# Patient Record
Sex: MALE | Race: WHITE | NOT HISPANIC OR LATINO | Employment: OTHER | URBAN - METROPOLITAN AREA
[De-identification: names, ages, dates, MRNs, and addresses within clinical notes are randomized per-mention and may not be internally consistent; named-entity substitution may affect disease eponyms.]

---

## 2021-05-13 ENCOUNTER — APPOINTMENT (EMERGENCY)
Dept: RADIOLOGY | Facility: HOSPITAL | Age: 66
End: 2021-05-13
Payer: MEDICARE

## 2021-05-13 ENCOUNTER — APPOINTMENT (EMERGENCY)
Dept: CT IMAGING | Facility: HOSPITAL | Age: 66
End: 2021-05-13
Payer: MEDICARE

## 2021-05-13 ENCOUNTER — HOSPITAL ENCOUNTER (OUTPATIENT)
Facility: HOSPITAL | Age: 66
Setting detail: OBSERVATION
Discharge: HOME | End: 2021-05-14
Attending: EMERGENCY MEDICINE | Admitting: INTERNAL MEDICINE
Payer: MEDICARE

## 2021-05-13 DIAGNOSIS — R07.9 CHEST PAIN: ICD-10-CM

## 2021-05-13 DIAGNOSIS — R42 VERTIGO: Primary | ICD-10-CM

## 2021-05-13 PROBLEM — K21.9 GASTROESOPHAGEAL REFLUX DISEASE WITHOUT ESOPHAGITIS: Chronic | Status: ACTIVE | Noted: 2021-05-13

## 2021-05-13 PROBLEM — J45.30 MILD PERSISTENT ASTHMA WITHOUT COMPLICATION: Chronic | Status: ACTIVE | Noted: 2021-05-13

## 2021-05-13 PROBLEM — G25.81 RESTLESS LEGS: Chronic | Status: ACTIVE | Noted: 2021-05-13

## 2021-05-13 PROBLEM — R93.0 ABNORMAL CT SCAN OF HEAD: Status: ACTIVE | Noted: 2021-05-13

## 2021-05-13 PROBLEM — E78.49 OTHER HYPERLIPIDEMIA: Chronic | Status: ACTIVE | Noted: 2021-05-13

## 2021-05-13 LAB
ALBUMIN SERPL BCP-MCNC: 4 G/DL (ref 3.5–5)
ALP SERPL-CCNC: 64 U/L (ref 46–116)
ALT SERPL W P-5'-P-CCNC: 32 U/L (ref 12–78)
ANION GAP SERPL CALCULATED.3IONS-SCNC: 7 MMOL/L (ref 4–13)
AST SERPL W P-5'-P-CCNC: 16 U/L (ref 5–45)
ATRIAL RATE: 65 BPM
BASOPHILS # BLD AUTO: 0.05 THOUSANDS/ΜL (ref 0–0.1)
BASOPHILS NFR BLD AUTO: 1 % (ref 0–1)
BILIRUB DIRECT SERPL-MCNC: 0.08 MG/DL (ref 0–0.2)
BILIRUB SERPL-MCNC: 0.25 MG/DL (ref 0.2–1)
BUN SERPL-MCNC: 15 MG/DL (ref 5–25)
CALCIUM SERPL-MCNC: 9 MG/DL (ref 8.3–10.1)
CHLORIDE SERPL-SCNC: 105 MMOL/L (ref 100–108)
CO2 SERPL-SCNC: 28 MMOL/L (ref 21–32)
CREAT SERPL-MCNC: 1.11 MG/DL (ref 0.6–1.3)
EOSINOPHIL # BLD AUTO: 0.1 THOUSAND/ΜL (ref 0–0.61)
EOSINOPHIL NFR BLD AUTO: 1 % (ref 0–6)
ERYTHROCYTE [DISTWIDTH] IN BLOOD BY AUTOMATED COUNT: 12.1 % (ref 11.6–15.1)
GFR SERPL CREATININE-BSD FRML MDRD: 68 ML/MIN/1.73SQ M
GLUCOSE SERPL-MCNC: 86 MG/DL (ref 65–140)
HCT VFR BLD AUTO: 44.3 % (ref 36.5–49.3)
HGB BLD-MCNC: 14.8 G/DL (ref 12–17)
IMM GRANULOCYTES # BLD AUTO: 0.03 THOUSAND/UL (ref 0–0.2)
IMM GRANULOCYTES NFR BLD AUTO: 0 % (ref 0–2)
LYMPHOCYTES # BLD AUTO: 2.21 THOUSANDS/ΜL (ref 0.6–4.47)
LYMPHOCYTES NFR BLD AUTO: 29 % (ref 14–44)
MAGNESIUM SERPL-MCNC: 2.2 MG/DL (ref 1.6–2.6)
MCH RBC QN AUTO: 31.7 PG (ref 26.8–34.3)
MCHC RBC AUTO-ENTMCNC: 33.4 G/DL (ref 31.4–37.4)
MCV RBC AUTO: 95 FL (ref 82–98)
MONOCYTES # BLD AUTO: 0.77 THOUSAND/ΜL (ref 0.17–1.22)
MONOCYTES NFR BLD AUTO: 10 % (ref 4–12)
NEUTROPHILS # BLD AUTO: 4.42 THOUSANDS/ΜL (ref 1.85–7.62)
NEUTS SEG NFR BLD AUTO: 59 % (ref 43–75)
NRBC BLD AUTO-RTO: 0 /100 WBCS
P AXIS: 52 DEGREES
PLATELET # BLD AUTO: 286 THOUSANDS/UL (ref 149–390)
PMV BLD AUTO: 9.8 FL (ref 8.9–12.7)
POTASSIUM SERPL-SCNC: 4.4 MMOL/L (ref 3.5–5.3)
PR INTERVAL: 170 MS
PROT SERPL-MCNC: 8.3 G/DL (ref 6.4–8.2)
QRS AXIS: -35 DEGREES
QRSD INTERVAL: 104 MS
QT INTERVAL: 394 MS
QTC INTERVAL: 409 MS
RBC # BLD AUTO: 4.67 MILLION/UL (ref 3.88–5.62)
SODIUM SERPL-SCNC: 140 MMOL/L (ref 136–145)
T WAVE AXIS: 36 DEGREES
TROPONIN I SERPL-MCNC: <0.02 NG/ML
TSH SERPL DL<=0.05 MIU/L-ACNC: 2.98 UIU/ML (ref 0.36–3.74)
VENTRICULAR RATE: 65 BPM
WBC # BLD AUTO: 7.58 THOUSAND/UL (ref 4.31–10.16)

## 2021-05-13 PROCEDURE — G1004 CDSM NDSC: HCPCS

## 2021-05-13 PROCEDURE — 85025 COMPLETE CBC W/AUTO DIFF WBC: CPT | Performed by: EMERGENCY MEDICINE

## 2021-05-13 PROCEDURE — 80048 BASIC METABOLIC PNL TOTAL CA: CPT | Performed by: EMERGENCY MEDICINE

## 2021-05-13 PROCEDURE — 84484 ASSAY OF TROPONIN QUANT: CPT | Performed by: EMERGENCY MEDICINE

## 2021-05-13 PROCEDURE — 99285 EMERGENCY DEPT VISIT HI MDM: CPT | Performed by: EMERGENCY MEDICINE

## 2021-05-13 PROCEDURE — 70498 CT ANGIOGRAPHY NECK: CPT

## 2021-05-13 PROCEDURE — 83735 ASSAY OF MAGNESIUM: CPT | Performed by: EMERGENCY MEDICINE

## 2021-05-13 PROCEDURE — 93010 ELECTROCARDIOGRAM REPORT: CPT | Performed by: INTERNAL MEDICINE

## 2021-05-13 PROCEDURE — 1124F ACP DISCUSS-NO DSCNMKR DOCD: CPT | Performed by: INTERNAL MEDICINE

## 2021-05-13 PROCEDURE — 99285 EMERGENCY DEPT VISIT HI MDM: CPT

## 2021-05-13 PROCEDURE — 93005 ELECTROCARDIOGRAM TRACING: CPT

## 2021-05-13 PROCEDURE — 70496 CT ANGIOGRAPHY HEAD: CPT

## 2021-05-13 PROCEDURE — 71046 X-RAY EXAM CHEST 2 VIEWS: CPT

## 2021-05-13 PROCEDURE — 99220 PR INITIAL OBSERVATION CARE/DAY 70 MINUTES: CPT | Performed by: PHYSICIAN ASSISTANT

## 2021-05-13 PROCEDURE — 84443 ASSAY THYROID STIM HORMONE: CPT | Performed by: EMERGENCY MEDICINE

## 2021-05-13 PROCEDURE — 36415 COLL VENOUS BLD VENIPUNCTURE: CPT | Performed by: EMERGENCY MEDICINE

## 2021-05-13 PROCEDURE — 80076 HEPATIC FUNCTION PANEL: CPT | Performed by: EMERGENCY MEDICINE

## 2021-05-13 RX ORDER — BUDESONIDE AND FORMOTEROL FUMARATE DIHYDRATE 160; 4.5 UG/1; UG/1
2 AEROSOL RESPIRATORY (INHALATION) 2 TIMES DAILY
COMMUNITY

## 2021-05-13 RX ORDER — FLUTICASONE FUROATE AND VILANTEROL 100; 25 UG/1; UG/1
1 POWDER RESPIRATORY (INHALATION) DAILY
Status: DISCONTINUED | OUTPATIENT
Start: 2021-05-14 | End: 2021-05-14 | Stop reason: HOSPADM

## 2021-05-13 RX ORDER — ONDANSETRON 2 MG/ML
4 INJECTION INTRAMUSCULAR; INTRAVENOUS EVERY 6 HOURS PRN
Status: DISCONTINUED | OUTPATIENT
Start: 2021-05-13 | End: 2021-05-14 | Stop reason: HOSPADM

## 2021-05-13 RX ORDER — DIPHENHYDRAMINE HCL 25 MG
25 TABLET ORAL EVERY 6 HOURS PRN
Status: DISCONTINUED | OUTPATIENT
Start: 2021-05-13 | End: 2021-05-14 | Stop reason: HOSPADM

## 2021-05-13 RX ORDER — OXYCODONE HYDROCHLORIDE 5 MG/1
5 CAPSULE ORAL 2 TIMES DAILY
COMMUNITY

## 2021-05-13 RX ORDER — PRAMIPEXOLE DIHYDROCHLORIDE 0.25 MG/1
0.25 TABLET ORAL 3 TIMES DAILY
Status: DISCONTINUED | OUTPATIENT
Start: 2021-05-13 | End: 2021-05-13

## 2021-05-13 RX ORDER — DIPHENHYDRAMINE HCL 25 MG
25 CAPSULE ORAL
COMMUNITY

## 2021-05-13 RX ORDER — ALBUTEROL SULFATE 90 UG/1
1 AEROSOL, METERED RESPIRATORY (INHALATION) EVERY 4 HOURS PRN
Status: DISCONTINUED | OUTPATIENT
Start: 2021-05-13 | End: 2021-05-14 | Stop reason: HOSPADM

## 2021-05-13 RX ORDER — MECLIZINE HYDROCHLORIDE 25 MG/1
25 TABLET ORAL 3 TIMES DAILY PRN
Qty: 30 TABLET | Refills: 0 | Status: SHIPPED | OUTPATIENT
Start: 2021-05-13

## 2021-05-13 RX ORDER — POLYETHYLENE GLYCOL 3350 17 G/17G
17 POWDER, FOR SOLUTION ORAL DAILY PRN
Status: DISCONTINUED | OUTPATIENT
Start: 2021-05-13 | End: 2021-05-14 | Stop reason: HOSPADM

## 2021-05-13 RX ORDER — MECLIZINE HCL 12.5 MG/1
25 TABLET ORAL ONCE
Status: COMPLETED | OUTPATIENT
Start: 2021-05-13 | End: 2021-05-13

## 2021-05-13 RX ORDER — PRAMIPEXOLE DIHYDROCHLORIDE 0.12 MG/1
0.25 TABLET ORAL
COMMUNITY
Start: 2021-05-11

## 2021-05-13 RX ORDER — PANTOPRAZOLE SODIUM 40 MG/1
40 TABLET, DELAYED RELEASE ORAL 2 TIMES DAILY
COMMUNITY

## 2021-05-13 RX ORDER — PANTOPRAZOLE SODIUM 40 MG/1
40 TABLET, DELAYED RELEASE ORAL
Status: DISCONTINUED | OUTPATIENT
Start: 2021-05-13 | End: 2021-05-14 | Stop reason: HOSPADM

## 2021-05-13 RX ORDER — OXYCODONE HYDROCHLORIDE 5 MG/1
5 TABLET ORAL 2 TIMES DAILY
Status: DISCONTINUED | OUTPATIENT
Start: 2021-05-13 | End: 2021-05-14 | Stop reason: HOSPADM

## 2021-05-13 RX ORDER — OXYCODONE HYDROCHLORIDE 5 MG/1
5 TABLET ORAL ONCE
Status: COMPLETED | OUTPATIENT
Start: 2021-05-13 | End: 2021-05-13

## 2021-05-13 RX ORDER — ACETAMINOPHEN 325 MG/1
650 TABLET ORAL EVERY 6 HOURS PRN
Status: DISCONTINUED | OUTPATIENT
Start: 2021-05-13 | End: 2021-05-14 | Stop reason: HOSPADM

## 2021-05-13 RX ORDER — PHENOL 1.4 %
1 AEROSOL, SPRAY (ML) MUCOUS MEMBRANE
COMMUNITY

## 2021-05-13 RX ORDER — LANOLIN ALCOHOL/MO/W.PET/CERES
6 CREAM (GRAM) TOPICAL
Status: DISCONTINUED | OUTPATIENT
Start: 2021-05-13 | End: 2021-05-14 | Stop reason: HOSPADM

## 2021-05-13 RX ORDER — PRAMIPEXOLE DIHYDROCHLORIDE 0.25 MG/1
0.25 TABLET ORAL
Status: DISCONTINUED | OUTPATIENT
Start: 2021-05-13 | End: 2021-05-14 | Stop reason: HOSPADM

## 2021-05-13 RX ORDER — CALCIUM CARBONATE 200(500)MG
1000 TABLET,CHEWABLE ORAL DAILY PRN
Status: DISCONTINUED | OUTPATIENT
Start: 2021-05-13 | End: 2021-05-14 | Stop reason: HOSPADM

## 2021-05-13 RX ADMIN — OXYCODONE HYDROCHLORIDE 5 MG: 5 TABLET ORAL at 16:38

## 2021-05-13 RX ADMIN — MECLIZINE 25 MG: 12.5 TABLET ORAL at 16:38

## 2021-05-13 RX ADMIN — ACETAMINOPHEN 650 MG: 325 TABLET, FILM COATED ORAL at 20:42

## 2021-05-13 RX ADMIN — IOHEXOL 85 ML: 350 INJECTION, SOLUTION INTRAVENOUS at 17:21

## 2021-05-13 RX ADMIN — DIPHENHYDRAMINE HCL 25 MG: 25 TABLET ORAL at 22:53

## 2021-05-13 RX ADMIN — PANTOPRAZOLE SODIUM 40 MG: 40 TABLET, DELAYED RELEASE ORAL at 20:27

## 2021-05-13 RX ADMIN — OXYCODONE HYDROCHLORIDE 5 MG: 5 TABLET ORAL at 20:27

## 2021-05-13 RX ADMIN — PRAMIPEXOLE DIHYDROCHLORIDE 0.25 MG: 0.25 TABLET ORAL at 22:53

## 2021-05-13 RX ADMIN — MELATONIN 6 MG: at 22:53

## 2021-05-13 NOTE — ASSESSMENT & PLAN NOTE
· Patient complaining of intermittent vertigo that began yesterday-has been on and off since  · Additionally complaining of intermittent nonexertional chest pain   · These 2 symptoms prompted him to come the emergency department for further evaluation  · Patient's dizziness resolved with a dose of meclizine in the emergency department  · CT/CTA of the head and neck was obtained and showed a small focus of high density identified along the interhemispheric fissure on the noncontrast head CT measuring approximately 5 mm    Differential includes small meningioma, dural calcification and less likely but not excluded a small volume of subdural and subarachnoid hemorrhage  · Recommending a repeat CT scan in 24 hours for further evaluation  · Will place order for CT scan tomorrow  · Emergency department discussed with Neurosurgery and recommended that he be admitted to [facility] at this time  · With no focal neurologic deficits on exam

## 2021-05-13 NOTE — ASSESSMENT & PLAN NOTE
· Complaining of intermittent, twinging chest pain is nonexertional in nature  · Reports that last evening he was having chest pressure while lying down after eating  · States this is been going on over the course of the last few months  · EKG in the emergency department showed normal sinus rhythm with a left axis deviation  · Troponin x1 negative  · TARAS 1  · Chest pain does not sound anginal in nature-patient denies any history of hypertension, CAD or family history

## 2021-05-13 NOTE — DISCHARGE INSTRUCTIONS
Return to the ER if your chest pain changes in quality or location, or becomes associated with shortness of breath, lightheadedness, vomiting or sweating  Take the meclizine 3 times daily for the next 3 days, then as needed for dizziness  The number for an Ear, Nose and Throat doctor is included in these discharge instructions, call to make an appointment for further evaluation  The number for cardiology is included in these discharge instructions, call to be seen in the office for further evaluation and management

## 2021-05-13 NOTE — ED NOTES
Chetan Whitfield RN called patient's pharmacy at this time for list of home medications       Tra Swan RN  05/13/21 0526

## 2021-05-13 NOTE — ED NOTES
ED provider at bedside       [facility] Dejesus [facility]  05/13/21 4787

## 2021-05-13 NOTE — ED PROVIDER NOTES
History  Chief Complaint   Patient presents with    Chest Pain     Pt reports intermittent sharp chest pain accross chest  Pain feels like pressure when laying down  Pt denies worsening SOB  Pt does reports vertigo througout the day yesterday  76 YO male presents for evaluation of vertigo, onset last night  Pt states symptoms started ~20 hours PTA  Pt notes he was in his kitchen when he had a sudden onset of dizziness, feeling off balance  He sat down and states this did improve mildly  Pt went to sleep and states he woke with further improvement in his symptoms  He notes feeling some dysequilibrium, he has been able to ambulate, no focal neurologic deficit, he denies similar in the past  Pt has not taken anything for his discomfort  Pt additionally notes some intermittent, fleeting chest discomfort that has been an ongoing issue  States this will often occur at rest, feels like electricity across the chest that resolves rapidly  Pt has not been evaluated for this in the past  He denies an exertional component to this  He has no associated nausea, shortness of breath, palpitations or lightheadedness  Pt has no known cardiac issues, no HTN or DM  Pt denies SOB/F/C/N/V/D/C, no dysuria, burning on urination or blood in urine         History provided by:  Patient   used: No    Chest Pain  Chest pain location: Anterior chest   Pain radiates to:  Does not radiate  Pain severity:  Mild  Onset quality:  Unable to specify  Timing:  Intermittent  Progression:  Waxing and waning  Chronicity:  Chronic  Relieved by:  Nothing  Worsened by:  Nothing tried  Ineffective treatments:  None tried  Associated symptoms: dizziness    Associated symptoms: no abdominal pain, no fever, no nausea, no shortness of breath, not vomiting and no weakness    Dizziness  Quality:  Room spinning  Severity:  Moderate  Onset quality:  Sudden  Duration:  20 hours  Timing:  Constant  Progression:  Partially resolved  Chronicity: New  Relieved by:  Nothing  Worsened by:  Nothing  Ineffective treatments:  None tried  Associated symptoms: chest pain    Associated symptoms: no nausea, no shortness of breath, no vomiting and no weakness        Prior to Admission Medications   Prescriptions Last Dose Informant Patient Reported? Taking? Albuterol Sulfate (VENTOLIN HFA IN)   Yes Yes   Sig: Inhale   Fluticasone Furoate-Vilanterol (BREO ELLIPTA IN)   Yes Yes   Sig: Inhale   Melatonin 10 MG TABS   Yes Yes   Sig: Take 1 tablet by mouth daily at bedtime   budesonide-formoterol (SYMBICORT) 160-4 5 mcg/act inhaler   Yes Yes   Sig: Inhale 2 puffs 2 (two) times a day Rinse mouth after use  diphenhydrAMINE (BENADRYL) 25 mg capsule   Yes Yes   Sig: Take 25 mg by mouth daily at bedtime as needed for sleep   oxyCODONE (OXY-IR) 5 MG capsule   Yes Yes   Sig: Take 5 mg by mouth 2 (two) times a day   pantoprazole (PROTONIX) 40 mg tablet   Yes Yes   Sig: Take 40 mg by mouth 2 (two) times a day   pramipexole (MIRAPEX) 0 125 mg tablet   Yes Yes   Sig: Take 0 25 mg by mouth daily at bedtime      Facility-Administered Medications: None       Past Medical History:   Diagnosis Date    Asthma     Degenerative disc disease, lumbar        Past Surgical History:   Procedure Laterality Date    HERNIA REPAIR      TOTAL HIP ARTHROPLASTY Bilateral        History reviewed  No pertinent family history  I have reviewed and agree with the history as documented  E-Cigarette/Vaping    E-Cigarette Use Never User      E-Cigarette/Vaping Substances     Social History     Tobacco Use    Smoking status: Former Smoker    Smokeless tobacco: Never Used    Tobacco comment: Quit 20 yrs ago   Substance Use Topics    Alcohol use: Yes     Frequency: 4 or more times a week     Drinks per session: 1 or 2     Binge frequency: Never    Drug use: Never       Review of Systems   Constitutional: Negative for fever  HENT: Negative for dental problem      Eyes: Negative for visual disturbance  Respiratory: Negative for shortness of breath  Cardiovascular: Positive for chest pain  Gastrointestinal: Negative for abdominal pain, nausea and vomiting  Genitourinary: Negative for dysuria and frequency  Musculoskeletal: Negative for neck pain and neck stiffness  Skin: Negative for rash  Neurological: Positive for dizziness  Negative for weakness and light-headedness  Psychiatric/Behavioral: Negative for agitation, behavioral problems and confusion  All other systems reviewed and are negative  Physical Exam  Physical Exam  Vitals signs and nursing note reviewed  Constitutional:       Appearance: He is well-developed  HENT:      Head: Normocephalic and atraumatic  Eyes:      Extraocular Movements: Extraocular movements intact  Pupils: Pupils are equal, round, and reactive to light  Comments: No nystagmus  Neck:      Musculoskeletal: Normal range of motion  Cardiovascular:      Rate and Rhythm: Normal rate and regular rhythm  Pulmonary:      Effort: Pulmonary effort is normal    Abdominal:      General: There is no distension  Musculoskeletal: Normal range of motion  Skin:     Findings: No rash  Neurological:      Mental Status: He is alert and oriented to person, place, and time  Coordination: Coordination is intact  Romberg sign negative  Coordination normal  Finger-Nose-Finger Test and Heel to Marsa Baba Test normal       Gait: Gait is intact     Psychiatric:         Behavior: Behavior normal          Vital Signs  ED Triage Vitals   Temperature Pulse Respirations Blood Pressure SpO2   05/13/21 1552 05/13/21 1552 05/13/21 1552 05/13/21 1552 05/13/21 1552   97 9 °F (36 6 °C) 68 18 (!) 187/89 99 %      Temp Source Heart Rate Source Patient Position - Orthostatic VS BP Location FiO2 (%)   05/13/21 1552 05/13/21 1831 05/13/21 1552 05/13/21 1552 --   Oral Monitor Lying Right arm       Pain Score       05/13/21 1552       7           Vitals:    05/13/21 1831 05/13/21 2013 05/14/21 0027 05/14/21 0700   BP: (!) 178/81 159/74 159/74 162/80   Pulse: 64 98 67 58   Patient Position - Orthostatic VS: Lying Lying Lying Lying         Visual Acuity  Visual Acuity      Most Recent Value   L Pupil Size (mm)  3   R Pupil Size (mm)  3   L Pupil Shape  Round   R Pupil Shape  Round          ED Medications  Medications   meclizine (ANTIVERT) tablet 25 mg (25 mg Oral Given 5/13/21 1638)   oxyCODONE (ROXICODONE) IR tablet 5 mg (5 mg Oral Given 5/13/21 1638)   iohexol (OMNIPAQUE) 350 MG/ML injection (SINGLE-DOSE) 85 mL (85 mL Intravenous Given 5/13/21 1721)   temazepam (RESTORIL) capsule 15 mg (15 mg Oral Given 5/14/21 0054)   metoclopramide (REGLAN) injection 10 mg (10 mg Intravenous Given 5/14/21 0054)       Diagnostic Studies  Results Reviewed     Procedure Component Value Units Date/Time    Rapid drug screen, urine [[id number]]  (Abnormal) Collected: 05/14/21 1002    Lab Status: Final result Specimen: Urine, Clean Catch Updated: 05/14/21 1158     Amph/Meth UR Negative     Barbiturate Ur Negative     Benzodiazepine Urine Negative     Cocaine Urine Negative     Methadone Urine Negative     Opiate Urine Positive     PCP Ur Negative     THC Urine Negative     Oxycodone Urine Positive    Narrative:      Presumptive report  If requested, specimen will be sent to reference lab for confirmation  FOR MEDICAL PURPOSES ONLY  IF CONFIRMATION NEEDED PLEASE CONTACT THE LAB WITHIN 5 DAYS      Drug Screen Cutoff Levels:  AMPHETAMINE/METHAMPHETAMINES  1000 ng/mL  BARBITURATES     200 ng/mL  BENZODIAZEPINES     200 ng/mL  COCAINE      300 ng/mL  METHADONE      300 ng/mL  OPIATES      300 ng/mL  PHENCYCLIDINE     25 ng/mL  THC       50 ng/mL  OXYCODONE      100 ng/mL    Hepatic function panel [[id number]]  (Abnormal) Collected: 05/13/21 1638    Lab Status: Final result Specimen: Blood from Arm, Right Updated: 05/13/21 1711     Total Bilirubin 0 25 mg/dL      Bilirubin, Direct 0 08 mg/dL Alkaline Phosphatase 64 U/L      AST 16 U/L      ALT 32 U/L      Total Protein 8 3 g/dL      Albumin 4 0 g/dL     TSH [[id number]]  (Normal) Collected: 05/13/21 1638    Lab Status: Final result Specimen: Blood from Arm, Right Updated: 05/13/21 1711     TSH 3RD GENERATON 2 985 uIU/mL     Narrative:      Patients undergoing fluorescein dye angiography may retain small amounts of fluorescein in the body for 48-72 hours post procedure  Samples containing fluorescein can produce falsely depressed TSH values  If the patient had this procedure,a specimen should be resubmitted post fluorescein clearance        Magnesium [[id number]]  (Normal) Collected: 05/13/21 1638    Lab Status: Final result Specimen: Blood from Arm, Right Updated: 05/13/21 1711     Magnesium 2 2 mg/dL     Troponin I [[id number]]  (Normal) Collected: 05/13/21 1638    Lab Status: Final result Specimen: Blood from Arm, Right Updated: 05/13/21 1703     Troponin I <0 02 ng/mL     Basic metabolic panel [[id number]] Collected: 05/13/21 1638    Lab Status: Final result Specimen: Blood from Arm, Right Updated: 05/13/21 1700     Sodium 140 mmol/L      Potassium 4 4 mmol/L      Chloride 105 mmol/L      CO2 28 mmol/L      ANION GAP 7 mmol/L      BUN 15 mg/dL      Creatinine 1 11 mg/dL      Glucose 86 mg/dL      Calcium 9 0 mg/dL      eGFR 68 ml/min/1 73sq m     Narrative:      Prabhu guidelines for Chronic Kidney Disease (CKD):     Stage 1 with normal or high GFR (GFR > 90 mL/min/1 73 square meters)    Stage 2 Mild CKD (GFR = 60-89 mL/min/1 73 square meters)    Stage 3A Moderate CKD (GFR = 45-59 mL/min/1 73 square meters)    Stage 3B Moderate CKD (GFR = 30-44 mL/min/1 73 square meters)    Stage 4 Severe CKD (GFR = 15-29 mL/min/1 73 square meters)    Stage 5 End Stage CKD (GFR <15 mL/min/1 73 square meters)  Note: GFR calculation is accurate only with a steady state creatinine    CBC and differential [[id number]] Collected: 05/13/21 1638 Lab Status: Final result Specimen: Blood from Arm, Right Updated: 05/13/21 1643     WBC 7 58 Thousand/uL      RBC 4 67 Million/uL      Hemoglobin 14 8 g/dL      Hematocrit 44 3 %      MCV 95 fL      MCH 31 7 pg      MCHC 33 4 g/dL      RDW 12 1 %      MPV 9 8 fL      Platelets 779 Thousands/uL      nRBC 0 /100 WBCs      Neutrophils Relative 59 %      Immat GRANS % 0 %      Lymphocytes Relative 29 %      Monocytes Relative 10 %      Eosinophils Relative 1 %      Basophils Relative 1 %      Neutrophils Absolute 4 42 Thousands/µL      Immature Grans Absolute 0 03 Thousand/uL      Lymphocytes Absolute 2 21 Thousands/µL      Monocytes Absolute 0 77 Thousand/µL      Eosinophils Absolute 0 10 Thousand/µL      Basophils Absolute 0 05 Thousands/µL                  CT head wo contrast   Final Result by Jerry Ross MD (05/14 1222)      No acute intracranial abnormality  No intracranial hemorrhage  Specifically, the previously noted midline interhemispheric region hyperdensity likely represents volume averaging artifact with branches of the anterior cerebral artery  Mild cerebral chronic microangiopathic changes  Workstation performed: [id number]         CTA head and neck with and without contrast   Final Result by Gus Cabral DO (05/13 6471)      Small focus of high density identified along the interhemispheric fissure series 2 image 32 on the noncontrast head CT measuring approximately 5 mm  Differential considerations include small meningioma, dural calcification, and less likely but not    excluded small volume of subdural or subarachnoid hemorrhage  A repeat CT of the brain is recommended in 24 hours for further evaluation  Small caliber change identified distal right ICA as described above  Recommend consultation with the [facility], a division of [facility] at [phone]          Atherosclerotic calcification of the carotid bifurcation results in no greater than mild stenosis  No focal intracranial stenosis identified  I personally discussed this study with Omer Ibarra on 5/13/2021 at 5:51 PM                      Workstation performed: [id number]         XR chest 2 views   Final Result by Gagandeep Zarate MD (05/13 1807)      No acute cardiopulmonary disease  Workstation performed: [id number]                    Procedures  ECG 12 Lead Documentation Only    Date/Time: 5/13/2021 5:05 PM  Performed by: Stevenson Gilford, MD  Authorized by: Stevenson Gilford, MD     ECG reviewed by me, the ED Provider: yes    Patient location:  ED  Interpretation:     Interpretation: non-specific    Rate:     ECG rate:  65    ECG rate assessment: normal    Rhythm:     Rhythm: sinus rhythm    QRS:     QRS axis:  Left    QRS intervals:  Normal  Conduction:     Conduction: normal    ST segments:     ST segments:  Normal  T waves:     T waves: normal               ED Course       1845 - Discussed case with Dr Sharon Collins, Neurosurgery  Explained pt's symptoms, vertigo 20 hours PTA, improvement this morning, resolution with meclizine, normal neurologic exam, no headache or trauma  Dr Sharon Collins did look over imaging, feels Pt is ok to stay in [facility] for repeat CT  HEART Risk Score      Most Recent Value   Heart Score Risk Calculator   History  0 Filed at: 05/13/2021 1716   ECG  0 Filed at: 05/13/2021 1716   Age  2 Filed at: 05/13/2021 1716   Risk Factors  0 Filed at: 05/13/2021 1716   Troponin  0 Filed at: 05/13/2021 1716   HEART Score  2 Filed at: 05/13/2021 1716                      SBIRT 22yo+      Most Recent Value   SBIRT (25 yo +)   In order to provide better care to our patients, we are screening all of our patients for alcohol and drug use  Would it be okay to ask you these screening questions?   No Filed at: 05/13/2021 1603        TARAS Risk Score      Most Recent Value   Age >= 72  1 Filed at: 05/13/2021 1946   Known CAD (stenosis >= 50%)  0 Filed at: 05/13/2021 1946   Recent (<=24 hrs) Service Angina  0 Filed at: 05/13/2021 1946   ST Deviation >= 0 5 mm  0 Filed at: 05/13/2021 1946   3+ CAD Risk Factors (FHx, HTN, HLP, DM, Smoker)  0 Filed at: 05/13/2021 1946   Aspirin Use Past 7 Days  0 Filed at: 05/13/2021 1946   Elevated Cardiac Markers  0 Filed at: 05/13/2021 1946   TARAS Risk Score (Calculated)  1 Filed at: 05/13/2021 1946                  [facility]  Number of Diagnoses or Management Options  Chest pain: new and requires workup  Vertigo: new and requires workup  Diagnosis management comments: 1  Vertigo - Pt with sudden onset last night, improved  He has normal cerebellar function  Will check CBC for anemia, metabolic panel for electrolyte abnormalities and dehydration, TSH  CT head with CTA to determine arterial vessel patency  2  Chest pain - Pt states this has been an ongoing issue and has not changed  It is not exertional  Will check ECG and troponin  Amount and/or Complexity of Data Reviewed  Clinical lab tests: ordered and reviewed  Tests in the radiology section of CPT®: ordered and reviewed  Discuss the patient with other providers: yes  Independent visualization of images, tracings, or specimens: yes    Patient Progress  Patient progress: improved      Disposition  Final diagnoses:   Vertigo   Chest pain     Time reflects when diagnosis was documented in both MDM as applicable and the Disposition within this note     Time User Action Codes Description Comment    5/13/2021  5:43 PM Dilmaoinette Apley E Add [R42] Vertigo     5/13/2021  6:17 PM Dilma Aplalena E Add [R07 9] Chest pain       ED Disposition     ED Disposition Condition Date/Time Comment    Admit Stable Thu May 13, 2021  6:17 PM Case was discussed with Dr Kumar Pierre and the patient's admission status was agreed to be Admission Status: observation status to the service of Dr Kumar Pierre           Follow-up Information     Follow up With Specialties Details Why Contact Info Additional [id number]  [facility], DO Family Medicine Schedule an appointment as soon as possible for a visit   [facility] [address] Cardiology   [facility] [id number]  [address], [address], [place], [place], [address]    DO Javier Otolaryngology   [address]    [address] [place] [address]             Discharge Medication List as of 5/14/2021  1:17 PM      START taking these medications    Details   meclizine (ANTIVERT) 25 mg tablet Take 1 tablet (25 mg total) by mouth 3 (three) times a day as needed for dizziness, Starting Thu 5/13/2021, Normal         CONTINUE these medications which have NOT CHANGED    Details   Albuterol Sulfate (VENTOLIN HFA IN) Inhale, Historical Med      budesonide-formoterol (SYMBICORT) 160-4 5 mcg/act inhaler Inhale 2 puffs 2 (two) times a day Rinse mouth after use , Historical Med      diphenhydrAMINE (BENADRYL) 25 mg capsule Take 25 mg by mouth daily at bedtime as needed for sleep, Historical Med      Fluticasone Furoate-Vilanterol (BREO ELLIPTA IN) Inhale, Historical Med      Melatonin 10 MG TABS Take 1 tablet by mouth daily at bedtime, Historical Med      oxyCODONE (OXY-IR) 5 MG capsule Take 5 mg by mouth 2 (two) times a day, Historical Med      pantoprazole (PROTONIX) 40 mg tablet Take 40 mg by mouth 2 (two) times a day, Historical Med      pramipexole (MIRAPEX) 0 125 mg tablet Take 0 25 mg by mouth daily at bedtime, Starting Tue 5/11/2021, Historical Med           Outpatient Discharge Orders   Activity as tolerated     Call provider for:  persistent nausea or vomiting     Call provider for:  severe uncontrolled pain     Call provider for:  difficulty breathing, headache or visual disturbances     Call provider for:  persistent dizziness or light-headedness       PDMP Review Value Time User    PDMP Reviewed  Yes 5/13/2021  7:11 PM Josep Cisneros PA-C          ED Provider  Electronically Signed by           Emmanuel Slater MD  05/14/21 5082

## 2021-05-13 NOTE — H&P
[address] [facility]&P Noris Lopez 1955, 77 y o  male MRN: [id number]  Unit/Bed#: RAZIA Encounter: [id number]  Primary Care Provider: Adilson Russell DO   Date and time admitted to hospital: 5/13/2021  4:00 PM    * Abnormal CT scan of head  Assessment & Plan  · Patient complaining of intermittent vertigo that began yesterday-has been on and off since  · Additionally complaining of intermittent nonexertional chest pain   · These 2 symptoms prompted him to come the emergency department for further evaluation  · Patient's dizziness resolved with a dose of meclizine in the emergency department  · CT/CTA of the head and neck was obtained and showed a small focus of high density identified along the interhemispheric fissure on the noncontrast head CT measuring approximately 5 mm  Differential includes small meningioma, dural calcification and less likely but not excluded a small volume of subdural and subarachnoid hemorrhage  · Recommending a repeat CT scan in 24 hours for further evaluation  · Will place order for CT scan tomorrow  · Emergency department discussed with Neurosurgery and recommended that he be admitted to [facility] at this time  · With no focal neurologic deficits on exam    Chest pain  Assessment & Plan  · Complaining of intermittent, twinging chest pain is nonexertional in nature  · Reports that last evening he was having chest pressure while lying down after eating  · States this is been going on over the course of the last few months  · EKG in the emergency department showed normal sinus rhythm with a left axis deviation  · Troponin x1 negative  · TARAS 1  · Chest pain does not sound anginal in nature-patient denies any history of hypertension, CAD or family history    Mild persistent asthma without complication  Assessment & Plan  · Continue Breo q d  · Albuterol p r n      Restless legs  Assessment & Plan  · patient maintained on Mirapex HS    Other hyperlipidemia  Assessment & Plan  · Maintained without medication  · followed by PCP    Gastroesophageal reflux disease without esophagitis  Assessment & Plan  · Continue Protonix 40 mg b i d     VTE Prophylaxis: Enoxaparin (Lovenox)  / sequential compression device   Code Status:  Full code  POLST: POLST form is not discussed and not completed at this time  Discussion with family:  Discussed plan of care patient, answer any questions  Anticipated Length of Stay:  Patient will be admitted on an Observation basis with an anticipated length of stay of  less than 2 midnights  Justification for Hospital Stay:  Further evaluation of abnormal CT head    Total Time for Visit, including Counseling / Coordination of Care: 45 minutes  Greater than 50% of this total time spent on direct patient counseling and coordination of care  Chief Complaint:   Dizziness    History of Present Illness:    Catina Hardy is a 77 y o  male with a past medical history of asthma, GERD, hyperlipidemia who presents with dizziness for approximately 1 day  The patient states that last evening he had an episode of sudden onset dizziness  Patient states he has had this on and off since last evening  Additionally is complaining of chest pain that has been intermittent over the course of the last few months  States it is nonexertional and described as twitching  States that is approximately a 2/10 on onset  He states that last evening he noticed that the pain felt more like a pressure when he laid down following eating  These symptoms prompted him to come to the emergency department for further evaluation  [address] Emergency Department, the patient was without any focal neurologic deficits    However given his symptoms he was evaluated with a CT/CTA of the head which showed a small focus of high density identified along the interhemispheric fissure on the noncontrast head CT measuring approximately 5 mm, that time the differential included a small meningioma, dural calcification, and less likely but not excluded a small volume subdural or subarachnoid hemorrhage  Recommend a repeat CT scan within 24 hours for further evaluation  Emergency department staff discussed this case with Neurosurgery at [facility] who recommended the patient be admitted to [facility] at this time and evaluated with a repeat CT scan  Patient's labs are all within normal limits, EKG showed normal sinus rhythm, troponin x1 was negative  Of note, the patient initially told nursing staff that he received care through [address] [place] [address] and that he did not have a medication list   When I asked him, he stated that he receives care at the [facility] and pulled up the medication list on his computer which she told nursing staff he did not have with him  While I was leaving the room, the patient stated that he was going to the bathroom and subsequently brought his backpack with him  Additionally, requesting additional pain medication for his headache  He currently has a pain contract with his primary care provider, was last evaluated with [facility] in September of 2020  Review of Systems:    Review of Systems   Constitutional: Negative for chills and fever  HENT: Negative for ear pain and sore throat  Eyes: Negative for pain and visual disturbance  Respiratory: Negative for cough, choking, chest tightness, shortness of breath and wheezing  Cardiovascular: Positive for chest pain  Negative for palpitations and leg swelling  Gastrointestinal: Negative for abdominal pain, blood in stool, constipation, diarrhea, nausea and vomiting  Genitourinary: Negative for dysuria and hematuria  Musculoskeletal: Positive for back pain  Negative for arthralgias, gait problem and joint swelling  Skin: Negative for color change and rash  Neurological: Positive for dizziness and headaches   Negative for seizures, syncope, speech difficulty, light-headedness and numbness  Past Medical and Surgical History:     Past Medical History:   Diagnosis Date    Asthma     Degenerative disc disease, lumbar        Past Surgical History:   Procedure Laterality Date    HERNIA REPAIR      TOTAL HIP ARTHROPLASTY Bilateral        Meds/Allergies:    Prior to Admission medications    Medication Sig Start Date End Date Taking? Authorizing Provider   Albuterol Sulfate (VENTOLIN HFA IN) Inhale   Yes Historical Provider, MD   budesonide-formoterol (SYMBICORT) 160-4 5 mcg/act inhaler Inhale 2 puffs 2 (two) times a day Rinse mouth after use  Yes Historical Provider, MD   Fluticasone Furoate-Vilanterol (BREO ELLIPTA IN) Inhale   Yes Historical Provider, MD   oxyCODONE (OXY-IR) 5 MG capsule Take 5 mg by mouth 2 (two) times a day   Yes Historical Provider, MD   pantoprazole (PROTONIX) 40 mg tablet Take 40 mg by mouth 2 (two) times a day   Yes Historical Provider, MD   meclizine (ANTIVERT) 25 mg tablet Take 1 tablet (25 mg total) by mouth 3 (three) times a day as needed for dizziness 5/13/21   Lukas Sheriff MD     I have reviewed home medications with patient personally  Allergies: No Known Allergies    Social History:     Marital Status: Single   Occupation:  Unknown  Patient Pre-hospital Living Situation:  Alone  Patient Pre-hospital Level of Mobility:  Ambulatory  Patient Pre-hospital Diet Restrictions:  None  Substance Use History:   Social History     Substance and Sexual Activity   Alcohol Use Yes    Frequency: 4 or more times a week    Drinks per session: 1 or 2    Binge frequency: Never     Social History     Tobacco Use   Smoking Status Former Smoker   Smokeless Tobacco Never Used   Tobacco Comment    Quit 20 yrs ago     Social History     Substance and Sexual Activity   Drug Use Never       Family History:    History reviewed  No pertinent family history      Physical Exam:     Vitals:   Blood Pressure: (!) 178/81 (05/13/21 1831)  Pulse: 64 (05/13/21 1831)  Temperature: 97 9 °F (36 6 °C) (05/13/21 1552)  Temp Source: Oral (05/13/21 1552)  Respirations: 18 (05/13/21 1831)  Weight - Scale: 89 9 kg (198 lb 3 1 oz) (05/13/21 1552)  SpO2: 99 % (05/13/21 1831)    Physical Exam  Vitals signs and nursing note reviewed  Constitutional:       General: He is not in acute distress  Appearance: Normal appearance  He is well-developed and normal weight  He is not ill-appearing, toxic-appearing or diaphoretic  HENT:      Head: Normocephalic and atraumatic  Eyes:      Conjunctiva/sclera: Conjunctivae normal       Pupils: Pupils are equal, round, and reactive to light  Neck:      Musculoskeletal: Neck supple  Cardiovascular:      Rate and Rhythm: Normal rate and regular rhythm  Pulses: Normal pulses  Heart sounds: Normal heart sounds  No murmur  No friction rub  No gallop  Pulmonary:      Effort: Pulmonary effort is normal  No respiratory distress  Breath sounds: Normal breath sounds  No stridor  No wheezing, rhonchi or rales  Chest:      Chest wall: No tenderness  Abdominal:      General: Abdomen is flat  Bowel sounds are normal  There is no distension  Palpations: Abdomen is soft  Tenderness: There is no abdominal tenderness  There is no guarding or rebound  Musculoskeletal:      Right lower leg: No edema  Left lower leg: No edema  Skin:     General: Skin is warm and dry  Capillary Refill: Capillary refill takes less than 2 seconds  Neurological:      General: No focal deficit present  Mental Status: He is alert and oriented to person, place, and time  Mental status is at baseline  Cranial Nerves: No cranial nerve deficit  Motor: No weakness  Additional Data:     Lab Results: I have personally reviewed pertinent reports     and I have personally reviewed pertinent films in PACS    Results from last 7 days   Lab Units 05/13/21  1638   WBC Thousand/uL 7 58   HEMOGLOBIN g/dL 14 8   HEMATOCRIT % 44 3   PLATELETS Thousands/uL 286   NEUTROS PCT % 59   LYMPHS PCT % 29   MONOS PCT % 10   EOS PCT % 1     Results from last 7 days   Lab Units 05/13/21  1638   SODIUM mmol/L 140   POTASSIUM mmol/L 4 4   CHLORIDE mmol/L 105   CO2 mmol/L 28   BUN mg/dL 15   CREATININE mg/dL 1 11   ANION GAP mmol/L 7   CALCIUM mg/dL 9 0   ALBUMIN g/dL 4 0   TOTAL BILIRUBIN mg/dL 0 25   ALK PHOS U/L 64   ALT U/L 32   AST U/L 16   GLUCOSE RANDOM mg/dL 86                       Imaging: I have personally reviewed pertinent reports  and I have personally reviewed pertinent films in PACS    CTA head and neck with and without contrast   Final Result by Nicole White DO (05/13 1751)      Small focus of high density identified along the interhemispheric fissure series 2 image 32 on the noncontrast head CT measuring approximately 5 mm  Differential considerations include small meningioma, dural calcification, and less likely but not    excluded small volume of subdural or subarachnoid hemorrhage  A repeat CT of the brain is recommended in 24 hours for further evaluation  Small caliber change identified distal right ICA as described above  Recommend [facility] with [facility], a division of [address] [place] [address] Neuroscience at [phone]  Atherosclerotic calcification of the carotid bifurcation results in no greater than mild stenosis  No focal intracranial stenosis identified  I personally discussed this study with Ailyn Branham on 5/13/2021 at 5:51 PM                      Workstation performed: [id number]         XR chest 2 views   Final Result by Myron Rider MD (05/13 1807)      No acute cardiopulmonary disease  Workstation performed: [id number]             EKG, Pathology, and Other Studies Reviewed on Admission:   · EKG: NSR, LAD    Allscripts / Epic Records Reviewed: Yes     ** Please Note: This note has been constructed using a voice recognition system   **

## 2021-05-14 ENCOUNTER — APPOINTMENT (OUTPATIENT)
Dept: CT IMAGING | Facility: HOSPITAL | Age: 66
End: 2021-05-14
Payer: MEDICARE

## 2021-05-14 VITALS
TEMPERATURE: 97.8 F | HEART RATE: 58 BPM | HEIGHT: 72 IN | WEIGHT: 198.19 LBS | OXYGEN SATURATION: 99 % | DIASTOLIC BLOOD PRESSURE: 80 MMHG | SYSTOLIC BLOOD PRESSURE: 162 MMHG | BODY MASS INDEX: 26.84 KG/M2 | RESPIRATION RATE: 18 BRPM

## 2021-05-14 LAB
AMPHETAMINES SERPL QL SCN: NEGATIVE
ANION GAP SERPL CALCULATED.3IONS-SCNC: 8 MMOL/L (ref 4–13)
BARBITURATES UR QL: NEGATIVE
BASOPHILS # BLD AUTO: 0.06 THOUSANDS/ΜL (ref 0–0.1)
BASOPHILS NFR BLD AUTO: 1 % (ref 0–1)
BENZODIAZ UR QL: NEGATIVE
BUN SERPL-MCNC: 15 MG/DL (ref 5–25)
CALCIUM SERPL-MCNC: 8.8 MG/DL (ref 8.3–10.1)
CHLORIDE SERPL-SCNC: 106 MMOL/L (ref 100–108)
CO2 SERPL-SCNC: 28 MMOL/L (ref 21–32)
COCAINE UR QL: NEGATIVE
CREAT SERPL-MCNC: 1.28 MG/DL (ref 0.6–1.3)
EOSINOPHIL # BLD AUTO: 0.23 THOUSAND/ΜL (ref 0–0.61)
EOSINOPHIL NFR BLD AUTO: 3 % (ref 0–6)
ERYTHROCYTE [DISTWIDTH] IN BLOOD BY AUTOMATED COUNT: 12.1 % (ref 11.6–15.1)
GFR SERPL CREATININE-BSD FRML MDRD: 58 ML/MIN/1.73SQ M
GLUCOSE P FAST SERPL-MCNC: 93 MG/DL (ref 65–99)
GLUCOSE SERPL-MCNC: 93 MG/DL (ref 65–140)
HCT VFR BLD AUTO: 41.3 % (ref 36.5–49.3)
HGB BLD-MCNC: 13.9 G/DL (ref 12–17)
IMM GRANULOCYTES # BLD AUTO: 0.02 THOUSAND/UL (ref 0–0.2)
IMM GRANULOCYTES NFR BLD AUTO: 0 % (ref 0–2)
LYMPHOCYTES # BLD AUTO: 2.83 THOUSANDS/ΜL (ref 0.6–4.47)
LYMPHOCYTES NFR BLD AUTO: 41 % (ref 14–44)
MCH RBC QN AUTO: 32 PG (ref 26.8–34.3)
MCHC RBC AUTO-ENTMCNC: 33.7 G/DL (ref 31.4–37.4)
MCV RBC AUTO: 95 FL (ref 82–98)
METHADONE UR QL: NEGATIVE
MONOCYTES # BLD AUTO: 0.75 THOUSAND/ΜL (ref 0.17–1.22)
MONOCYTES NFR BLD AUTO: 11 % (ref 4–12)
NEUTROPHILS # BLD AUTO: 3.08 THOUSANDS/ΜL (ref 1.85–7.62)
NEUTS SEG NFR BLD AUTO: 44 % (ref 43–75)
NRBC BLD AUTO-RTO: 0 /100 WBCS
OPIATES UR QL SCN: POSITIVE
OXYCODONE+OXYMORPHONE UR QL SCN: POSITIVE
PCP UR QL: NEGATIVE
PLATELET # BLD AUTO: 257 THOUSANDS/UL (ref 149–390)
PMV BLD AUTO: 9.9 FL (ref 8.9–12.7)
POTASSIUM SERPL-SCNC: 4.2 MMOL/L (ref 3.5–5.3)
RBC # BLD AUTO: 4.35 MILLION/UL (ref 3.88–5.62)
SODIUM SERPL-SCNC: 142 MMOL/L (ref 136–145)
THC UR QL: NEGATIVE
WBC # BLD AUTO: 6.97 THOUSAND/UL (ref 4.31–10.16)

## 2021-05-14 PROCEDURE — 99217 PR OBSERVATION CARE DISCHARGE MANAGEMENT: CPT | Performed by: PHYSICIAN ASSISTANT

## 2021-05-14 PROCEDURE — 80307 DRUG TEST PRSMV CHEM ANLYZR: CPT | Performed by: PHYSICIAN ASSISTANT

## 2021-05-14 PROCEDURE — 80048 BASIC METABOLIC PNL TOTAL CA: CPT | Performed by: PHYSICIAN ASSISTANT

## 2021-05-14 PROCEDURE — G1004 CDSM NDSC: HCPCS

## 2021-05-14 PROCEDURE — 70450 CT HEAD/BRAIN W/O DYE: CPT

## 2021-05-14 PROCEDURE — 85025 COMPLETE CBC W/AUTO DIFF WBC: CPT | Performed by: PHYSICIAN ASSISTANT

## 2021-05-14 RX ORDER — METOCLOPRAMIDE HYDROCHLORIDE 5 MG/ML
10 INJECTION INTRAMUSCULAR; INTRAVENOUS ONCE
Status: COMPLETED | OUTPATIENT
Start: 2021-05-14 | End: 2021-05-14

## 2021-05-14 RX ORDER — OXYMETAZOLINE HYDROCHLORIDE 0.05 G/100ML
2 SPRAY NASAL EVERY 12 HOURS PRN
Status: DISCONTINUED | OUTPATIENT
Start: 2021-05-14 | End: 2021-05-14 | Stop reason: HOSPADM

## 2021-05-14 RX ORDER — TEMAZEPAM 15 MG/1
15 CAPSULE ORAL ONCE
Status: COMPLETED | OUTPATIENT
Start: 2021-05-14 | End: 2021-05-14

## 2021-05-14 RX ADMIN — OXYMETAZOLINE HYDROCHLORIDE 2 SPRAY: 0.05 SPRAY NASAL at 12:54

## 2021-05-14 RX ADMIN — FLUTICASONE FUROATE AND VILANTEROL TRIFENATATE 1 PUFF: 100; 25 POWDER RESPIRATORY (INHALATION) at 09:56

## 2021-05-14 RX ADMIN — PANTOPRAZOLE SODIUM 40 MG: 40 TABLET, DELAYED RELEASE ORAL at 07:12

## 2021-05-14 RX ADMIN — METOCLOPRAMIDE 10 MG: 5 INJECTION, SOLUTION INTRAMUSCULAR; INTRAVENOUS at 00:54

## 2021-05-14 RX ADMIN — OXYCODONE HYDROCHLORIDE 5 MG: 5 TABLET ORAL at 09:56

## 2021-05-14 RX ADMIN — ENOXAPARIN SODIUM 40 MG: 40 INJECTION SUBCUTANEOUS at 09:56

## 2021-05-14 RX ADMIN — ACETAMINOPHEN 650 MG: 325 TABLET, FILM COATED ORAL at 10:52

## 2021-05-14 RX ADMIN — TEMAZEPAM 15 MG: 15 CAPSULE ORAL at 00:54

## 2021-05-14 NOTE — ASSESSMENT & PLAN NOTE
· Patient complaining of intermittent vertigo that began the day prior to admission  · Additionally complaining of intermittent nonexertional chest pain   · These 2 symptoms prompted him to come the emergency department for further evaluation  · Patient's dizziness resolved with a dose of meclizine in the emergency department- continue PRN at discharge   · CT/CTA of the head and neck was obtained and showed a small focus of high density identified along the interhemispheric fissure on the noncontrast head CT measuring approximately 5 mm    Differential includes small meningioma, dural calcification and less likely but not excluded a small volume of subdural and subarachnoid hemorrhage  · Repeat CT today showing no abnormalities, no intracraniel hemorrhage, previously noted midline interhemispheric region hyperdensity likely represents volume averaging artifact with branches of anterior cerebral artery   · Patient continues deny any further episodes of dizziness   · Plan for discharge today with PCP follow up   · Does not need neurosurgery follow up at this time given normal repeat CT head

## 2021-05-14 NOTE — PLAN OF CARE
Problem: PAIN - ADULT  Goal: Verbalizes/displays adequate comfort level or baseline comfort level  Description: Interventions:  - Encourage patient to monitor pain and request assistance  - Assess pain using appropriate pain scale  - Administer analgesics based on type and severity of pain and evaluate response  - Implement non-pharmacological measures as appropriate and evaluate response  - Consider cultural and social influences on pain and pain management  - Notify physician/advanced practitioner if interventions unsuccessful or patient reports new pain  5/14/2021 1305 by Goldy Villalpando RN  Outcome: Adequate for Discharge  5/14/2021 1201 by Goldy Villalpando RN  Outcome: Progressing     Problem: DISCHARGE PLANNING  Goal: Discharge to home or other facility with appropriate resources  Description: INTERVENTIONS:  - Identify barriers to discharge w/patient and caregiver  - Arrange for needed discharge resources and transportation as appropriate  - Identify discharge learning needs (meds, wound care, etc )  - Arrange for interpretive services to assist at discharge as needed  - Refer to Case Management Department for coordinating discharge planning if the patient needs post-hospital services based on physician/advanced practitioner order or complex needs related to functional status, cognitive ability, or social support system  5/14/2021 1305 by Goldy Villalpando RN  Outcome: Adequate for Discharge  5/14/2021 1201 by Goldy Villalpando RN  Outcome: Progressing     Problem: Knowledge Deficit  Goal: Patient/family/caregiver demonstrates understanding of disease process, treatment plan, medications, and discharge instructions  Description: Complete learning assessment and assess knowledge base    Interventions:  - Provide teaching at level of understanding  - Provide teaching via preferred learning methods  5/14/2021 1305 by Goldy Villalpando RN  Outcome: Adequate for Discharge  5/14/2021 1201 by Papa Enriquez RN  Outcome: Progressing     Problem: CARDIOVASCULAR - ADULT  Goal: Maintains optimal cardiac output and hemodynamic stability  Description: INTERVENTIONS:  - Monitor I/O, vital signs and rhythm  - Monitor for S/S and trends of decreased cardiac output  - Administer and titrate ordered vasoactive medications to optimize hemodynamic stability  - Assess quality of pulses, skin color and temperature  - Assess for signs of decreased coronary artery perfusion  - Instruct patient to report change in severity of symptoms  5/14/2021 1305 by Papa Enriquez RN  Outcome: Adequate for Discharge  5/14/2021 1201 by Papa Enriquez RN  Outcome: Progressing     Problem: RESPIRATORY - ADULT  Goal: Achieves optimal ventilation and oxygenation  Description: INTERVENTIONS:  - Assess for changes in respiratory status  - Assess for changes in mentation and behavior  - Position to facilitate oxygenation and minimize respiratory effort  - Oxygen administered by appropriate delivery if ordered  - Initiate smoking cessation education as indicated  - Encourage broncho-pulmonary hygiene including cough, deep breathe, Incentive Spirometry  - Assess the need for suctioning and aspirate as needed  - Assess and instruct to report SOB or any respiratory difficulty  - Respiratory Therapy support as indicated  5/14/2021 1305 by Papa Enriquez RN  Outcome: Adequate for Discharge  5/14/2021 1201 by Papa Enriquez RN  Outcome: Progressing     Problem: SKIN/TISSUE INTEGRITY - ADULT  Goal: Skin integrity remains intact  Description: INTERVENTIONS  - Identify patients at risk for skin breakdown  - Assess and monitor skin integrity  - Assess and monitor nutrition and hydration status  - Monitor labs (i e  albumin)  - Assess for incontinence   - Turn and reposition patient  - Assist with mobility/ambulation  - Relieve pressure over bony prominences  - Avoid friction and shearing  - Provide appropriate hygiene as needed including keeping skin clean and dry  - Evaluate need for skin moisturizer/barrier cream  - Collaborate with interdisciplinary team (i e  Nutrition, Rehabilitation, etc )   - Patient/family teaching  5/14/2021 1305 by Tiffani Montelongo RN  Outcome: Adequate for Discharge  5/14/2021 1201 by Tiffani Montelongo RN  Outcome: Progressing  Goal: Incision(s), wounds(s) or drain site(s) healing without S/S of infection  Description: INTERVENTIONS  - Assess and document risk factors for skin impairment   - Assess and document dressing, incision, wound bed, drain sites and surrounding tissue  - Consider nutrition services referral as needed  - Oral mucous membranes remain intact  - Provide patient/ family education  5/14/2021 1305 by Tiffani Montelongo RN  Outcome: Adequate for Discharge  5/14/2021 1201 by Tiffani Montelongo RN  Outcome: Progressing

## 2021-05-14 NOTE — PLAN OF CARE
Problem: PAIN - ADULT  Goal: Verbalizes/displays adequate comfort level or baseline comfort level  Description: Interventions:  - Encourage patient to monitor pain and request assistance  - Assess pain using appropriate pain scale  - Administer analgesics based on type and severity of pain and evaluate response  - Implement non-pharmacological measures as appropriate and evaluate response  - Consider cultural and social influences on pain and pain management  - Notify physician/advanced practitioner if interventions unsuccessful or patient reports new pain  Outcome: Progressing     Problem: DISCHARGE PLANNING  Goal: Discharge to home or other facility with appropriate resources  Description: INTERVENTIONS:  - Identify barriers to discharge w/patient and caregiver  - Arrange for needed discharge resources and transportation as appropriate  - Identify discharge learning needs (meds, wound care, etc )  - Arrange for interpretive services to assist at discharge as needed  - Refer to Case Management Department for coordinating discharge planning if the patient needs post-hospital services based on physician/advanced practitioner order or complex needs related to functional status, cognitive ability, or social support system  Outcome: Progressing     Problem: Knowledge Deficit  Goal: Patient/family/caregiver demonstrates understanding of disease process, treatment plan, medications, and discharge instructions  Description: Complete learning assessment and assess knowledge base    Interventions:  - Provide teaching at level of understanding  - Provide teaching via preferred learning methods  Outcome: Progressing     Problem: CARDIOVASCULAR - ADULT  Goal: Maintains optimal cardiac output and hemodynamic stability  Description: INTERVENTIONS:  - Monitor I/O, vital signs and rhythm  - Monitor for S/S and trends of decreased cardiac output  - Administer and titrate ordered vasoactive medications to optimize hemodynamic stability  - Assess quality of pulses, skin color and temperature  - Assess for signs of decreased coronary artery perfusion  - Instruct patient to report change in severity of symptoms  Outcome: Progressing     Problem: RESPIRATORY - ADULT  Goal: Achieves optimal ventilation and oxygenation  Description: INTERVENTIONS:  - Assess for changes in respiratory status  - Assess for changes in mentation and behavior  - Position to facilitate oxygenation and minimize respiratory effort  - Oxygen administered by appropriate delivery if ordered  - Initiate smoking cessation education as indicated  - Encourage broncho-pulmonary hygiene including cough, deep breathe, Incentive Spirometry  - Assess the need for suctioning and aspirate as needed  - Assess and instruct to report SOB or any respiratory difficulty  - Respiratory Therapy support as indicated  Outcome: Progressing     Problem: SKIN/TISSUE INTEGRITY - ADULT  Goal: Skin integrity remains intact  Description: INTERVENTIONS  - Identify patients at risk for skin breakdown  - Assess and monitor skin integrity  - Assess and monitor nutrition and hydration status  - Monitor labs (i e  albumin)  - Assess for incontinence   - Turn and reposition patient  - Assist with mobility/ambulation  - Relieve pressure over bony prominences  - Avoid friction and shearing  - Provide appropriate hygiene as needed including keeping skin clean and dry  - Evaluate need for skin moisturizer/barrier cream  - Collaborate with interdisciplinary team (i e  Nutrition, Rehabilitation, etc )   - Patient/family teaching  Outcome: Progressing  Goal: Incision(s), wounds(s) or drain site(s) healing without S/S of infection  Description: INTERVENTIONS  - Assess and document risk factors for skin impairment   - Assess and document dressing, incision, wound bed, drain sites and surrounding tissue  - Consider nutrition services referral as needed  - Oral mucous membranes remain intact  - Provide patient/ family education  Outcome: Progressing

## 2021-05-14 NOTE — ASSESSMENT & PLAN NOTE
· Complaining of intermittent, twinging chest pain is nonexertional in nature  · Reports that last evening he was having chest pressure while lying down after eating  · States this is been going on over the course of the last few months  · EKG in the emergency department showed normal sinus rhythm with a left axis deviation  · Troponin x1 negative  · TARAS 1  · Chest pain does not sound anginal in nature-patient denies any history of hypertension, CAD or family history  · Denied any chest pain episodes while inpatient   · Continue outpatient follow up with PCP and cardiology for further monitoring

## 2021-05-14 NOTE — NURSING NOTE
Patient discharged but this RN was still completing discharge paperwork  Patient states "just mail me my paperwork, I want to be done with this whole situation"  This RN told patient to wait a few minutes, as paperwork was almost completed  Patient said he is leaving and started walking off unit  Paperwork completed by this RN and charge nurse printed paperwork and met patient by elevators with paperwork  Patient home medications from pharmacy were not returned from pharmacy prior to patient leaving and are labeled and at  for patient pickup  Patient left unit ambulating with steady gait and in no apparent distress at time of discharge

## 2021-05-14 NOTE — DISCHARGE SUMMARY
[facility]  Discharge- Kathie Shorten 1955, 77 y o  male MRN: [id number]  Unit/Bed#: E4 -01 Encounter: [id number]  Primary Care Provider: Bryan Canseco DO   Date and time admitted to hospital: 5/13/2021  4:00 PM    * Abnormal CT scan of head  Assessment & Plan  · Patient complaining of intermittent vertigo that began the day prior to admission  · Additionally complaining of intermittent nonexertional chest pain   · These 2 symptoms prompted him to come the emergency department for further evaluation  · Patient's dizziness resolved with a dose of meclizine in the emergency department- continue PRN at discharge   · CT/CTA of the head and neck was obtained and showed a small focus of high density identified along the interhemispheric fissure on the noncontrast head CT measuring approximately 5 mm  Differential includes small meningioma, dural calcification and less likely but not excluded a small volume of subdural and subarachnoid hemorrhage  · Repeat CT today showing no abnormalities, no intracraniel hemorrhage, previously noted midline interhemispheric region hyperdensity likely represents volume averaging artifact with branches of anterior cerebral artery   · Patient continues deny any further episodes of dizziness   · Plan for discharge today with PCP follow up   · Does not need neurosurgery follow up at this time given normal repeat CT head     Mild persistent asthma without complication  Assessment & Plan  · Continue Breo q d  · Albuterol p r n  Restless legs  Assessment & Plan  · patient maintained on Mirapex HS    Other hyperlipidemia  Assessment & Plan  · Maintained without medication  · followed by PCP    Gastroesophageal reflux disease without esophagitis  Assessment & Plan  · Continue Protonix 40 mg b i d      Chest pain  Assessment & Plan  · Complaining of intermittent, twinging chest pain is nonexertional in nature  · Reports that last evening he was having chest pressure while lying down after eating  · States this is been going on over the course of the last few months  · EKG in the emergency department showed normal sinus rhythm with a left axis deviation  · Troponin x1 negative  · TARAS 1  · Chest pain does not sound anginal in nature-patient denies any history of hypertension, CAD or family history  · Denied any chest pain episodes while inpatient   · Continue outpatient follow up with PCP and cardiology for further monitoring     Discharging Physician / Practitioner: Nakia Motley PA-C  PCP: Con Leung DO  Admission Date:   Admission Orders (From admission, onward)     Ordered        05/13/21 1819  Place in Observation  Once                   Discharge Date: 05/14/21    Resolved Problems  Date Reviewed: 5/14/2021    None          Consultations During Hospital Stay:  · none    Procedures Performed:   Cta Head And Neck With And Without Contrast    Result Date: 5/13/2021  Impression: Small focus of high density identified along the interhemispheric fissure series 2 image 32 on the noncontrast head CT measuring approximately 5 mm  Differential considerations include small meningioma, dural calcification, and less likely but not excluded small volume of subdural or subarachnoid hemorrhage  A repeat CT of the brain is recommended in 24 hours for further evaluation  Small caliber change identified distal right ICA as described above  Recommend consultation with [facility], a division of [address] [place] [address] Neuroscience at [phone]  Atherosclerotic calcification of the carotid bifurcation results in no greater than mild stenosis  No focal intracranial stenosis identified  I personally discussed this study with Arjun Villarreal on 5/13/2021 at 5:51 PM  Workstation performed: [id number]     Xr Chest 2 Views    Result Date: 5/13/2021  Impression: No acute cardiopulmonary disease   Workstation performed: [id number]     Ct Head Wo Contrast    Result Date: 5/14/2021  Impression: No acute intracranial abnormality  No intracranial hemorrhage  Specifically, the previously noted midline interhemispheric region hyperdensity likely represents volume averaging artifact with branches of the anterior cerebral artery  Mild cerebral chronic microangiopathic changes  Workstation performed: [id number]       Significant Findings / Test Results:   · See above    Incidental Findings:   · See above     Test Results Pending at Discharge (will require follow up):   · none     Outpatient Tests Requested:  · Outpatient PCP and cardiology follow up     Complications:  none    Reason for Admission: dizziness    Hospital Course:     Satnam White is a 77 y o  male patient who originally presented to the hospital on 5/13/2021 due to dizziness  Patient initially presented to the ED with complaint of dizziness that had been occurring on and off for 1 day  Completely resolved with meclizine given in ED  Patient did undergo CTA which showed possible abnormality and admission was requested for repeat CT in AM  Patient's repeat CT was without abnormalities  Patient continued to be free of any dizziness while inpatient  Patient did also complain of intermittent chest pain for the past few months  EKG and troponin were negative for signs of ischemia  Patient denied any additional episodes of chest pain while inpatient  Please see above list of diagnoses and related plan for additional information  Condition at Discharge: stable     Discharge Day Visit / Exam:     Subjective:  Patient notes he has not had any episodes of dizziness or chest pain since admission  Notes he is feeling well  Denies any pain  Does note he has been waiting for his Breo inhaler and is starting to feel some SOB  Also notes some nasal congestion     Vitals: Blood Pressure: 162/80 (05/14/21 0700)  Pulse: 58 (05/14/21 0700)  Temperature: 97 8 °F (36 6 °C) (05/14/21 0700)  Temp Source: Temporal (05/14/21 0700)  Respirations: 18 (05/14/21 0700)  Height: 6' (182 9 cm) (05/13/21 2013)  Weight - Scale: 89 9 kg (198 lb 3 1 oz) (05/13/21 1552)  SpO2: 99 % (05/14/21 0700)  Exam:   Physical Exam  Vitals signs reviewed  Constitutional:       General: He is not in acute distress  HENT:      Head: Normocephalic and atraumatic  Eyes:      General: No scleral icterus  Conjunctiva/sclera: Conjunctivae normal    Neck:      Musculoskeletal: Neck supple  Cardiovascular:      Rate and Rhythm: Normal rate and regular rhythm  Heart sounds: No murmur  Pulmonary:      Effort: Pulmonary effort is normal  No respiratory distress  Breath sounds: Normal breath sounds  No wheezing  Abdominal:      General: Bowel sounds are normal  There is no distension  Palpations: Abdomen is soft  Tenderness: There is no abdominal tenderness  Musculoskeletal:      Right lower leg: No edema  Left lower leg: No edema  Skin:     General: Skin is warm and dry  Neurological:      Mental Status: He is alert and oriented to person, place, and time  Psychiatric:         Mood and Affect: Mood normal          Behavior: Behavior normal          Discharge instructions/Information to patient and family:   See after visit summary for information provided to patient and family  Provisions for Follow-Up Care:  See after visit summary for information related to follow-up care and any pertinent home health orders  Disposition:     Home    For Discharges to [facility]:   · Not Applicable to this Patient - Not Applicable to this Patient    Planned Readmission: none     Discharge Statement:  I spent 25 minutes discharging the patient  This time was spent on the day of discharge  I had direct contact with the patient on the day of discharge   Greater than 50% of the total time was spent examining patient, answering all patient questions, arranging and discussing plan of care with patient as well as directly providing post-discharge instructions  Additional time then spent on discharge activities  Discharge Medications:  See after visit summary for reconciled discharge medications provided to patient and family        ** Please Note: This note has been constructed using a voice recognition system **

## 2023-07-06 ENCOUNTER — APPOINTMENT (EMERGENCY)
Dept: RADIOLOGY | Facility: HOSPITAL | Age: 68
DRG: 247 | End: 2023-07-06
Payer: MEDICARE

## 2023-07-06 ENCOUNTER — HOSPITAL ENCOUNTER (OUTPATIENT)
Facility: HOSPITAL | Age: 68
Setting detail: OBSERVATION
DRG: 247 | End: 2023-07-07
Attending: EMERGENCY MEDICINE | Admitting: INTERNAL MEDICINE
Payer: MEDICARE

## 2023-07-06 DIAGNOSIS — I20.9 ANGINA PECTORIS (HCC): ICD-10-CM

## 2023-07-06 DIAGNOSIS — I25.118 CORONARY ARTERY DISEASE OF NATIVE ARTERY OF NATIVE HEART WITH STABLE ANGINA PECTORIS (HCC): ICD-10-CM

## 2023-07-06 DIAGNOSIS — R07.9 CHEST PAIN: Primary | ICD-10-CM

## 2023-07-06 PROBLEM — R50.9 LOW GRADE FEVER: Status: ACTIVE | Noted: 2023-07-06

## 2023-07-06 PROBLEM — H91.90 HOH (HARD OF HEARING): Chronic | Status: ACTIVE | Noted: 2023-07-06

## 2023-07-06 PROBLEM — F11.20 UNCOMPLICATED OPIOID DEPENDENCE (HCC): Chronic | Status: ACTIVE | Noted: 2023-07-06

## 2023-07-06 LAB
2HR DELTA HS TROPONIN: -1 NG/L
ALBUMIN SERPL BCP-MCNC: 4 G/DL (ref 3.5–5)
ALP SERPL-CCNC: 59 U/L (ref 34–104)
ALT SERPL W P-5'-P-CCNC: 15 U/L (ref 7–52)
ANION GAP SERPL CALCULATED.3IONS-SCNC: 6 MMOL/L
AST SERPL W P-5'-P-CCNC: 28 U/L (ref 13–39)
ATRIAL RATE: 62 BPM
ATRIAL RATE: 65 BPM
BASOPHILS # BLD AUTO: 0.03 THOUSANDS/ÂΜL (ref 0–0.1)
BASOPHILS NFR BLD AUTO: 0 % (ref 0–1)
BILIRUB SERPL-MCNC: 0.64 MG/DL (ref 0.2–1)
BNP SERPL-MCNC: 149 PG/ML (ref 0–100)
BUN SERPL-MCNC: 28 MG/DL (ref 5–25)
CALCIUM SERPL-MCNC: 8.5 MG/DL (ref 8.4–10.2)
CARDIAC TROPONIN I PNL SERPL HS: 5 NG/L
CARDIAC TROPONIN I PNL SERPL HS: 6 NG/L
CHLORIDE SERPL-SCNC: 109 MMOL/L (ref 96–108)
CO2 SERPL-SCNC: 24 MMOL/L (ref 21–32)
CREAT SERPL-MCNC: 1.62 MG/DL (ref 0.6–1.3)
EOSINOPHIL # BLD AUTO: 0.29 THOUSAND/ÂΜL (ref 0–0.61)
EOSINOPHIL NFR BLD AUTO: 3 % (ref 0–6)
ERYTHROCYTE [DISTWIDTH] IN BLOOD BY AUTOMATED COUNT: 13.4 % (ref 11.6–15.1)
GFR SERPL CREATININE-BSD FRML MDRD: 42 ML/MIN/1.73SQ M
GLUCOSE SERPL-MCNC: 76 MG/DL (ref 65–140)
HCT VFR BLD AUTO: 32.7 % (ref 36.5–49.3)
HGB BLD-MCNC: 10.4 G/DL (ref 12–17)
IMM GRANULOCYTES # BLD AUTO: 0.05 THOUSAND/UL (ref 0–0.2)
IMM GRANULOCYTES NFR BLD AUTO: 1 % (ref 0–2)
LYMPHOCYTES # BLD AUTO: 2.64 THOUSANDS/ÂΜL (ref 0.6–4.47)
LYMPHOCYTES NFR BLD AUTO: 24 % (ref 14–44)
MCH RBC QN AUTO: 29.2 PG (ref 26.8–34.3)
MCHC RBC AUTO-ENTMCNC: 31.8 G/DL (ref 31.4–37.4)
MCV RBC AUTO: 92 FL (ref 82–98)
MONOCYTES # BLD AUTO: 1.05 THOUSAND/ÂΜL (ref 0.17–1.22)
MONOCYTES NFR BLD AUTO: 10 % (ref 4–12)
NEUTROPHILS # BLD AUTO: 7.02 THOUSANDS/ÂΜL (ref 1.85–7.62)
NEUTS SEG NFR BLD AUTO: 62 % (ref 43–75)
NRBC BLD AUTO-RTO: 0 /100 WBCS
P AXIS: 34 DEGREES
P AXIS: 42 DEGREES
PLATELET # BLD AUTO: 202 THOUSANDS/UL (ref 149–390)
PMV BLD AUTO: 10.3 FL (ref 8.9–12.7)
POTASSIUM SERPL-SCNC: 5.1 MMOL/L (ref 3.5–5.3)
PR INTERVAL: 166 MS
PR INTERVAL: 166 MS
PROT SERPL-MCNC: 7.4 G/DL (ref 6.4–8.4)
QRS AXIS: -23 DEGREES
QRS AXIS: -28 DEGREES
QRSD INTERVAL: 100 MS
QRSD INTERVAL: 104 MS
QT INTERVAL: 402 MS
QT INTERVAL: 406 MS
QTC INTERVAL: 408 MS
QTC INTERVAL: 422 MS
RBC # BLD AUTO: 3.56 MILLION/UL (ref 3.88–5.62)
SODIUM SERPL-SCNC: 139 MMOL/L (ref 135–147)
T WAVE AXIS: 38 DEGREES
T WAVE AXIS: 43 DEGREES
VENTRICULAR RATE: 62 BPM
VENTRICULAR RATE: 65 BPM
WBC # BLD AUTO: 11.08 THOUSAND/UL (ref 4.31–10.16)

## 2023-07-06 PROCEDURE — 85025 COMPLETE CBC W/AUTO DIFF WBC: CPT

## 2023-07-06 PROCEDURE — 84145 PROCALCITONIN (PCT): CPT | Performed by: NURSE PRACTITIONER

## 2023-07-06 PROCEDURE — 83880 ASSAY OF NATRIURETIC PEPTIDE: CPT

## 2023-07-06 PROCEDURE — 99285 EMERGENCY DEPT VISIT HI MDM: CPT

## 2023-07-06 PROCEDURE — 99285 EMERGENCY DEPT VISIT HI MDM: CPT | Performed by: EMERGENCY MEDICINE

## 2023-07-06 PROCEDURE — 84484 ASSAY OF TROPONIN QUANT: CPT

## 2023-07-06 PROCEDURE — 36415 COLL VENOUS BLD VENIPUNCTURE: CPT

## 2023-07-06 PROCEDURE — 80053 COMPREHEN METABOLIC PANEL: CPT

## 2023-07-06 PROCEDURE — 93005 ELECTROCARDIOGRAM TRACING: CPT

## 2023-07-06 PROCEDURE — 99223 1ST HOSP IP/OBS HIGH 75: CPT | Performed by: NURSE PRACTITIONER

## 2023-07-06 PROCEDURE — 93010 ELECTROCARDIOGRAM REPORT: CPT | Performed by: INTERNAL MEDICINE

## 2023-07-06 PROCEDURE — 71045 X-RAY EXAM CHEST 1 VIEW: CPT

## 2023-07-06 RX ORDER — ASPIRIN 81 MG/1
81 TABLET, CHEWABLE ORAL DAILY
COMMUNITY
Start: 2023-06-29

## 2023-07-06 RX ORDER — LINACLOTIDE 72 UG/1
72 CAPSULE, GELATIN COATED ORAL DAILY
COMMUNITY
Start: 2023-06-07

## 2023-07-06 RX ORDER — OXYMETAZOLINE HYDROCHLORIDE 0.05 G/100ML
2 SPRAY NASAL ONCE
Status: COMPLETED | OUTPATIENT
Start: 2023-07-06 | End: 2023-07-06

## 2023-07-06 RX ORDER — SIMETHICONE 80 MG
80 TABLET,CHEWABLE ORAL 4 TIMES DAILY PRN
Status: DISCONTINUED | OUTPATIENT
Start: 2023-07-06 | End: 2023-07-07 | Stop reason: HOSPADM

## 2023-07-06 RX ORDER — ATORVASTATIN CALCIUM 80 MG/1
80 TABLET, FILM COATED ORAL
COMMUNITY

## 2023-07-06 RX ORDER — HEPARIN SODIUM 5000 [USP'U]/ML
5000 INJECTION, SOLUTION INTRAVENOUS; SUBCUTANEOUS EVERY 8 HOURS SCHEDULED
Status: DISCONTINUED | OUTPATIENT
Start: 2023-07-06 | End: 2023-07-07 | Stop reason: HOSPADM

## 2023-07-06 RX ORDER — MAGNESIUM HYDROXIDE/ALUMINUM HYDROXICE/SIMETHICONE 120; 1200; 1200 MG/30ML; MG/30ML; MG/30ML
30 SUSPENSION ORAL EVERY 6 HOURS PRN
Status: DISCONTINUED | OUTPATIENT
Start: 2023-07-06 | End: 2023-07-07 | Stop reason: HOSPADM

## 2023-07-06 RX ORDER — NITROGLYCERIN 0.4 MG/1
0.4 TABLET SUBLINGUAL
Status: DISCONTINUED | OUTPATIENT
Start: 2023-07-06 | End: 2023-07-07 | Stop reason: HOSPADM

## 2023-07-06 RX ORDER — OXYCODONE HYDROCHLORIDE 5 MG/1
5 TABLET ORAL 3 TIMES DAILY
Status: DISCONTINUED | OUTPATIENT
Start: 2023-07-06 | End: 2023-07-07 | Stop reason: HOSPADM

## 2023-07-06 RX ORDER — METOPROLOL SUCCINATE 50 MG/1
50 TABLET, EXTENDED RELEASE ORAL DAILY
COMMUNITY
Start: 2023-03-27

## 2023-07-06 RX ORDER — METOPROLOL SUCCINATE 50 MG/1
50 TABLET, EXTENDED RELEASE ORAL DAILY
Status: DISCONTINUED | OUTPATIENT
Start: 2023-07-07 | End: 2023-07-07 | Stop reason: HOSPADM

## 2023-07-06 RX ORDER — PRAMIPEXOLE DIHYDROCHLORIDE 0.25 MG/1
0.25 TABLET ORAL
Status: DISCONTINUED | OUTPATIENT
Start: 2023-07-07 | End: 2023-07-06

## 2023-07-06 RX ORDER — ISOSORBIDE MONONITRATE 30 MG/1
30 TABLET, EXTENDED RELEASE ORAL DAILY
COMMUNITY
Start: 2023-06-29 | End: 2023-09-27

## 2023-07-06 RX ORDER — HYDROXYZINE HYDROCHLORIDE 25 MG/1
50 TABLET, FILM COATED ORAL 3 TIMES DAILY PRN
Status: DISCONTINUED | OUTPATIENT
Start: 2023-07-06 | End: 2023-07-07 | Stop reason: HOSPADM

## 2023-07-06 RX ORDER — DIAPER,BRIEF,INFANT-TODD,DISP
EACH MISCELLANEOUS 2 TIMES DAILY
Status: DISCONTINUED | OUTPATIENT
Start: 2023-07-06 | End: 2023-07-07 | Stop reason: HOSPADM

## 2023-07-06 RX ORDER — ATORVASTATIN CALCIUM 80 MG/1
80 TABLET, FILM COATED ORAL
Status: DISCONTINUED | OUTPATIENT
Start: 2023-07-06 | End: 2023-07-07 | Stop reason: HOSPADM

## 2023-07-06 RX ORDER — PRAMIPEXOLE DIHYDROCHLORIDE 0.25 MG/1
0.25 TABLET ORAL
Status: DISCONTINUED | OUTPATIENT
Start: 2023-07-07 | End: 2023-07-07 | Stop reason: HOSPADM

## 2023-07-06 RX ORDER — ONDANSETRON 2 MG/ML
4 INJECTION INTRAMUSCULAR; INTRAVENOUS EVERY 6 HOURS PRN
Status: DISCONTINUED | OUTPATIENT
Start: 2023-07-06 | End: 2023-07-07 | Stop reason: HOSPADM

## 2023-07-06 RX ORDER — ACETAMINOPHEN 325 MG/1
975 TABLET ORAL EVERY 6 HOURS PRN
Status: DISCONTINUED | OUTPATIENT
Start: 2023-07-06 | End: 2023-07-07 | Stop reason: HOSPADM

## 2023-07-06 RX ORDER — FLUTICASONE PROPIONATE 50 MCG
2 SPRAY, SUSPENSION (ML) NASAL DAILY PRN
Status: DISCONTINUED | OUTPATIENT
Start: 2023-07-06 | End: 2023-07-07 | Stop reason: HOSPADM

## 2023-07-06 RX ORDER — ISOSORBIDE MONONITRATE 30 MG/1
30 TABLET, EXTENDED RELEASE ORAL DAILY
Status: DISCONTINUED | OUTPATIENT
Start: 2023-07-07 | End: 2023-07-07 | Stop reason: HOSPADM

## 2023-07-06 RX ORDER — PANTOPRAZOLE SODIUM 40 MG/1
40 TABLET, DELAYED RELEASE ORAL
Status: DISCONTINUED | OUTPATIENT
Start: 2023-07-07 | End: 2023-07-07 | Stop reason: HOSPADM

## 2023-07-06 RX ORDER — LUBIPROSTONE 8 UG/1
8 CAPSULE ORAL 2 TIMES DAILY
Status: DISCONTINUED | OUTPATIENT
Start: 2023-07-06 | End: 2023-07-07 | Stop reason: HOSPADM

## 2023-07-06 RX ORDER — ASPIRIN 81 MG/1
81 TABLET, CHEWABLE ORAL DAILY
Status: DISCONTINUED | OUTPATIENT
Start: 2023-07-07 | End: 2023-07-07 | Stop reason: HOSPADM

## 2023-07-06 RX ORDER — FLUTICASONE FUROATE AND VILANTEROL 200; 25 UG/1; UG/1
1 POWDER RESPIRATORY (INHALATION)
Status: DISCONTINUED | OUTPATIENT
Start: 2023-07-07 | End: 2023-07-07 | Stop reason: HOSPADM

## 2023-07-06 RX ORDER — FLUTICASONE PROPIONATE 50 MCG
2 SPRAY, SUSPENSION (ML) NASAL DAILY PRN
COMMUNITY

## 2023-07-06 RX ORDER — LOSARTAN POTASSIUM 50 MG/1
50 TABLET ORAL DAILY
COMMUNITY
Start: 2023-03-31 | End: 2023-07-17

## 2023-07-06 RX ORDER — FLUTICASONE PROPIONATE AND SALMETEROL 250; 50 UG/1; UG/1
1 POWDER RESPIRATORY (INHALATION) 2 TIMES DAILY
COMMUNITY
Start: 2023-06-16

## 2023-07-06 RX ORDER — LOSARTAN POTASSIUM 50 MG/1
50 TABLET ORAL DAILY
Status: DISCONTINUED | OUTPATIENT
Start: 2023-07-07 | End: 2023-07-07 | Stop reason: HOSPADM

## 2023-07-06 RX ORDER — HYDROXYZINE 50 MG/1
50 TABLET, FILM COATED ORAL 3 TIMES DAILY PRN
COMMUNITY

## 2023-07-06 RX ADMIN — HEPARIN SODIUM 5000 UNITS: 5000 INJECTION INTRAVENOUS; SUBCUTANEOUS at 23:51

## 2023-07-06 RX ADMIN — OXYMETAZOLINE HYDROCHLORIDE 2 SPRAY: 0.05 SPRAY NASAL at 21:04

## 2023-07-06 RX ADMIN — ATORVASTATIN CALCIUM 80 MG: 80 TABLET, FILM COATED ORAL at 23:51

## 2023-07-06 RX ADMIN — HYDROXYZINE HYDROCHLORIDE 50 MG: 25 TABLET, FILM COATED ORAL at 23:51

## 2023-07-06 RX ADMIN — OXYCODONE HYDROCHLORIDE 5 MG: 5 TABLET ORAL at 23:51

## 2023-07-06 NOTE — ED PROVIDER NOTES
History  Chief Complaint   Patient presents with   • Chest Pain     [place] [facility] c/o intermittent chest pain and dyspnea on exertion. Pt also reports dizziness, denies headache. Hx of cardiac stent placement. Pt states he only feels the symptoms when walking. 69 y.o M w/ h/o extensive CAD s/p stenting, HLD, HTN, CKD presents to the ED due to ongoing chest tightness/pain with radiation towards both shoulders and dyspnea. All of these symptoms are worse on exertion. Patient had similar symptoms about 2 years ago when he was found to have severe multivessel disease and required catheterization and stenting. He notes that over the past few months he has had progressively worsening symptoms that became unbearable about 1 week ago when he was admitted to [facility] for workup. He says they were considering a repeat catheterization, but performed lexiscan and echo. After which, they decided not to perform the catheterization because his symptoms improved. He has been on IMDUR for the past week but notes that he has not had significant improvement. He denies diaphoresis, n/v, abd pain, lightheadedness, or other complaints at this time. No recent leg swelling. Prior to Admission Medications   Prescriptions Last Dose Informant Patient Reported? Taking?    Albuterol Sulfate (VENTOLIN HFA IN) Not Taking  Yes No   Sig: Inhale   Patient not taking: Reported on 2023   Fluticasone-Salmeterol (Advair Diskus) 250-50 mcg/dose inhaler   Yes Yes   Sig: Inhale 1 puff 2 (two) times a day   aspirin 81 mg chewable tablet 2023  Yes Yes   Sig: Chew 81 mg daily   atorvastatin (LIPITOR) 80 mg tablet   Yes Yes   Sig: Take 80 mg by mouth daily at bedtime   fluticasone (FLONASE) 50 mcg/act nasal spray   Yes No   Si sprays into each nostril daily as needed for allergies   hydrOXYzine HCL (ATARAX) 50 mg tablet   Yes Yes   Sig: Take 50 mg by mouth 3 (three) times a day as needed for itching or anxiety   isosorbide mononitrate (IMDUR) 30 mg 24 hr tablet   Yes No   Sig: Take 30 mg by mouth daily   linaCLOtide (Linzess) 72 MCG CAPS 7/6/2023  Yes Yes   Sig: Take 72 mcg by mouth daily   losartan (COZAAR) 50 mg tablet 7/6/2023  Yes Yes   Sig: Take 50 mg by mouth daily   metoprolol succinate (TOPROL-XL) 50 mg 24 hr tablet   Yes Yes   Sig: Take 50 mg by mouth daily   oxyCODONE (OXY-IR) 5 MG capsule 7/6/2023  Yes Yes   Sig: Take 5 mg by mouth 3 (three) times a day   pantoprazole (PROTONIX) 40 mg tablet   Yes Yes   Sig: Take 40 mg by mouth 2 (two) times a day   pramipexole (MIRAPEX) 0.125 mg tablet   Yes Yes   Sig: Take 0.25 mg by mouth daily at bedtime      Facility-Administered Medications: None       Past Medical History:   Diagnosis Date   • Allergic rhinitis    • Asthma    • CAD (coronary artery disease)     s/p MERRITT to LAD/circ/OM/RCA   • Chronic lower back pain     Oxy 5mg TID   • Current tobacco use     cigars   • Former tobacco use     cigarettes   • GERD (gastroesophageal reflux disease)    • Hard of hearing    • History of myocardial infarction    • Hyperlipidemia    • Hypertension    • Hypertriglyceridemia    • IBS (irritable bowel syndrome)     constipation   • RLS (restless legs syndrome)        Past Surgical History:   Procedure Laterality Date   • CARDIAC CATHETERIZATION      diagnostic & interventional, s/p MERRITT to LAD/circ/OM/RCA   • HERNIA REPAIR     • SKIN LESION EXCISION     • TOTAL HIP ARTHROPLASTY Bilateral        History reviewed. No pertinent family history. I have reviewed and agree with the history as documented.     E-Cigarette/Vaping   • E-Cigarette Use Never User      E-Cigarette/Vaping Substances   • Nicotine No    • THC No    • CBD No    • Flavoring No    • Other No    • Unknown No      Social History     Tobacco Use   • Smoking status: Former     Types: Cigarettes     Passive exposure: Never   • Smokeless tobacco: Never   • Tobacco comments:     Cigarettes --> 1ppd x15 years, quit 30 yrs ago     Cigars --> twice a month   Vaping Use   • Vaping Use: Never used   Substance Use Topics   • Alcohol use: Yes     Comment: social   • Drug use: Never        Review of Systems   All other systems reviewed and are negative. Physical Exam  ED Triage Vitals [07/06/23 1916]   Temperature Pulse Respirations Blood Pressure SpO2   100.1 °F (37.8 °C) 63 16 157/69 99 %      Temp Source Heart Rate Source Patient Position - Orthostatic VS BP Location FiO2 (%)   Oral Monitor Sitting Right arm --      Pain Score       No Pain             Orthostatic Vital Signs  Vitals:    07/07/23 1746 07/07/23 1815 07/07/23 1915 07/07/23 2015   BP: 112/61 121/64 (!) 107/49 (!) 100/44   Pulse: 56 59 60 60   Patient Position - Orthostatic VS: Lying Lying Lying Lying       Physical Exam  Vitals and nursing note reviewed. Constitutional:       General: He is not in acute distress. Appearance: He is well-developed. HENT:      Head: Normocephalic and atraumatic. Eyes:      Conjunctiva/sclera: Conjunctivae normal.   Cardiovascular:      Rate and Rhythm: Normal rate and regular rhythm. Heart sounds: No murmur heard. Pulmonary:      Effort: Pulmonary effort is normal. No respiratory distress. Breath sounds: Normal breath sounds. Abdominal:      Palpations: Abdomen is soft. Tenderness: There is no abdominal tenderness. Musculoskeletal:         General: No swelling. Cervical back: Neck supple. Skin:     General: Skin is warm and dry. Capillary Refill: Capillary refill takes less than 2 seconds. Neurological:      Mental Status: He is alert.    Psychiatric:         Mood and Affect: Mood normal.         ED Medications  Medications   sodium chloride 0.9 % bolus 124.65 mL (124.65 mL Intravenous New Bag 7/7/23 1118)     Followed by   sodium chloride 0.9 % infusion (0 mL/kg/hr × 78.2 kg Intravenous Stopped 7/7/23 2116)   oxymetazoline (AFRIN) 0.05 % nasal spray 2 spray (2 sprays Each Nare Given 7/6/23 2104)   clopidogrel (PLAVIX) tablet 600 mg (600 mg Oral Given 7/7/23 1114)   aspirin chewable tablet 243 mg (243 mg Oral Given 7/7/23 1114)       Diagnostic Studies  Results Reviewed     Procedure Component Value Units Date/Time    HS Troponin I 4hr [[id number]]  (Normal) Collected: 07/07/23 0006    Lab Status: Final result Specimen: Blood from Arm, Right Updated: 07/07/23 0211     hs TnI 4hr 5 ng/L      Delta 4hr hsTnI -1 ng/L     Procalcitonin [[id number]]  (Normal) Collected: 07/1955    Lab Status: Final result Specimen: Blood from Arm, Left Updated: 07/07/23 0029     Procalcitonin <0.05 ng/ml     HS Troponin I 2hr [[id number]]  (Normal) Collected: 07/06/23 2159    Lab Status: Final result Specimen: Blood from Arm, Left Updated: 07/06/23 2238     hs TnI 2hr 5 ng/L      Delta 2hr hsTnI -1 ng/L     Comprehensive metabolic panel [[id number]]  (Abnormal) Collected: 07/1955    Lab Status: Final result Specimen: Blood from Arm, Left Updated: 07/06/23 2048     Sodium 139 mmol/L      Potassium 5.1 mmol/L      Chloride 109 mmol/L      CO2 24 mmol/L      ANION GAP 6 mmol/L      BUN 28 mg/dL      Creatinine 1.62 mg/dL      Glucose 76 mg/dL      Calcium 8.5 mg/dL      AST 28 U/L      ALT 15 U/L      Alkaline Phosphatase 59 U/L      Total Protein 7.4 g/dL      Albumin 4.0 g/dL      Total Bilirubin 0.64 mg/dL      eGFR 42 ml/min/1.73sq m     Narrative:      [place] guidelines for Chronic Kidney Disease (CKD):   •  Stage 1 with normal or high GFR (GFR > 90 mL/min/1.73 square meters)  •  Stage 2 Mild CKD (GFR = 60-89 mL/min/1.73 square meters)  •  Stage 3A Moderate CKD (GFR = 45-59 mL/min/1.73 square meters)  •  Stage 3B Moderate CKD (GFR = 30-44 mL/min/1.73 square meters)  •  Stage 4 Severe CKD (GFR = 15-29 mL/min/1.73 square meters)  •  Stage 5 End Stage CKD (GFR <15 mL/min/1.73 square meters)  Note: GFR calculation is accurate only with a steady state creatinine    B-Type Natriuretic Peptide(BNP) [[id number]] (Abnormal) Collected: 07/1955    Lab Status: Final result Specimen: Blood from Arm, Left Updated: 07/06/23 2045      pg/mL     HS Troponin 0hr (reflex protocol) [[id number]]  (Normal) Collected: 07/1955    Lab Status: Final result Specimen: Blood from Arm, Left Updated: 07/06/23 2040     hs TnI 0hr 6 ng/L     CBC and differential [[id number]]  (Abnormal) Collected: 07/1955    Lab Status: Final result Specimen: Blood from Arm, Left Updated: 07/06/23 2010     WBC 11.08 Thousand/uL      RBC 3.56 Million/uL      Hemoglobin 10.4 g/dL      Hematocrit 32.7 %      MCV 92 fL      MCH 29.2 pg      MCHC 31.8 g/dL      RDW 13.4 %      MPV 10.3 fL      Platelets 163 Thousands/uL      nRBC 0 /100 WBCs      Neutrophils Relative 62 %      Immat GRANS % 1 %      Lymphocytes Relative 24 %      Monocytes Relative 10 %      Eosinophils Relative 3 %      Basophils Relative 0 %      Neutrophils Absolute 7.02 Thousands/µL      Immature Grans Absolute 0.05 Thousand/uL      Lymphocytes Absolute 2.64 Thousands/µL      Monocytes Absolute 1.05 Thousand/µL      Eosinophils Absolute 0.29 Thousand/µL      Basophils Absolute 0.03 Thousands/µL                  XR chest 1 view portable   ED Interpretation by Haider Delacruz MD (07/06 2016)   No acute caridopulmonary disease      Final Result by Artur Saunders MD (07/07 4854)      No acute cardiopulmonary disease.                Workstation performed: [id number]               Procedures  ECG 12 Lead Documentation Only    Date/Time: 7/6/2023 9:03 PM    Performed by: Haider Delacruz MD  Authorized by: Haider Delacruz MD    ECG reviewed by me, the ED Provider: yes    Patient location:  ED  Previous ECG:     Previous ECG:  Compared to current    Similarity:  No change  Interpretation:     Interpretation: abnormal    Rate:     ECG rate assessment: normal    Rhythm:     Rhythm: sinus rhythm    Ectopy:     Ectopy: none    QRS:     QRS axis:  Left    QRS intervals: Normal  Conduction:     Conduction: normal    ST segments:     ST segments:  Normal  T waves:     T waves: normal        Conscious Sedation Assessment    Flowsheet Row Classification Score   ASA Scale Assessment 2-Mild to moderate systemic disease, medically well controlled, with no functional limitation filed at 07/07/2023 1545   Mallampati Classification Class II: soft palate, uvula, fauces visible - No Difficulty filed at 07/07/2023 1545          ED Course  ED Course as of 07/08/23 2105   u Jul 06, 2023 1928 ECHO Result Date: 6/28/2023  IMPRESSION: 1. No definite evidence of infarct or ischemia 2. Normal left ventricular systolic function with ejection fraction of 62%     1929 Lexiscan Stress test 06/28/23  There is normal radiotracer labeling of the left ventricle myocardium except for   a tiny, mild intensity defect in the distal inferoapical wall on the stress   images, favored to represent artifact from adjacent bowel activity   There is no regional wall motion abnormality. There is no evidence of transient ischemic dilatation. The calculated left ventricular ejection fraction is 62%. HEART Risk Score    Flowsheet Row Most Recent Value   Heart Score Risk Calculator    History 2 Filed at: 07/06/2023 2051   ECG 0 Filed at: 07/06/2023 2051   Age 2 Filed at: 07/06/2023 2051   Risk Factors 2 Filed at: 07/06/2023 2051   Troponin 0 Filed at: 07/06/2023 2051   HEART Score 6 Filed at: 07/06/2023 2051                      SBIRT 20yo+    Flowsheet Row Most Recent Value   Initial Alcohol Screen: US AUDIT-C     1. How often do you have a drink containing alcohol? 0 Filed at: 07/06/2023 2000   2. How many drinks containing alcohol do you have on a typical day you are drinking? 0 Filed at: 07/06/2023 2000   3b. FEMALE Any Age, or MALE 65+: How often do you have 4 or more drinks on one occassion?  0 Filed at: 07/06/2023 2000   Audit-C Score 0 Filed at: 07/06/2023 2000   JOSETTE: How many times in the past year have you. .. Used an illegal drug or used a prescription medication for non-medical reasons? Never Filed at: 07/06/2023 2000        TARAS Risk Score    Flowsheet Row Most Recent Value   Age >= 72 1 Filed at: 07/06/2023 2324   Known CAD (stenosis >= 50%) 1 Filed at: 07/06/2023 2324   Recent (<=24 hrs) Service Angina 0 Filed at: 07/06/2023 2324   ST Deviation >= 0.5 mm 0 Filed at: 07/06/2023 2324   3+ CAD Risk Factors (FHx, HTN, HLP, DM, Smoker) 0 Filed at: 07/06/2023 2324   Aspirin Use Past 7 Days 1 Filed at: 07/06/2023 2324   Elevated Cardiac Markers 0 Filed at: 07/06/2023 2324   TARAS Risk Score (Calculated) 3 Filed at: 07/06/2023 2324              Medical Decision Making  55-year-old male presenting to emergency department due to chest pain. Differential is broad and includes ACS, PNA, PTX, among multiple others. History making ACS most concerning. Reviewed workup at [address] and ECHO. Reviewed cardiology note that discussed possible catheterization with decision to forego this given normal testing and resolution of symptoms. Patient has symptoms concerning for ischemic disease although EKG is reassuring at this time. Labs otherwise around baseline. Will admit due to elevated HEART score and concerning history. Amount and/or Complexity of Data Reviewed  Labs: ordered. Radiology: ordered and independent interpretation performed. Risk  OTC drugs. Decision regarding hospitalization.             Disposition  Final diagnoses:   Chest pain     Time reflects when diagnosis was documented in both MDM as applicable and the Disposition within this note     Time User Action Codes Description Comment    7/6/2023  9:48 PM Ernesta Aid Add [R07.9] Chest pain     7/6/2023 11:33 PM Taurus Shows Add [I25.118] Coronary artery disease of native artery of native heart with stable angina pectoris ([address])     7/6/2023 11:33 PM Taurus Shows Add [I20.9] Angina pectoris ([address])     7/7/2023 10:43 AM Josue Morrell Modify [I20.9] Angina pectoris [facility])       ED Disposition     ED Disposition   Admit    Condition   Stable    Date/Time   Thu Jul 6, 2023  9:48 PM    Comment   Case was discussed with LYNDSEY and the patient's admission status was agreed to be Admission Status: observation status to the service of [facility] .            Follow-up Information    None         Discharge Medication List as of 7/7/2023 10:15 PM      CONTINUE these medications which have NOT CHANGED    Details   aspirin 81 mg chewable tablet Chew 81 mg daily, Starting Thu 6/29/2023, Historical Med      atorvastatin (LIPITOR) 80 mg tablet Take 80 mg by mouth daily at bedtime, Historical Med      Fluticasone-Salmeterol (Advair Diskus) 250-50 mcg/dose inhaler Inhale 1 puff 2 (two) times a day, Starting Fri 6/16/2023, Historical Med      hydrOXYzine HCL (ATARAX) 50 mg tablet Take 50 mg by mouth 3 (three) times a day as needed for itching or anxiety, Historical Med      linaCLOtide (Linzess) 72 MCG CAPS Take 72 mcg by mouth daily, Starting Wed 6/7/2023, Historical Med      losartan (COZAAR) 50 mg tablet Take 50 mg by mouth daily, Starting Fri 3/31/2023, Historical Med      metoprolol succinate (TOPROL-XL) 50 mg 24 hr tablet Take 50 mg by mouth daily, Starting Mon 3/27/2023, Historical Med      oxyCODONE (OXY-IR) 5 MG capsule Take 5 mg by mouth 3 (three) times a day, Historical Med      pantoprazole (PROTONIX) 40 mg tablet Take 40 mg by mouth 2 (two) times a day, Historical Med      pramipexole (MIRAPEX) 0.125 mg tablet Take 0.25 mg by mouth daily at bedtime, Starting Tue 5/11/2021, Historical Med      Albuterol Sulfate (VENTOLIN HFA IN) Inhale, Historical Med      fluticasone (FLONASE) 50 mcg/act nasal spray 2 sprays into each nostril daily as needed for allergies, Historical Med      isosorbide mononitrate (IMDUR) 30 mg 24 hr tablet Take 30 mg by mouth daily, Starting Thu 6/29/2023, Until Wed 9/27/2023, Historical Med           No discharge procedures on file. PDMP Review       Value Time User    PDMP Reviewed  Yes 7/8/2023  2:08 AM Josias Busch MD           ED Provider  Attending physically available and evaluated Thereasa Factor. I managed the patient along with the ED Attending.     Electronically Signed by         Terri Fish MD  07/08/23 9949

## 2023-07-06 NOTE — ED ATTENDING ATTESTATION
7/6/2023  IChiki DO, saw and evaluated the patient. I have discussed the patient with the resident/non-physician practitioner and agree with the resident's/non-physician practitioner's findings, Plan of Care, and MDM as documented in the resident's/non-physician practitioner's note, except where noted. All available labs and Radiology studies were reviewed. I was present for key portions of any procedure(s) performed by the resident/non-physician practitioner and I was immediately available to provide assistance. At this point I agree with the current assessment done in the Emergency Department. I have conducted an independent evaluation of this patient a history and physical is as follows:    70 yo M h/o CAD s/p stents, HTN, HLD, CKD; presenting for evaluation of chest discomfort. Reports chest tightness/SOB, worse with exertion. Similar sx in the past, improved with Imdur.      Denies fevers, chills, cough/URI sx, abdominal pain, N/V/D/C, calf pain/swelling    MDM: 70 yo M with CP- EKG to r/o STEMI/dysrhythmia/abn intervals/ischemic changes, troponin to eval for ischemia, CBC to assess for leukocytosis/anemia, CMP to assess for elevated LFTs/ETTA/electrolyte derangements, CXR to r/o PTX/PNA/effusion/CHF, admit    ED Course         Critical Care Time  Procedures

## 2023-07-06 NOTE — Clinical Note
Catheter inserted over guidewire.
Prepped: right groin and right radial. Prepped with: chlorhexidine. The site was clipped. The patient was draped.
Removed intact
The ECG shows a sinus rhythm.  ECG rate  = 60 bpm.
The left coronary artery was injected and visualized. Multiple views of the injected vessel were taken.
The right coronary artery was injected and visualized. Multiple views of the injected vessel were taken.
no back pain, no gout, no musculoskeletal pain, no neck pain, and no weakness.

## 2023-07-07 ENCOUNTER — HOSPITAL ENCOUNTER (INPATIENT)
Facility: HOSPITAL | Age: 68
LOS: 10 days | Discharge: HOME | DRG: 247 | End: 2023-07-17
Attending: INTERNAL MEDICINE | Admitting: INTERNAL MEDICINE
Payer: MEDICARE

## 2023-07-07 VITALS
OXYGEN SATURATION: 97 % | SYSTOLIC BLOOD PRESSURE: 100 MMHG | BODY MASS INDEX: 24.83 KG/M2 | DIASTOLIC BLOOD PRESSURE: 44 MMHG | WEIGHT: 183.3 LBS | HEIGHT: 72 IN | HEART RATE: 60 BPM | TEMPERATURE: 35.6 F | RESPIRATION RATE: 20 BRPM

## 2023-07-07 DIAGNOSIS — I25.10 CAD (CORONARY ARTERY DISEASE): ICD-10-CM

## 2023-07-07 DIAGNOSIS — I25.10 CAD, MULTIPLE VESSEL: Primary | ICD-10-CM

## 2023-07-07 DIAGNOSIS — I25.118 CORONARY ARTERY DISEASE OF NATIVE ARTERY OF NATIVE HEART WITH STABLE ANGINA PECTORIS (HCC): ICD-10-CM

## 2023-07-07 PROBLEM — N18.31 STAGE 3A CHRONIC KIDNEY DISEASE (HCC): Status: ACTIVE | Noted: 2023-07-07

## 2023-07-07 LAB
4HR DELTA HS TROPONIN: -1 NG/L
ANION GAP SERPL CALCULATED.3IONS-SCNC: 7 MMOL/L
BASOPHILS # BLD AUTO: 0.05 THOUSANDS/ÂΜL (ref 0–0.1)
BASOPHILS NFR BLD AUTO: 1 % (ref 0–1)
BUN SERPL-MCNC: 25 MG/DL (ref 5–25)
CALCIUM SERPL-MCNC: 8.5 MG/DL (ref 8.4–10.2)
CARDIAC TROPONIN I PNL SERPL HS: 5 NG/L
CHLORIDE SERPL-SCNC: 106 MMOL/L (ref 96–108)
CO2 SERPL-SCNC: 27 MMOL/L (ref 21–32)
CREAT SERPL-MCNC: 1.38 MG/DL (ref 0.6–1.3)
EOSINOPHIL # BLD AUTO: 0.32 THOUSAND/ÂΜL (ref 0–0.61)
EOSINOPHIL NFR BLD AUTO: 3 % (ref 0–6)
ERYTHROCYTE [DISTWIDTH] IN BLOOD BY AUTOMATED COUNT: 13.6 % (ref 11.6–15.1)
GFR SERPL CREATININE-BSD FRML MDRD: 51 ML/MIN/1.73SQ M
GLUCOSE P FAST SERPL-MCNC: 80 MG/DL (ref 65–99)
GLUCOSE SERPL-MCNC: 80 MG/DL (ref 65–140)
HCT VFR BLD AUTO: 35.2 % (ref 36.5–49.3)
HGB BLD-MCNC: 11.3 G/DL (ref 12–17)
IMM GRANULOCYTES # BLD AUTO: 0.06 THOUSAND/UL (ref 0–0.2)
IMM GRANULOCYTES NFR BLD AUTO: 1 % (ref 0–2)
LYMPHOCYTES # BLD AUTO: 2.83 THOUSANDS/ÂΜL (ref 0.6–4.47)
LYMPHOCYTES NFR BLD AUTO: 26 % (ref 14–44)
MCH RBC QN AUTO: 29.4 PG (ref 26.8–34.3)
MCHC RBC AUTO-ENTMCNC: 32.1 G/DL (ref 31.4–37.4)
MCV RBC AUTO: 92 FL (ref 82–98)
MONOCYTES # BLD AUTO: 0.93 THOUSAND/ÂΜL (ref 0.17–1.22)
MONOCYTES NFR BLD AUTO: 9 % (ref 4–12)
NEUTROPHILS # BLD AUTO: 6.77 THOUSANDS/ÂΜL (ref 1.85–7.62)
NEUTS SEG NFR BLD AUTO: 60 % (ref 43–75)
NRBC BLD AUTO-RTO: 0 /100 WBCS
PLATELET # BLD AUTO: 208 THOUSANDS/UL (ref 149–390)
PMV BLD AUTO: 10.5 FL (ref 8.9–12.7)
POTASSIUM SERPL-SCNC: 3.6 MMOL/L (ref 3.5–5.3)
PROCALCITONIN SERPL-MCNC: <0.05 NG/ML
RBC # BLD AUTO: 3.84 MILLION/UL (ref 3.88–5.62)
SODIUM SERPL-SCNC: 140 MMOL/L (ref 135–147)
WBC # BLD AUTO: 10.96 THOUSAND/UL (ref 4.31–10.16)

## 2023-07-07 PROCEDURE — 99222 1ST HOSP IP/OBS MODERATE 55: CPT | Performed by: INTERNAL MEDICINE

## 2023-07-07 PROCEDURE — 93458 L HRT ARTERY/VENTRICLE ANGIO: CPT | Performed by: INTERNAL MEDICINE

## 2023-07-07 PROCEDURE — 80048 BASIC METABOLIC PNL TOTAL CA: CPT | Performed by: NURSE PRACTITIONER

## 2023-07-07 PROCEDURE — C1769 GUIDE WIRE: HCPCS | Performed by: INTERNAL MEDICINE

## 2023-07-07 PROCEDURE — 4A023N7 MEASUREMENT OF CARDIAC SAMPLING AND PRESSURE, LEFT HEART, PERCUTANEOUS APPROACH: ICD-10-PCS | Performed by: INTERNAL MEDICINE

## 2023-07-07 PROCEDURE — C1894 INTRO/SHEATH, NON-LASER: HCPCS | Performed by: INTERNAL MEDICINE

## 2023-07-07 PROCEDURE — B200YZZ PLAIN RADIOGRAPHY OF SINGLE CORONARY ARTERY USING OTHER CONTRAST: ICD-10-PCS | Performed by: INTERNAL MEDICINE

## 2023-07-07 PROCEDURE — 85025 COMPLETE CBC W/AUTO DIFF WBC: CPT | Performed by: NURSE PRACTITIONER

## 2023-07-07 PROCEDURE — 99152 MOD SED SAME PHYS/QHP 5/>YRS: CPT | Performed by: INTERNAL MEDICINE

## 2023-07-07 PROCEDURE — 99239 HOSP IP/OBS DSCHRG MGMT >30: CPT | Performed by: FAMILY MEDICINE

## 2023-07-07 PROCEDURE — 99153 MOD SED SAME PHYS/QHP EA: CPT | Performed by: INTERNAL MEDICINE

## 2023-07-07 PROCEDURE — 84484 ASSAY OF TROPONIN QUANT: CPT

## 2023-07-07 PROCEDURE — 99205 OFFICE O/P NEW HI 60 MIN: CPT | Performed by: INTERNAL MEDICINE

## 2023-07-07 RX ORDER — TEMAZEPAM 7.5 MG/1
7.5 CAPSULE ORAL
Status: CANCELLED | OUTPATIENT
Start: 2023-07-07

## 2023-07-07 RX ORDER — HYDROXYZINE 50 MG/1
50 TABLET, FILM COATED ORAL 3 TIMES DAILY PRN
Status: DISCONTINUED | OUTPATIENT
Start: 2023-07-07 | End: 2023-07-17 | Stop reason: HOSPADM

## 2023-07-07 RX ORDER — HEPARIN SODIUM 5000 [USP'U]/ML
5000 INJECTION, SOLUTION INTRAVENOUS; SUBCUTANEOUS EVERY 8 HOURS SCHEDULED
Status: CANCELLED | OUTPATIENT
Start: 2023-07-07

## 2023-07-07 RX ORDER — MAGNESIUM HYDROXIDE/ALUMINUM HYDROXICE/SIMETHICONE 120; 1200; 1200 MG/30ML; MG/30ML; MG/30ML
30 SUSPENSION ORAL EVERY 6 HOURS PRN
Status: DISCONTINUED | OUTPATIENT
Start: 2023-07-07 | End: 2023-07-17 | Stop reason: HOSPADM

## 2023-07-07 RX ORDER — FLUTICASONE FUROATE AND VILANTEROL 200; 25 UG/1; UG/1
1 POWDER RESPIRATORY (INHALATION)
Status: DISCONTINUED | OUTPATIENT
Start: 2023-07-08 | End: 2023-07-17 | Stop reason: HOSPADM

## 2023-07-07 RX ORDER — SODIUM CHLORIDE 9 MG/ML
100 INJECTION, SOLUTION INTRAVENOUS CONTINUOUS
Status: DISPENSED | OUTPATIENT
Start: 2023-07-07 | End: 2023-07-08

## 2023-07-07 RX ORDER — CLOPIDOGREL BISULFATE 75 MG/1
600 TABLET ORAL ONCE
Status: COMPLETED | OUTPATIENT
Start: 2023-07-07 | End: 2023-07-07

## 2023-07-07 RX ORDER — LUBIPROSTONE 8 UG/1
8 CAPSULE ORAL 2 TIMES DAILY
Status: CANCELLED | OUTPATIENT
Start: 2023-07-07

## 2023-07-07 RX ORDER — ASPIRIN 81 MG/1
243 TABLET, CHEWABLE ORAL ONCE
Status: COMPLETED | OUTPATIENT
Start: 2023-07-07 | End: 2023-07-07

## 2023-07-07 RX ORDER — LOSARTAN POTASSIUM 50 MG/1
50 TABLET ORAL DAILY
Status: CANCELLED | OUTPATIENT
Start: 2023-07-08

## 2023-07-07 RX ORDER — PANTOPRAZOLE SODIUM 40 MG/1
40 TABLET, DELAYED RELEASE ORAL
Status: DISCONTINUED | OUTPATIENT
Start: 2023-07-08 | End: 2023-07-17 | Stop reason: HOSPADM

## 2023-07-07 RX ORDER — SIMETHICONE 80 MG
80 TABLET,CHEWABLE ORAL 4 TIMES DAILY PRN
Status: CANCELLED | OUTPATIENT
Start: 2023-07-07

## 2023-07-07 RX ORDER — SIMETHICONE 80 MG
80 TABLET,CHEWABLE ORAL 4 TIMES DAILY PRN
Status: DISCONTINUED | OUTPATIENT
Start: 2023-07-07 | End: 2023-07-17 | Stop reason: HOSPADM

## 2023-07-07 RX ORDER — METOPROLOL SUCCINATE 50 MG/1
50 TABLET, EXTENDED RELEASE ORAL DAILY
Status: DISCONTINUED | OUTPATIENT
Start: 2023-07-08 | End: 2023-07-17 | Stop reason: HOSPADM

## 2023-07-07 RX ORDER — EZETIMIBE 10 MG/1
10 TABLET ORAL
Status: DISCONTINUED | OUTPATIENT
Start: 2023-07-08 | End: 2023-07-17 | Stop reason: HOSPADM

## 2023-07-07 RX ORDER — LUBIPROSTONE 8 UG/1
8 CAPSULE ORAL 2 TIMES DAILY
Status: DISCONTINUED | OUTPATIENT
Start: 2023-07-08 | End: 2023-07-17 | Stop reason: HOSPADM

## 2023-07-07 RX ORDER — EZETIMIBE 10 MG/1
10 TABLET ORAL
Status: CANCELLED | OUTPATIENT
Start: 2023-07-07

## 2023-07-07 RX ORDER — ATORVASTATIN CALCIUM 80 MG/1
80 TABLET, FILM COATED ORAL
Status: DISCONTINUED | OUTPATIENT
Start: 2023-07-08 | End: 2023-07-17 | Stop reason: HOSPADM

## 2023-07-07 RX ORDER — SODIUM CHLORIDE 9 MG/ML
1 INJECTION, SOLUTION INTRAVENOUS CONTINUOUS
Status: DISCONTINUED | OUTPATIENT
Start: 2023-07-07 | End: 2023-07-07

## 2023-07-07 RX ORDER — TEMAZEPAM 7.5 MG/1
7.5 CAPSULE ORAL
Status: DISCONTINUED | OUTPATIENT
Start: 2023-07-07 | End: 2023-07-10

## 2023-07-07 RX ORDER — ISOSORBIDE MONONITRATE 30 MG/1
30 TABLET, EXTENDED RELEASE ORAL DAILY
Status: DISCONTINUED | OUTPATIENT
Start: 2023-07-08 | End: 2023-07-15

## 2023-07-07 RX ORDER — ASPIRIN 81 MG/1
81 TABLET, CHEWABLE ORAL DAILY
Status: CANCELLED | OUTPATIENT
Start: 2023-07-08

## 2023-07-07 RX ORDER — ISOSORBIDE MONONITRATE 30 MG/1
30 TABLET, EXTENDED RELEASE ORAL DAILY
Status: CANCELLED | OUTPATIENT
Start: 2023-07-08

## 2023-07-07 RX ORDER — OXYMETAZOLINE HYDROCHLORIDE 0.05 G/100ML
2 SPRAY NASAL EVERY 12 HOURS PRN
Status: CANCELLED | OUTPATIENT
Start: 2023-07-07 | End: 2023-07-10

## 2023-07-07 RX ORDER — TEMAZEPAM 7.5 MG/1
7.5 CAPSULE ORAL
Status: DISCONTINUED | OUTPATIENT
Start: 2023-07-07 | End: 2023-07-07 | Stop reason: HOSPADM

## 2023-07-07 RX ORDER — OXYMETAZOLINE HYDROCHLORIDE 0.05 G/100ML
2 SPRAY NASAL EVERY 12 HOURS PRN
Status: DISPENSED | OUTPATIENT
Start: 2023-07-07 | End: 2023-07-10

## 2023-07-07 RX ORDER — VERAPAMIL HCL 2.5 MG/ML
AMPUL (ML) INTRAVENOUS CODE/TRAUMA/SEDATION MEDICATION
Status: DISCONTINUED | OUTPATIENT
Start: 2023-07-07 | End: 2023-07-07 | Stop reason: HOSPADM

## 2023-07-07 RX ORDER — PANTOPRAZOLE SODIUM 40 MG/1
40 TABLET, DELAYED RELEASE ORAL
Status: CANCELLED | OUTPATIENT
Start: 2023-07-08

## 2023-07-07 RX ORDER — METOPROLOL SUCCINATE 50 MG/1
50 TABLET, EXTENDED RELEASE ORAL DAILY
Status: CANCELLED | OUTPATIENT
Start: 2023-07-08

## 2023-07-07 RX ORDER — SODIUM CHLORIDE 9 MG/ML
100 INJECTION, SOLUTION INTRAVENOUS CONTINUOUS
Status: CANCELLED | OUTPATIENT
Start: 2023-07-07 | End: 2023-07-08

## 2023-07-07 RX ORDER — NITROGLYCERIN 0.4 MG/1
0.4 TABLET SUBLINGUAL
Status: DISCONTINUED | OUTPATIENT
Start: 2023-07-07 | End: 2023-07-17 | Stop reason: HOSPADM

## 2023-07-07 RX ORDER — ACETAMINOPHEN 325 MG/1
975 TABLET ORAL EVERY 6 HOURS PRN
Status: CANCELLED | OUTPATIENT
Start: 2023-07-07

## 2023-07-07 RX ORDER — MIDAZOLAM HYDROCHLORIDE 2 MG/2ML
INJECTION, SOLUTION INTRAMUSCULAR; INTRAVENOUS CODE/TRAUMA/SEDATION MEDICATION
Status: DISCONTINUED | OUTPATIENT
Start: 2023-07-07 | End: 2023-07-07 | Stop reason: HOSPADM

## 2023-07-07 RX ORDER — PRAMIPEXOLE DIHYDROCHLORIDE 0.25 MG/1
0.25 TABLET ORAL
Status: DISCONTINUED | OUTPATIENT
Start: 2023-07-08 | End: 2023-07-17 | Stop reason: HOSPADM

## 2023-07-07 RX ORDER — EZETIMIBE 10 MG/1
10 TABLET ORAL
Status: DISCONTINUED | OUTPATIENT
Start: 2023-07-07 | End: 2023-07-07 | Stop reason: HOSPADM

## 2023-07-07 RX ORDER — FLUTICASONE PROPIONATE 50 MCG
2 SPRAY, SUSPENSION (ML) NASAL DAILY PRN
Status: DISCONTINUED | OUTPATIENT
Start: 2023-07-07 | End: 2023-07-17 | Stop reason: HOSPADM

## 2023-07-07 RX ORDER — SODIUM CHLORIDE 9 MG/ML
1 INJECTION, SOLUTION INTRAVENOUS CONTINUOUS
Status: DISPENSED | OUTPATIENT
Start: 2023-07-07 | End: 2023-07-07

## 2023-07-07 RX ORDER — NITROGLYCERIN 20 MG/100ML
INJECTION INTRAVENOUS CODE/TRAUMA/SEDATION MEDICATION
Status: DISCONTINUED | OUTPATIENT
Start: 2023-07-07 | End: 2023-07-07 | Stop reason: HOSPADM

## 2023-07-07 RX ORDER — ASPIRIN 81 MG/1
81 TABLET, CHEWABLE ORAL DAILY
Status: DISCONTINUED | OUTPATIENT
Start: 2023-07-08 | End: 2023-07-17 | Stop reason: HOSPADM

## 2023-07-07 RX ORDER — FLUTICASONE PROPIONATE 50 MCG
2 SPRAY, SUSPENSION (ML) NASAL DAILY PRN
Status: CANCELLED | OUTPATIENT
Start: 2023-07-07

## 2023-07-07 RX ORDER — ACETAMINOPHEN 325 MG/1
975 TABLET ORAL EVERY 6 HOURS PRN
Status: DISCONTINUED | OUTPATIENT
Start: 2023-07-07 | End: 2023-07-17 | Stop reason: HOSPADM

## 2023-07-07 RX ORDER — DIAPER,BRIEF,INFANT-TODD,DISP
EACH MISCELLANEOUS 2 TIMES DAILY
Status: DISCONTINUED | OUTPATIENT
Start: 2023-07-08 | End: 2023-07-17 | Stop reason: HOSPADM

## 2023-07-07 RX ORDER — LIDOCAINE HYDROCHLORIDE 10 MG/ML
INJECTION, SOLUTION EPIDURAL; INFILTRATION; INTRACAUDAL; PERINEURAL CODE/TRAUMA/SEDATION MEDICATION
Status: DISCONTINUED | OUTPATIENT
Start: 2023-07-07 | End: 2023-07-07 | Stop reason: HOSPADM

## 2023-07-07 RX ORDER — MAGNESIUM HYDROXIDE/ALUMINUM HYDROXICE/SIMETHICONE 120; 1200; 1200 MG/30ML; MG/30ML; MG/30ML
30 SUSPENSION ORAL EVERY 6 HOURS PRN
Status: CANCELLED | OUTPATIENT
Start: 2023-07-07

## 2023-07-07 RX ORDER — NITROGLYCERIN 0.4 MG/1
0.4 TABLET SUBLINGUAL
Status: CANCELLED | OUTPATIENT
Start: 2023-07-07

## 2023-07-07 RX ORDER — ATORVASTATIN CALCIUM 80 MG/1
80 TABLET, FILM COATED ORAL
Status: CANCELLED | OUTPATIENT
Start: 2023-07-07

## 2023-07-07 RX ORDER — SODIUM CHLORIDE 9 MG/ML
100 INJECTION, SOLUTION INTRAVENOUS CONTINUOUS
Status: DISCONTINUED | OUTPATIENT
Start: 2023-07-07 | End: 2023-07-07 | Stop reason: HOSPADM

## 2023-07-07 RX ORDER — HYDROXYZINE HYDROCHLORIDE 25 MG/1
50 TABLET, FILM COATED ORAL 3 TIMES DAILY PRN
Status: CANCELLED | OUTPATIENT
Start: 2023-07-07

## 2023-07-07 RX ORDER — PRAMIPEXOLE DIHYDROCHLORIDE 0.25 MG/1
0.25 TABLET ORAL
Status: CANCELLED | OUTPATIENT
Start: 2023-07-07

## 2023-07-07 RX ORDER — ONDANSETRON 2 MG/ML
4 INJECTION INTRAMUSCULAR; INTRAVENOUS EVERY 6 HOURS PRN
Status: DISCONTINUED | OUTPATIENT
Start: 2023-07-07 | End: 2023-07-17 | Stop reason: HOSPADM

## 2023-07-07 RX ORDER — HEPARIN SODIUM 5000 [USP'U]/ML
5000 INJECTION, SOLUTION INTRAVENOUS; SUBCUTANEOUS EVERY 8 HOURS SCHEDULED
Status: DISCONTINUED | OUTPATIENT
Start: 2023-07-08 | End: 2023-07-17 | Stop reason: HOSPADM

## 2023-07-07 RX ORDER — ONDANSETRON 2 MG/ML
4 INJECTION INTRAMUSCULAR; INTRAVENOUS EVERY 6 HOURS PRN
Status: CANCELLED | OUTPATIENT
Start: 2023-07-07

## 2023-07-07 RX ORDER — DIAPER,BRIEF,INFANT-TODD,DISP
EACH MISCELLANEOUS 2 TIMES DAILY
Status: CANCELLED | OUTPATIENT
Start: 2023-07-07

## 2023-07-07 RX ORDER — OXYCODONE HYDROCHLORIDE 5 MG/1
5 TABLET ORAL 3 TIMES DAILY
Status: DISCONTINUED | OUTPATIENT
Start: 2023-07-08 | End: 2023-07-17 | Stop reason: HOSPADM

## 2023-07-07 RX ORDER — OXYMETAZOLINE HYDROCHLORIDE 0.05 G/100ML
2 SPRAY NASAL EVERY 12 HOURS PRN
Status: DISCONTINUED | OUTPATIENT
Start: 2023-07-07 | End: 2023-07-07 | Stop reason: HOSPADM

## 2023-07-07 RX ORDER — LOSARTAN POTASSIUM 50 MG/1
50 TABLET ORAL DAILY
Status: DISCONTINUED | OUTPATIENT
Start: 2023-07-08 | End: 2023-07-07

## 2023-07-07 RX ORDER — HEPARIN SODIUM 1000 [USP'U]/ML
INJECTION, SOLUTION INTRAVENOUS; SUBCUTANEOUS CODE/TRAUMA/SEDATION MEDICATION
Status: DISCONTINUED | OUTPATIENT
Start: 2023-07-07 | End: 2023-07-07 | Stop reason: HOSPADM

## 2023-07-07 RX ORDER — FLUTICASONE FUROATE AND VILANTEROL 200; 25 UG/1; UG/1
1 POWDER RESPIRATORY (INHALATION)
Status: CANCELLED | OUTPATIENT
Start: 2023-07-08

## 2023-07-07 RX ORDER — FENTANYL CITRATE 50 UG/ML
INJECTION, SOLUTION INTRAMUSCULAR; INTRAVENOUS CODE/TRAUMA/SEDATION MEDICATION
Status: DISCONTINUED | OUTPATIENT
Start: 2023-07-07 | End: 2023-07-07 | Stop reason: HOSPADM

## 2023-07-07 RX ORDER — OXYCODONE HYDROCHLORIDE 5 MG/1
5 TABLET ORAL 3 TIMES DAILY
Status: CANCELLED | OUTPATIENT
Start: 2023-07-07

## 2023-07-07 RX ADMIN — SODIUM CHLORIDE 124.65 ML: 0.9 INJECTION, SOLUTION INTRAVENOUS at 11:18

## 2023-07-07 RX ADMIN — EZETIMIBE 10 MG: 10 TABLET ORAL at 21:16

## 2023-07-07 RX ADMIN — FLUTICASONE FUROATE AND VILANTEROL TRIFENATATE 1 PUFF: 200; 25 POWDER RESPIRATORY (INHALATION) at 07:50

## 2023-07-07 RX ADMIN — TEMAZEPAM 7.5 MG: 7.5 CAPSULE ORAL at 00:51

## 2023-07-07 RX ADMIN — PRAMIPEXOLE DIHYDROCHLORIDE 0.25 MG: 0.25 TABLET ORAL at 00:52

## 2023-07-07 RX ADMIN — SODIUM CHLORIDE 100 ML/HR: 0.9 INJECTION, SOLUTION INTRAVENOUS at 21:17

## 2023-07-07 RX ADMIN — ISOSORBIDE MONONITRATE 30 MG: 30 TABLET, EXTENDED RELEASE ORAL at 08:48

## 2023-07-07 RX ADMIN — OXYMETAZOLINE HYDROCHLORIDE 2 SPRAY: 0.05 SPRAY NASAL at 21:15

## 2023-07-07 RX ADMIN — HEPARIN SODIUM 5000 UNITS: 5000 INJECTION INTRAVENOUS; SUBCUTANEOUS at 14:34

## 2023-07-07 RX ADMIN — HYDROCORTISONE: 1 CREAM TOPICAL at 17:49

## 2023-07-07 RX ADMIN — HYDROCORTISONE: 1 CREAM TOPICAL at 00:52

## 2023-07-07 RX ADMIN — PANTOPRAZOLE SODIUM 40 MG: 40 TABLET, DELAYED RELEASE ORAL at 08:48

## 2023-07-07 RX ADMIN — METOPROLOL SUCCINATE 50 MG: 50 TABLET, EXTENDED RELEASE ORAL at 08:48

## 2023-07-07 RX ADMIN — ASPIRIN 81 MG 81 MG: 81 TABLET ORAL at 08:47

## 2023-07-07 RX ADMIN — LUBIPROSTONE 8 MCG: 8 CAPSULE, GELATIN COATED ORAL at 00:51

## 2023-07-07 RX ADMIN — PRAMIPEXOLE DIHYDROCHLORIDE 0.25 MG: 0.25 TABLET ORAL at 17:49

## 2023-07-07 RX ADMIN — FLUTICASONE PROPIONATE 2 SPRAY: 50 SPRAY, METERED NASAL at 00:52

## 2023-07-07 RX ADMIN — CLOPIDOGREL BISULFATE 600 MG: 75 TABLET ORAL at 11:14

## 2023-07-07 RX ADMIN — LUBIPROSTONE 8 MCG: 8 CAPSULE, GELATIN COATED ORAL at 08:47

## 2023-07-07 RX ADMIN — HYDROCORTISONE: 1 CREAM TOPICAL at 08:46

## 2023-07-07 RX ADMIN — PANTOPRAZOLE SODIUM 40 MG: 40 TABLET, DELAYED RELEASE ORAL at 17:49

## 2023-07-07 RX ADMIN — OXYCODONE HYDROCHLORIDE 5 MG: 5 TABLET ORAL at 08:48

## 2023-07-07 RX ADMIN — OXYMETAZOLINE HYDROCHLORIDE 2 SPRAY: 0.05 SPRAY NASAL at 14:34

## 2023-07-07 RX ADMIN — SODIUM CHLORIDE 100 ML/HR: 0.9 INJECTION, SOLUTION INTRAVENOUS at 22:51

## 2023-07-07 RX ADMIN — LOSARTAN POTASSIUM 50 MG: 50 TABLET, FILM COATED ORAL at 08:48

## 2023-07-07 RX ADMIN — SODIUM CHLORIDE 100 ML/HR: 0.9 INJECTION, SOLUTION INTRAVENOUS at 16:44

## 2023-07-07 RX ADMIN — LUBIPROSTONE 8 MCG: 8 CAPSULE, GELATIN COATED ORAL at 21:15

## 2023-07-07 RX ADMIN — ATORVASTATIN CALCIUM 80 MG: 80 TABLET, FILM COATED ORAL at 21:16

## 2023-07-07 RX ADMIN — HEPARIN SODIUM 5000 UNITS: 5000 INJECTION INTRAVENOUS; SUBCUTANEOUS at 21:16

## 2023-07-07 RX ADMIN — OXYCODONE HYDROCHLORIDE 5 MG: 5 TABLET ORAL at 17:49

## 2023-07-07 RX ADMIN — SODIUM CHLORIDE 1 ML/KG/HR: 0.9 INJECTION, SOLUTION INTRAVENOUS at 13:26

## 2023-07-07 RX ADMIN — TEMAZEPAM 7.5 MG: 7.5 CAPSULE ORAL at 23:02

## 2023-07-07 RX ADMIN — OXYCODONE HYDROCHLORIDE 5 MG: 5 TABLET ORAL at 21:16

## 2023-07-07 RX ADMIN — ASPIRIN 81 MG 243 MG: 81 TABLET ORAL at 11:14

## 2023-07-07 NOTE — CONSULTS
Consult - Cardiology   Rowena Lefort 69 y.o. male MRN: [id number]  Unit/Bed#: E4 -01 Encounter: [id number]        Reason For Consult: Chest discomfort               Assessment:  Chest discomfort (chief complaint, reason for consultation) symptoms concerning for angina -see discussion below  CAD   -Cath December 2021 (inferior STEMI): MERRITT -dRCA [severe residual disease proximal-mid LAD]   -Cath 1/3/2022 (hospitalized w/ angina without MI): MERRITT-LAD   -Cath June 2022 (elective test for Sx angina): PCI/MERRITT-LCfx,  Plain balloon angioplasty OM1 (20% visual stenosis),  Patent RCA & LAD stents  Partial shellfish allergy: Cannot handle shellfish, but can eat it. Prior catheterization without steroids  Hypertension: Controlled   -O/p Rx: Cozaar 50 mg daily, Toprol 50 mg daily  Hypertriglyceridemia   -Lipids 3/11/2023: Cholesterol 114, triglycerides 243, direct HDL 27, calculated LDL 38    CKD: Baseline creatinine 1.3-1.5; presently 1.38  GERD, IBS  Chronic low back pain       Discussion / Plan:  # Patient with history of CAD and prior PCI recently developing a pattern of angina that is ongoing despite escalated medical therapy           · Case discussed and catheterization recommended ~~> hopeful for completion later today  · Loaded with Plavix  · With recent lipid profile showing persistent hypertriglyceridemia despite 80 mg of atorvastatin we will add ezetimibe      History Of Present Illness:  Rowena Lefort is a 75-year-old with medical problems highlighted in assessment above noting history of CAD having in December 2021 presented to [facility] with evidence of inferior ST elevation MI. Patient underwent catheterization with drug-eluting stent of the distal RCA. Angiography showed residual disease of the proximal to mid LAD. It was decided the patient after a period of recovery will return for elective intervention of the LAD which was later performed on January 3, 2022.   Ginger that same year because of symptoms of angina and he again underwent catheterization intervention on the circumflex and OM1. With a history of typically good effort tolerance noting the patient can comfortably walk more than a mile he had about 4 weeks ago started to develop some chest discomfort. He described this as a central pressure radiating to his bilateral upper extremities. Symptoms had worsened noting increased frequency and occurring at lower levels of activity. Because of this he recently went to the emergency department at [facility] summarized as follows    6/27-28, 2023 -[facility] with complaints of anginal chest pain    * ECG nonischemic with normal troponins x3    * Lexiscan stress test reportedly without ischemic change and overall normal.  Unremarkable echocardiogram    * Seen by cardiology - Initiated on isosorbide mononitrate 30 mg daily     * Continuation of aspirin, Plavix 75 mg daily, 80 mg atorvastatin, losartan 50 mg daily, Toprol 50 mg daily     * planned 1 week follow-up with PCP, Inna Escamilla    Yesterday, 7/6/2023, the patient was to the office of his PCP for reassessment. There he reiterated his complaints of chest discomfort noting that while isosorbide has offered some benefit in the intensity of his pain he is still experiencing a consistent pattern of effort related central chest pressure relieved with rest with overall decrease in his functional abilities compared to 1 month prior. He has had no pain at rest.  For these he was instructed to come to the emergency department in our network. High-sensitivity troponin levels are normal x3.           Past Medical History:        Past Medical History:   Diagnosis Date   • Asthma    • Degenerative disc disease, lumbar       Past Surgical History:   Procedure Laterality Date   • HERNIA REPAIR     • TOTAL HIP ARTHROPLASTY Bilateral         Allergy:        Allergies   Allergen Reactions   • Shellfish Allergy - Food Allergy Hives     Per patient, states "I can't clean raw shellfish, but I can eat SHRIMP"    • Shellfish-Derived Products - Food Allergy Hives     Per patient, states "I can't clean raw shellfish, but I can eat SHRIMP"    • Penicillins Hives   • Pollen Extract Hives     Per patient, burning itchy eyes, stuffy nose, asthma symptoms. Medications:       Prior to Admission medications    Medication Sig Start Date End Date Taking?  Authorizing Provider   aspirin 81 mg chewable tablet Chew 81 mg daily 6/29/23  Yes Historical Provider, MD   atorvastatin (LIPITOR) 80 mg tablet Take 80 mg by mouth daily at bedtime   Yes Historical Provider, MD   Fluticasone-Salmeterol (Advair Diskus) 250-50 mcg/dose inhaler Inhale 1 puff 2 (two) times a day 6/16/23  Yes Historical Provider, MD   hydrOXYzine HCL (ATARAX) 50 mg tablet Take 50 mg by mouth 3 (three) times a day as needed for itching or anxiety   Yes Historical Provider, MD   linaCLOtide (Linzess) 72 MCG CAPS Take 72 mcg by mouth daily 6/7/23  Yes Historical Provider, MD   losartan (COZAAR) 50 mg tablet Take 50 mg by mouth daily 3/31/23  Yes Historical Provider, MD   metoprolol succinate (TOPROL-XL) 50 mg 24 hr tablet Take 50 mg by mouth daily 3/27/23  Yes Historical Provider, MD   oxyCODONE (OXY-IR) 5 MG capsule Take 5 mg by mouth 3 (three) times a day   Yes Historical Provider, MD   pantoprazole (PROTONIX) 40 mg tablet Take 40 mg by mouth 2 (two) times a day   Yes Historical Provider, MD   pramipexole (MIRAPEX) 0.125 mg tablet Take 0.25 mg by mouth daily at bedtime 5/11/21  Yes Historical Provider, MD   Albuterol Sulfate (VENTOLIN HFA IN) Inhale  Patient not taking: Reported on 7/6/2023    Historical Provider, MD   fluticasone (FLONASE) 50 mcg/act nasal spray 2 sprays into each nostril daily as needed for allergies    Historical Provider, MD   isosorbide mononitrate (IMDUR) 30 mg 24 hr tablet Take 30 mg by mouth daily 6/29/23 9/27/23  Historical Provider, MD       Family History:     History reviewed. No pertinent family history. Social History:       Social History     Socioeconomic History   • Marital status: Single     Spouse name: None   • Number of children: None   • Years of education: None   • Highest education level: None   Occupational History   • None   Tobacco Use   • Smoking status: Former     Passive exposure: Never   • Smokeless tobacco: Never   • Tobacco comments:     Quit 20 yrs ago   Vaping Use   • Vaping Use: Never used   Substance and Sexual Activity   • Alcohol use: Yes     Comment: Once every 2 weeks   • Drug use: Never   • Sexual activity: None   Other Topics Concern   • None   Social History Narrative   • None     Social Determinants of Health     Financial Resource Strain: Not on file   Food Insecurity: Not on file   Transportation Needs: Not on file   Physical Activity: Not on file   Stress: Not on file   Social Connections: Not on file   Intimate Partner Violence: Not on file   Housing Stability: Not on file       ROS:  Symptoms per HPI  Hard of hearing  Chronic low back pain  Recent sinus congestion and rhinitis  The remainder of the review of systems is negative    Exam:  General:  Alert, normally conversant, comfortable appearing  Head: Normocephalic, atraumatic. Eyes:  EOMI. Pupils - equal, round, reactive to accomodation. No icterus. Normal Conjunctiva. Oropharynx: Moist without lesion  Neck:  No gross bruit, JVD, thyromegaly, or lymphadenopathy  Heart:  Regular with controlled rate. No rub nor pathologic murmur  Lungs:  Clear without rales/rhonchi/wheeze  Abdomen:  Soft and nontender with normal bowel sounds. No organomegaly or mass  Lower Limbs:  No edema  Pulses:  RLE - DP:  2+                LLE - DP:  2+  Musculoskeletal: Independent movement of limbs observed, Formal ROM and strength eval not performed  Neurologic:    Oriented to: person, place, situation.      Cranial Nerves: grossly intact - vision, smell, taste, and hearing were not tested. Motor function: grossly normal, symmetric   Sensation: Was not tested      Vitals:    07/06/23 2201 07/06/23 2241 07/07/23 0322 07/07/23 0700   BP: 137/61 151/74 151/68 121/71   BP Location: Right arm Right arm Right arm Right arm   Pulse: 64 61 59 60   Resp: 17 17 18 18   Temp:  98.5 °F (36.9 °C) 97.7 °F (36.5 °C) 98.5 °F (36.9 °C)   TempSrc:  Temporal Temporal Temporal   SpO2: 95% 97% 98% 98%   Weight:  78.2 kg (172 lb 6.4 oz)     Height:  6' (1.829 m)             DATA:      -----------  ECG:                        -----------------------------------------------------------------------------------------------------------------------------------------------  Echocardiogram  6/28/23, [facility]  Narrative    •  Left Ventricle: Left ventricle is normal in size. Systolic function is   normal with an ejection fraction of 55-60%. Wall motion is within normal   limits. There is grade I (mild) diastolic dysfunction and normal left   atrial pressure. •  Left Atrium: Left atrium cavity size is normal.   •  Right Ventricle: Right ventricle cavity is normal. Systolic function is   normal.   •  Mitral Valve: There is trace regurgitation. •  Tricuspid Valve: There is trace regurgitation. Left Ventricle   Left ventricle is normal in size. Wall thickness is normal. Systolic function is normal with an ejection fraction of 55-60%. Wall motion is within normal limits. There is grade I (mild) diastolic dysfunction and normal left atrial pressure. Right Ventricle   Right ventricle cavity is normal. Systolic function is normal.     Left Atrium   Left atrium cavity size is normal.     Right Atrium   Right atrium cavity is normal.     IVC/SVC   The inferior vena cava demonstrates a diameter of <=21 mm and collapses >50%; therefore, the right atrial pressure is estimated at 0-5 mmHg. Mitral Valve   Mitral valve structure is normal. There is trace regurgitation.  There is no evidence of mitral valve stenosis. Tricuspid Valve   Tricuspid valve structure is normal. There is trace regurgitation. There is no evidence of tricuspid valve stenosis. Aortic Valve   The aortic valve is trileaflet. There is no regurgitation or stenosis. Pulmonic Valve   Pulmonic valve structure is normal. There is no regurgitation or stenosis. Ascending Aorta   The aorta was not well visualized. Pericardium   There is a small pericardial effusion posterior to the heart. Study Details   A complete 2D echocardiogram was performed. Color flow, Pulse Wave and Continuous Wave Doppler was performed and analyzed. The study had technical difficulties. The study was difficult due to patient's body habitus.      -----------------------------------------------------------------------------------------------------------------------------------------------  Ischemic Testing:  Stress test, 6/28/2023, [address]: 1. No definite evidence of infarct or ischemia 2. Normal left ventricular systolic function with ejection fraction of 62% Workstation:[id number]         -----------------------------------------------------------------------------------------------------------------------------------------------  Weights: Wt Readings from Last 20 Encounters:   07/06/23 78.2 kg (172 lb 6.4 oz)   05/13/21 89.9 kg (198 lb 3.1 oz)   , Body mass index is 23.38 kg/m².          Lab Studies:               Results from last 7 days   Lab Units 07/07/23  0541 07/1953   WBC Thousand/uL 10.96* 11.08*   HEMOGLOBIN g/dL 11.3* 10.4*   HEMATOCRIT % 35.2* 32.7*   PLATELETS Thousands/uL 208 202   ,   Results from last 7 days   Lab Units 07/07/23  0541 07/1953   POTASSIUM mmol/L 3.6 5.1   CHLORIDE mmol/L 106 109*   CO2 mmol/L 27 24   BUN mg/dL 25 28*   CREATININE mg/dL 1.38* 1.62*   CALCIUM mg/dL 8.5 8.5   ALK PHOS U/L  --  59   ALT U/L  --  15   AST U/L --  28

## 2023-07-07 NOTE — ASSESSMENT & PLAN NOTE
· Presents with complaints of exertional chest pain and dyspnea  · Recent admission at [facility] 6/27 for same. · Hx of exertional Angina in the setting of known complex CAD with MERRITT to LAD, RCA, and most recently to the LCx and OM1 in June 2022  · Work up included EKG and troponin x 3 WNL. Telemetry without any arrhythmias. · S/p lexiscan and echo. Results were normal without ischemic changes. · Imdur 30mg daily added  · TARAS Score: 3  · EKG: Negative for acute ischemic changes, normal troponin. Cycle troponins and EKG  · Patient was loaded with aspirin and Plavix. Nitroglycerin as needed. Continue metoprolol, statin and remainder of cardiac medications  · Please refer to below under coronary artery disease. Patient underwent cardiac catheterization which revealed diffuse cardiac disease for which the patient was recommended a transfer to [facility] for cardiothoracic surgery evaluation for potential need for CABG.   Patient in agreement

## 2023-07-07 NOTE — PLAN OF CARE
Problem: Potential for Falls  Goal: Patient will remain free of falls  Description: INTERVENTIONS:  - Educate patient/family on patient safety including physical limitations  - Instruct patient to call for assistance with activity   - Consult OT/PT to assist with strengthening/mobility   - Keep Call bell within reach  - Keep bed low and locked with side rails adjusted as appropriate  - Keep care items and personal belongings within reach  - Initiate and maintain comfort rounds  - Make Fall Risk Sign visible to staff  - Offer Toileting every  Hours, in advance of need  - Initiate/Maintain alarm  - Obtain necessary fall risk management equipment:   - Apply yellow socks and bracelet for high fall risk patients  - Consider moving patient to room near nurses station  Outcome: Progressing     Problem: PAIN - ADULT  Goal: Verbalizes/displays adequate comfort level or baseline comfort level  Description: Interventions:  - Encourage patient to monitor pain and request assistance  - Assess pain using appropriate pain scale  - Administer analgesics based on type and severity of pain and evaluate response  - Implement non-pharmacological measures as appropriate and evaluate response  - Consider cultural and social influences on pain and pain management  - Notify physician/advanced practitioner if interventions unsuccessful or patient reports new pain  Outcome: Progressing     Problem: INFECTION - ADULT  Goal: Absence or prevention of progression during hospitalization  Description: INTERVENTIONS:  - Assess and monitor for signs and symptoms of infection  - Monitor lab/diagnostic results  - Monitor all insertion sites, i.e. indwelling lines, tubes, and drains  - Monitor endotracheal if appropriate and nasal secretions for changes in amount and color  - [place] appropriate cooling/warming therapies per order  - Administer medications as ordered  - Instruct and encourage patient and family to use good hand hygiene technique  - Identify and instruct in appropriate isolation precautions for identified infection/condition  Outcome: Progressing  Goal: Absence of fever/infection during neutropenic period  Description: INTERVENTIONS:  - Monitor WBC    Outcome: Progressing     Problem: SAFETY ADULT  Goal: Patient will remain free of falls  Description: INTERVENTIONS:  - Educate patient/family on patient safety including physical limitations  - Instruct patient to call for assistance with activity   - Consult OT/PT to assist with strengthening/mobility   - Keep Call bell within reach  - Keep bed low and locked with side rails adjusted as appropriate  - Keep care items and personal belongings within reach  - Initiate and maintain comfort rounds  - Make Fall Risk Sign visible to staff  - Offer Toileting every  Hours, in advance of need  - Initiate/Maintain alarm  - Obtain necessary fall risk management equipment:   - Apply yellow socks and bracelet for high fall risk patients  - Consider moving patient to room near nurses station  Outcome: Progressing  Goal: Maintain or return to baseline ADL function  Description: INTERVENTIONS:  -  Assess patient's ability to carry out ADLs; assess patient's baseline for ADL function and identify physical deficits which impact ability to perform ADLs (bathing, care of mouth/teeth, toileting, grooming, dressing, etc.)  - Assess/evaluate cause of self-care deficits   - Assess range of motion  - Assess patient's mobility; develop plan if impaired  - Assess patient's need for assistive devices and provide as appropriate  - Encourage maximum independence but intervene and supervise when necessary  - Involve family in performance of ADLs  - Assess for home care needs following discharge   - Consider OT consult to assist with ADL evaluation and planning for discharge  - Provide patient education as appropriate  Outcome: Progressing  Goal: Maintains/Returns to pre admission functional level  Description: INTERVENTIONS:  - Perform BMAT or MOVE assessment daily.   - Set and communicate daily mobility goal to care team and patient/family/caregiver. - Collaborate with rehabilitation services on mobility goals if consulted  - Perform Range of Motion  times a day. - Reposition patient every  hours. - Dangle patient  times a day  - Stand patient  times a day  - Ambulate patient  times a day  - Out of bed to chair  times a day   - Out of bed for meals times a day  - Out of bed for toileting  - Record patient progress and toleration of activity level   Outcome: Progressing     Problem: DISCHARGE PLANNING  Goal: Discharge to home or other facility with appropriate resources  Description: INTERVENTIONS:  - Identify barriers to discharge w/patient and caregiver  - Arrange for needed discharge resources and transportation as appropriate  - Identify discharge learning needs (meds, wound care, etc.)  - Arrange for interpretive services to assist at discharge as needed  - Refer to Case Management Department for coordinating discharge planning if the patient needs post-hospital services based on physician/advanced practitioner order or complex needs related to functional status, cognitive ability, or social support system  Outcome: Progressing     Problem: Knowledge Deficit  Goal: Patient/family/caregiver demonstrates understanding of disease process, treatment plan, medications, and discharge instructions  Description: Complete learning assessment and assess knowledge base.   Interventions:  - Provide teaching at level of understanding  - Provide teaching via preferred learning methods  Outcome: Progressing

## 2023-07-07 NOTE — DISCHARGE SUMMARY
[facility]  Discharge- Concepción Damico 1955, 69 y.o. male MRN: [id number]  Unit/Bed#: E4 -01 Encounter: [id number]  Primary Care Provider: Julio Naik DO   Date and time admitted to hospital: 7/6/2023  7:12 PM    * Angina pectoris [facility])  Assessment & Plan  · Presents with complaints of exertional chest pain and dyspnea  · Recent admission at [facility] AT THE [facility] 6/27 for same. · Hx of exertional Angina in the setting of known complex CAD with MERRITT to LAD, RCA, and most recently to the LCx and OM1 in June 2022  · Work up included EKG and troponin x 3 WNL. Telemetry without any arrhythmias. · S/p lexiscan and echo. Results were normal without ischemic changes. · Imdur 30mg daily added  · TARAS Score: 3  · EKG: Negative for acute ischemic changes, normal troponin. Cycle troponins and EKG  · Patient was loaded with aspirin and Plavix. Nitroglycerin as needed. Continue metoprolol, statin and remainder of cardiac medications  · Please refer to below under coronary artery disease. Patient underwent cardiac catheterization which revealed diffuse cardiac disease for which the patient was recommended a transfer to [address] [place] [address] for cardiothoracic surgery evaluation for potential need for CABG. Patient in agreement    Coronary artery disease of native artery of native heart with stable angina pectoris [facility])  Assessment & Plan  · CAD s/p prior stenting LAD, RCA, OM, and left circumflex. Followed outpatient at [facility]  · Status post cardiac catheterization today, 7/7/2023 which revealed diffuse coronary artery disease:    Left Anterior Descending   Ost LAD lesion is 90% stenosed. Culprit for clinical presentation. TARAS flow is 3. Previously placed Mid LAD stent of unknown type is widely patent. Not the culprit lesion. TARAS flow is 3. Left Circumflex   There is moderate diffuse disease throughout the vessel. Ost Cx lesion is 50% stenosed.    Previously placed Ost Cx to Prox Cx stent of unknown type is widely patent. Not the culprit lesion. TARAS flow is 3. Right Coronary Artery   There is mild diffuse disease throughout the vessel. Previously placed Mid RCA to Dist RCA stent of unknown type is widely patent. Not the culprit lesion. TARAS flow is 3.        · Patient is recommended a [facility] surgery evaluation for potential need for CABG, patient agreeable  · Received loading dose of ASA/Plavix  · Continue home meds ASA, Toprol, losartan and statin    Stage 3a chronic kidney disease [facility])  Assessment & Plan  Lab Results   Component Value Date    EGFR 51 07/07/2023    EGFR 42 07/06/2023    EGFR 58 05/14/2021    CREATININE 1.38 (H) 07/07/2023    CREATININE 1.62 (H) 07/06/2023    CREATININE 1.28 05/14/2021   At baseline with more recent baseline creatinine 1.3-1.5    [place] (hard of hearing)  Assessment & Plan  · Very hard of hearing. Outpatient follow-up with ENT    Uncomplicated opioid dependence ([address])  Assessment & Plan  · Continue oxybutynin IR 5 mg 3 times daily. Verified on PDMP    Mild persistent asthma without complication  Assessment & Plan  · No acute exacerbation, not on daily inhaler    Restless legs  Assessment & Plan  · Mirapex nightly; takes at 5 PM    Other hyperlipidemia  Assessment & Plan  · Continue statin    Gastroesophageal reflux disease without esophagitis  Assessment & Plan  · Continue pantoprazole 40 mg twice daily    Medical Problems     Resolved Problems  Date Reviewed: 7/7/2023   None       Discharging Physician / Practitioner: Mary Evans MD  PCP: Jose Serrano DO  Admission Date:   Admission Orders (From admission, onward)     Ordered        07/06/23 2149  Place in Observation  Once                      Discharge Date: 07/07/23    Left Anterior Descending   Ost LAD lesion is 90% stenosed. Culprit for clinical presentation. TARAS flow is 3. Previously placed Mid LAD stent of unknown type is widely patent. Not the culprit lesion.  TARAS flow is 3. Left Circumflex   There is moderate diffuse disease throughout the vessel. Ost Cx lesion is 50% stenosed. Previously placed Ost Cx to Prox Cx stent of unknown type is widely patent. Not the culprit lesion. TARAS flow is 3. Right Coronary Artery   There is mild diffuse disease throughout the vessel. Previously placed Mid RCA to Dist RCA stent of unknown type is widely patent. Not the culprit lesion. TARAS flow is 3. Consultations During Hospital Stay:  · Cardiology    Procedures Performed:   · Cardiac catheterization 7/7/2023    Significant Findings / Test Results:   XR chest 1 view portable   ED Interpretation by Radha Turcios MD (07/06 2016)   No acute caridopulmonary disease      Final Result by Brandon Cabrera MD (07/07 2718)      No acute cardiopulmonary disease. Workstation performed: [id number]           Results from last 7 days   Lab Units 07/07/23  0541   WBC Thousand/uL 10.96*   HEMOGLOBIN g/dL 11.3*   HEMATOCRIT % 35.2*   PLATELETS Thousands/uL 208     Results from last 7 days   Lab Units 07/07/23  0541   SODIUM mmol/L 140   POTASSIUM mmol/L 3.6   CHLORIDE mmol/L 106   CO2 mmol/L 27   BUN mg/dL 25   CREATININE mg/dL 1.38*   CALCIUM mg/dL 8.5       Test Results Pending at Discharge (will require follow up): · None     Outpatient Tests Requested:  · N/a    Complications:  None    Reason for Admission: chest pain    Hospital Course:   Josue Villanueva is a 69 y.o. male patient with history of coronary artery disease status post multiple MERRITT, hypertension, CKD stage III, hearing deficit and asthma who originally presented to the hospital on 7/6/2023 due to exertional chest pains. ACS was ruled out. The patient underwent cardiac cath which revealed diffuse disease. Per cardiology recommendations arrangements were made for the patient to be transferred to [facility] for cardiothoracic evaluation for CABG considerations.   The patient was in agreement with the plan. He remained hemodynamically stable on the [facility] floor. Please see above list of diagnoses and related plan for additional information. Condition at Discharge: stable    Discharge Day Visit / Exam:   Subjective: Patient reports feeling okay following cardiac cath. He voices no acute complaints. He is awaiting being transferred to [address] [place] [address]. Vitals: Blood Pressure: 112/61 (07/07/23 1746)  Pulse: 56 (07/07/23 1746)  Temperature: 98 °F (36.7 °C) (07/07/23 1746)  Temp Source: Temporal (07/07/23 1746)  Respirations: 18 (07/07/23 1746)  Height: 6' (182.9 cm) (07/07/23 1100)  Weight - Scale: 83.1 kg (183 lb 4.8 oz) (07/07/23 1100)  SpO2: 95 % (07/07/23 1746)  Exam:   Physical Exam  Constitutional:       General: He is not in acute distress. HENT:      Head: Normocephalic and atraumatic. Nose: No congestion. Eyes:      Conjunctiva/sclera: Conjunctivae normal.   Cardiovascular:      Rate and Rhythm: Normal rate and regular rhythm. Heart sounds: No murmur heard. Pulmonary:      Effort: No respiratory distress. Breath sounds: No wheezing or rales. Abdominal:      General: There is no distension. Tenderness: There is no abdominal tenderness. There is no guarding. Musculoskeletal:      Right lower leg: No edema. Left lower leg: No edema. Skin:     General: Skin is warm and dry. Neurological:      Mental Status: He is oriented to person, place, and time. Comments: Hearing deficit       Discharge instructions/Information to patient and family:   See after visit summary for information provided to patient and family. Provisions for Follow-Up Care:  See after visit summary for information related to follow-up care and any pertinent home health orders. Disposition:   [address] Transfer to [facility]    Planned Readmission: No     Discharge Statement:  I spent 35 minutes discharging the patient.  This time was spent on the day of discharge. I had direct contact with the patient on the day of discharge. Greater than 50% of the total time was spent examining patient, answering all patient questions, arranging and discussing plan of care with patient as well as directly providing post-discharge instructions. Additional time then spent on discharge activities. Discharge Medications:  See after visit summary for reconciled discharge medications provided to patient and/or family.       **Please Note: This note may have been constructed using a voice recognition system**

## 2023-07-07 NOTE — ASSESSMENT & PLAN NOTE
· Presents with complaints of exertional chest pain and dyspnea  · Recent admission at [facility] AT THE [facility] 6/27 for same. · Hx of exertional Angina in the setting of known complex CAD with MERRITT to LAD, RCA, and most recently to the LCx and OM1 in June 2022  · Work up included EKG and troponin x 3 WNL. Telemetry without any arrhythmias. · S/p lexiscan and echo. Results were normal without ischemic changes. · Imdur 30mg daily added  · TARAS Score: 3  · EKG: Negative for acute ischemic changes, normal troponin.   Cycle troponins and EKG  · Continue home meds ASA, statin, BB, Imdur  · Monitor on telemetry  · Cardiology consult  · NPO at mn until evaluated by cardiology

## 2023-07-07 NOTE — H&P
[address] [facility]  H&P  Name: Rowena Lefort 69 y.o. male I MRN: [id number]  Unit/Bed#: E4 -01 I Date of Admission: 7/6/2023   Date of Service: 7/6/2023 I Hospital Day: 0      Assessment/Plan   * Angina pectoris [facility])  Assessment & Plan  · Presents with complaints of exertional chest pain and dyspnea  · Recent admission at [address] 6/27 for same. · Hx of exertional Angina in the setting of known complex CAD with MERRITT to LAD, RCA, and most recently to the LCx and OM1 in June 2022  · Work up included EKG and troponin x 3 WNL. Telemetry without any arrhythmias. · S/p lexiscan and echo. Results were normal without ischemic changes. · Imdur 30mg daily added  · TARAS Score: 3  · EKG: Negative for acute ischemic changes, normal troponin. Cycle troponins and EKG  · Continue home meds ASA, statin, BB, Imdur  · Monitor on telemetry  · Cardiology consult  · NPO at mn until evaluated by cardiology     Low grade fever  Assessment & Plan  · Low-grade fever 100.1F  · Check PCT    Coronary artery disease of native artery of native heart with stable angina pectoris (HCC)  Assessment & Plan  · CAD s/p prior stenting LAD, RCA, OM, and left circumflex. Followed outpatient at [facility]  · Continue home meds ASA, Toprol, losartan and statin    [place] (hard of hearing)  Assessment & Plan  · Very hard of hearing. Outpatient follow-up with ENT    Uncomplicated opioid dependence ([address])  Assessment & Plan  · Continue oxybutynin IR 5 mg 3 times daily.   Verified on PDMP    Mild persistent asthma without complication  Assessment & Plan  · No acute exacerbation, not on daily inhaler    Restless legs  Assessment & Plan  · Mirapex nightly; takes at 5 PM    Other hyperlipidemia  Assessment & Plan  · Continue statin    Gastroesophageal reflux disease without esophagitis  Assessment & Plan  · Continue pantoprazole 40 mg twice daily       VTE Prophylaxis: Heparin  / reason for no mechanical VTE prophylaxis ac   Code Status: FC  POLST: POLST is not applicable to this patient  Discussion with family:     Anticipated Length of Stay:  Patient will be admitted on an Observation basis with an anticipated length of stay of  < 2 midnights. Justification for Hospital Stay: CP angina    Total Time for Visit, including Counseling / Coordination of Care: 60 minutes. Greater than 50% of this total time spent on direct patient counseling and coordination of care. Chief Complaint:   "Chest pain and SOB when walking"    History of Present Illness:    Josie Morrissey is a 69 y.o. male who presents with c/o exertional chest pain and dyspnea. PMH as above. Patient is very hard of hearing. Reports pain started in his right ear then traveled down to his sinuses and eventually to his chest.  Chronic exertional chest pain and dyspnea with recent admission at [facility] 6/27 to 6/28 for same. Cardiac work-up including EKG, troponin, stress test and echo were unremarkable. Imdur was added to his medical regimen. He states that Imdur improved chest pain that he used to feel at bedtime. He continues to feel exertional chest pain and dyspnea. He is concerned because when he had his first heart attack, EKG was unremarkable at 3 different visits, eventually PMD sent him to the hospital to be evaluated for and he required emergent cardiac stenting. Review of Systems:    Review of Systems   Constitutional: Negative. HENT: Negative. Respiratory: Positive for shortness of breath. Exertional dyspnea   Cardiovascular: Positive for chest pain. Exertional chest pain   Gastrointestinal: Negative. Genitourinary: Negative. Musculoskeletal: Negative. Skin: Negative. Neurological: Negative. Psychiatric/Behavioral: Negative.         Past Medical and Surgical History:     Past Medical History:   Diagnosis Date   • Asthma    • Degenerative disc disease, lumbar        Past Surgical History:   Procedure Laterality Date   • HERNIA REPAIR • TOTAL HIP ARTHROPLASTY Bilateral        Meds/Allergies:    Prior to Admission medications    Medication Sig Start Date End Date Taking? Authorizing Provider   aspirin 81 mg chewable tablet Chew 81 mg daily 6/29/23  Yes Historical Provider, MD   diphenhydrAMINE (BENADRYL) 25 mg capsule Take 25 mg by mouth daily at bedtime as needed for sleep   Yes Historical Provider, MD   Fluticasone-Salmeterol (Advair Diskus) 250-50 mcg/dose inhaler Inhale 1 puff 2 (two) times a day 6/16/23  Yes Historical Provider, MD   linaCLOtide (Linzess) 72 MCG CAPS Take 72 mcg by mouth daily 6/7/23  Yes Historical Provider, MD   losartan (COZAAR) 50 mg tablet Take 50 mg by mouth daily 3/31/23  Yes Historical Provider, MD   Melatonin 10 MG TABS Take 1 tablet by mouth daily at bedtime   Yes Historical Provider, MD   metoprolol succinate (TOPROL-XL) 50 mg 24 hr tablet Take 50 mg by mouth daily 3/27/23  Yes Historical Provider, MD   oxyCODONE (OXY-IR) 5 MG capsule Take 5 mg by mouth 2 (two) times a day   Yes Historical Provider, MD   pantoprazole (PROTONIX) 40 mg tablet Take 40 mg by mouth 2 (two) times a day   Yes Historical Provider, MD   pramipexole (MIRAPEX) 0.125 mg tablet Take 0.25 mg by mouth daily at bedtime 5/11/21  Yes Historical Provider, MD   Albuterol Sulfate (VENTOLIN HFA IN) Inhale  Patient not taking: Reported on 7/6/2023    Historical Provider, MD   budesonide-formoterol (SYMBICORT) 160-4.5 mcg/act inhaler Inhale 2 puffs 2 (two) times a day Rinse mouth after use.   Patient not taking: Reported on 7/6/2023    Historical Provider, MD   fluticasone (FLONASE) 50 mcg/act nasal spray 2 sprays into each nostril daily    Historical Provider, MD   Fluticasone Furoate-Vilanterol (BREO ELLIPTA IN) Inhale  Patient not taking: Reported on 7/6/2023    Historical Provider, MD   isosorbide mononitrate (IMDUR) 30 mg 24 hr tablet Take 30 mg by mouth daily 6/29/23 9/27/23  Historical Provider, MD   meclizine (ANTIVERT) 25 mg tablet Take 1 tablet (25 mg total) by mouth 3 (three) times a day as needed for dizziness 5/13/21   Jefry Obando MD     I have reviewed home medications with patient personally. Allergies: Allergies   Allergen Reactions   • Shellfish Allergy - Food Allergy Hives     Per patient, states "I can't clean raw shellfish, but I can eat SHRIMP"    • Shellfish-Derived Products - Food Allergy Hives     Per patient, states "I can't clean raw shellfish, but I can eat SHRIMP"    • Penicillins Hives   • Pollen Extract Hives     Per patient, burning itchy eyes, stuffy nose, asthma symptoms. Social History:     Marital Status: Single   Occupation: retired; artist  Patient Pre-hospital Living Situation: homeless  Patient Pre-hospital Level of Mobility: ambulatory  Patient Pre-hospital Diet Restrictions:   Substance Use History:   Social History     Substance and Sexual Activity   Alcohol Use Yes    Comment: Once every 2 weeks     Social History     Tobacco Use   Smoking Status Former   • Passive exposure: Never   Smokeless Tobacco Never   Tobacco Comments    Quit 20 yrs ago     Social History     Substance and Sexual Activity   Drug Use Never       Family History:    History reviewed. No pertinent family history. Physical Exam:     Vitals:   Blood Pressure: 151/74 (07/06/23 2241)  Pulse: 61 (07/06/23 2241)  Temperature: 98.5 °F (36.9 °C) (07/06/23 2241)  Temp Source: Temporal (07/06/23 2241)  Respirations: 17 (07/06/23 2241)  Height: 6' (182.9 cm) (07/06/23 2241)  Weight - Scale: 78.2 kg (172 lb 6.4 oz) (07/06/23 2241)  SpO2: 97 % (07/06/23 2241)    Physical Exam  Constitutional:       General: He is not in acute distress. Appearance: Normal appearance. He is normal weight. He is not ill-appearing, toxic-appearing or diaphoretic. HENT:      Head: Normocephalic and atraumatic. Ears:      Comments: Very hard of hearing     Nose: No congestion.       Mouth/Throat:      Mouth: Mucous membranes are moist.   Eyes: Conjunctiva/sclera: Conjunctivae normal.   Cardiovascular:      Rate and Rhythm: Normal rate and regular rhythm. Heart sounds: Normal heart sounds. Pulmonary:      Effort: Pulmonary effort is normal.      Breath sounds: Normal breath sounds. Abdominal:      General: Bowel sounds are normal.      Palpations: Abdomen is soft. Musculoskeletal:      Right lower leg: No edema. Left lower leg: No edema. Skin:     General: Skin is warm. Neurological:      Mental Status: He is alert and oriented to person, place, and time. Psychiatric:         Behavior: Behavior normal.         Thought Content: Thought content normal.         Judgment: Judgment normal.       Additional Data:     Lab Results: I have personally reviewed pertinent reports. Results from last 7 days   Lab Units 07/06/23  1953   WBC Thousand/uL 11.08*   HEMOGLOBIN g/dL 10.4*   HEMATOCRIT % 32.7*   PLATELETS Thousands/uL 202   NEUTROS PCT % 62   LYMPHS PCT % 24   MONOS PCT % 10   EOS PCT % 3     Results from last 7 days   Lab Units 07/06/23  1953   SODIUM mmol/L 139   POTASSIUM mmol/L 5.1   CHLORIDE mmol/L 109*   CO2 mmol/L 24   BUN mg/dL 28*   CREATININE mg/dL 1.62*   ANION GAP mmol/L 6   CALCIUM mg/dL 8.5   ALBUMIN g/dL 4.0   TOTAL BILIRUBIN mg/dL 0.64   ALK PHOS U/L 59   ALT U/L 15   AST U/L 28   GLUCOSE RANDOM mg/dL 76                       Imaging: I have personally reviewed pertinent reports. XR chest 1 view portable   ED Interpretation by Juana Baldwin MD (07/06 2016)   No acute caridopulmonary disease          EKG, Pathology, and Other Studies Reviewed on Admission:   · EKG: cxr echo stress test    [facility]     ** Please Note: This note has been constructed using a voice recognition system.  **

## 2023-07-07 NOTE — ASSESSMENT & PLAN NOTE
· CAD s/p prior stenting LAD, RCA, OM, and left circumflex.   Followed outpatient at [facility]  · Continue home meds ASA, Toprol, losartan and statin

## 2023-07-07 NOTE — ASSESSMENT & PLAN NOTE
Lab Results   Component Value Date    EGFR 51 07/07/2023    EGFR 42 07/06/2023    EGFR 58 05/14/2021    CREATININE 1.38 (H) 07/07/2023    CREATININE 1.62 (H) 07/06/2023    CREATININE 1.28 05/14/2021   At baseline with more recent baseline creatinine 1.3-1.5

## 2023-07-07 NOTE — PLAN OF CARE

## 2023-07-07 NOTE — ASSESSMENT & PLAN NOTE
· CAD s/p prior stenting LAD, RCA, OM, and left circumflex. Followed outpatient at [facility]  · Status post cardiac catheterization today, 7/7/2023 which revealed diffuse coronary artery disease:    Left Anterior Descending   Ost LAD lesion is 90% stenosed. Culprit for clinical presentation. TARAS flow is 3. Previously placed Mid LAD stent of unknown type is widely patent. Not the culprit lesion. TARAS flow is 3. Left Circumflex   There is moderate diffuse disease throughout the vessel. Ost Cx lesion is 50% stenosed. Previously placed Ost Cx to Prox Cx stent of unknown type is widely patent. Not the culprit lesion. TARAS flow is 3. Right Coronary Artery   There is mild diffuse disease throughout the vessel. Previously placed Mid RCA to Dist RCA stent of unknown type is widely patent. Not the culprit lesion.  TARAS flow is 3.        · Patient is recommended a transfer to [facility] for CT surgery evaluation for potential need for CABG, patient agreeable  · Received loading dose of ASA/Plavix  · Continue home meds ASA, Toprol, losartan and statin

## 2023-07-08 ENCOUNTER — APPOINTMENT (INPATIENT)
Dept: RADIOLOGY | Facility: HOSPITAL | Age: 68
DRG: 247 | End: 2023-07-08
Payer: MEDICARE

## 2023-07-08 ENCOUNTER — APPOINTMENT (INPATIENT)
Dept: NON INVASIVE DIAGNOSTICS | Facility: HOSPITAL | Age: 68
DRG: 247 | End: 2023-07-08
Payer: MEDICARE

## 2023-07-08 PROBLEM — I25.10 CORONARY ARTERY DISEASE: Status: ACTIVE | Noted: 2023-07-06

## 2023-07-08 PROBLEM — E78.5 HLD (HYPERLIPIDEMIA): Chronic | Status: ACTIVE | Noted: 2021-05-13

## 2023-07-08 PROBLEM — I10 PRIMARY HYPERTENSION: Chronic | Status: ACTIVE | Noted: 2023-07-08

## 2023-07-08 PROBLEM — K58.1 IRRITABLE BOWEL SYNDROME WITH CONSTIPATION: Status: ACTIVE | Noted: 2023-07-08

## 2023-07-08 LAB
ANION GAP SERPL CALCULATED.3IONS-SCNC: 5 MMOL/L
AORTIC ROOT: 3.6 CM
APICAL FOUR CHAMBER EJECTION FRACTION: 50 %
ASCENDING AORTA: 3.1 CM
BACTERIA UR QL AUTO: ABNORMAL /HPF
BASOPHILS # BLD AUTO: 0.04 THOUSANDS/ÂΜL (ref 0–0.1)
BASOPHILS NFR BLD AUTO: 0 % (ref 0–1)
BILIRUB UR QL STRIP: NEGATIVE
BUN SERPL-MCNC: 22 MG/DL (ref 5–25)
CALCIUM SERPL-MCNC: 8.1 MG/DL (ref 8.3–10.1)
CHLORIDE SERPL-SCNC: 113 MMOL/L (ref 96–108)
CLARITY UR: CLEAR
CO2 SERPL-SCNC: 24 MMOL/L (ref 21–32)
COLOR UR: YELLOW
CREAT SERPL-MCNC: 1.42 MG/DL (ref 0.6–1.3)
E WAVE DECELERATION TIME: 160 MS
EOSINOPHIL # BLD AUTO: 0.22 THOUSAND/ÂΜL (ref 0–0.61)
EOSINOPHIL NFR BLD AUTO: 2 % (ref 0–6)
ERYTHROCYTE [DISTWIDTH] IN BLOOD BY AUTOMATED COUNT: 13.4 % (ref 11.6–15.1)
FLUAV RNA RESP QL NAA+PROBE: NEGATIVE
FLUBV RNA RESP QL NAA+PROBE: NEGATIVE
FRACTIONAL SHORTENING: 26 (ref 28–44)
GFR SERPL CREATININE-BSD FRML MDRD: 50 ML/MIN/1.73SQ M
GLUCOSE SERPL-MCNC: 97 MG/DL (ref 65–140)
GLUCOSE UR STRIP-MCNC: NEGATIVE MG/DL
HCT VFR BLD AUTO: 35.2 % (ref 36.5–49.3)
HGB BLD-MCNC: 11.3 G/DL (ref 12–17)
HGB UR QL STRIP.AUTO: NEGATIVE
IMM GRANULOCYTES # BLD AUTO: 0.03 THOUSAND/UL (ref 0–0.2)
IMM GRANULOCYTES NFR BLD AUTO: 0 % (ref 0–2)
INTERVENTRICULAR SEPTUM IN DIASTOLE (PARASTERNAL SHORT AXIS VIEW): 1.1 CM
INTERVENTRICULAR SEPTUM: 1.1 CM (ref 0.6–1.1)
KETONES UR STRIP-MCNC: NEGATIVE MG/DL
LAAS-AP2: 18.2 CM2
LAAS-AP4: 20.2 CM2
LEFT ATRIUM SIZE: 3.8 CM
LEFT ATRIUM VOLUME (MOD BIPLANE): 51 ML
LEFT INTERNAL DIMENSION IN SYSTOLE: 3.5 CM (ref 2.1–4)
LEFT VENTRICULAR INTERNAL DIMENSION IN DIASTOLE: 4.7 CM (ref 3.5–6)
LEFT VENTRICULAR POSTERIOR WALL IN END DIASTOLE: 1 CM
LEFT VENTRICULAR STROKE VOLUME: 52 ML
LEUKOCYTE ESTERASE UR QL STRIP: NEGATIVE
LVSV (TEICH): 52 ML
LYMPHOCYTES # BLD AUTO: 2.25 THOUSANDS/ÂΜL (ref 0.6–4.47)
LYMPHOCYTES NFR BLD AUTO: 22 % (ref 14–44)
MAGNESIUM SERPL-MCNC: 1.9 MG/DL (ref 1.6–2.6)
MCH RBC QN AUTO: 29.4 PG (ref 26.8–34.3)
MCHC RBC AUTO-ENTMCNC: 32.1 G/DL (ref 31.4–37.4)
MCV RBC AUTO: 91 FL (ref 82–98)
MONOCYTES # BLD AUTO: 1.04 THOUSAND/ÂΜL (ref 0.17–1.22)
MONOCYTES NFR BLD AUTO: 10 % (ref 4–12)
MUCOUS THREADS UR QL AUTO: ABNORMAL
MV E'TISSUE VEL-SEP: 10 CM/S
MV PEAK A VEL: 0.97 M/S
MV PEAK E VEL: 86 CM/S
MV STENOSIS PRESSURE HALF TIME: 46 MS
MV VALVE AREA P 1/2 METHOD: 4.78
NEUTROPHILS # BLD AUTO: 6.53 THOUSANDS/ÂΜL (ref 1.85–7.62)
NEUTS SEG NFR BLD AUTO: 66 % (ref 43–75)
NITRITE UR QL STRIP: NEGATIVE
NON-SQ EPI CELLS URNS QL MICRO: ABNORMAL /HPF
NRBC BLD AUTO-RTO: 0 /100 WBCS
PH UR STRIP.AUTO: 5.5 [PH]
PLATELET # BLD AUTO: 209 THOUSANDS/UL (ref 149–390)
PMV BLD AUTO: 10.3 FL (ref 8.9–12.7)
POTASSIUM SERPL-SCNC: 3.8 MMOL/L (ref 3.5–5.3)
PROCALCITONIN SERPL-MCNC: <0.05 NG/ML
PROT UR STRIP-MCNC: ABNORMAL MG/DL
RBC # BLD AUTO: 3.85 MILLION/UL (ref 3.88–5.62)
RBC #/AREA URNS AUTO: ABNORMAL /HPF
RIGHT ATRIUM AREA SYSTOLE A4C: 16.7 CM2
RIGHT VENTRICLE ID DIMENSION: 3.1 CM
RSV RNA RESP QL NAA+PROBE: NEGATIVE
SARS-COV-2 RNA RESP QL NAA+PROBE: NEGATIVE
SL CV LEFT ATRIUM LENGTH A2C: 5.6 CM
SL CV LV EF: 50
SL CV PED ECHO LEFT VENTRICLE DIASTOLIC VOLUME (MOD BIPLANE) 2D: 103 ML
SL CV PED ECHO LEFT VENTRICLE SYSTOLIC VOLUME (MOD BIPLANE) 2D: 51 ML
SODIUM SERPL-SCNC: 142 MMOL/L (ref 135–147)
SP GR UR STRIP.AUTO: 1.03 (ref 1–1.03)
TR MAX PG: 28 MMHG
TR PEAK VELOCITY: 2.6 M/S
TRICUSPID ANNULAR PLANE SYSTOLIC EXCURSION: 2.4 CM
TRICUSPID VALVE PEAK REGURGITATION VELOCITY: 2.64 M/S
UROBILINOGEN UR STRIP-ACNC: 2 MG/DL
WBC # BLD AUTO: 10.11 THOUSAND/UL (ref 4.31–10.16)
WBC #/AREA URNS AUTO: ABNORMAL /HPF

## 2023-07-08 PROCEDURE — 84145 PROCALCITONIN (PCT): CPT | Performed by: INTERNAL MEDICINE

## 2023-07-08 PROCEDURE — 99223 1ST HOSP IP/OBS HIGH 75: CPT | Performed by: THORACIC SURGERY (CARDIOTHORACIC VASCULAR SURGERY)

## 2023-07-08 PROCEDURE — 93306 TTE W/DOPPLER COMPLETE: CPT | Performed by: INTERNAL MEDICINE

## 2023-07-08 PROCEDURE — 99232 SBSQ HOSP IP/OBS MODERATE 35: CPT | Performed by: INTERNAL MEDICINE

## 2023-07-08 PROCEDURE — 87040 BLOOD CULTURE FOR BACTERIA: CPT | Performed by: INTERNAL MEDICINE

## 2023-07-08 PROCEDURE — 83735 ASSAY OF MAGNESIUM: CPT | Performed by: INTERNAL MEDICINE

## 2023-07-08 PROCEDURE — 93880 EXTRACRANIAL BILAT STUDY: CPT

## 2023-07-08 PROCEDURE — 81001 URINALYSIS AUTO W/SCOPE: CPT | Performed by: INTERNAL MEDICINE

## 2023-07-08 PROCEDURE — 93306 TTE W/DOPPLER COMPLETE: CPT

## 2023-07-08 PROCEDURE — 85025 COMPLETE CBC W/AUTO DIFF WBC: CPT | Performed by: INTERNAL MEDICINE

## 2023-07-08 PROCEDURE — 94760 N-INVAS EAR/PLS OXIMETRY 1: CPT

## 2023-07-08 PROCEDURE — G1004 CDSM NDSC: HCPCS

## 2023-07-08 PROCEDURE — 0241U HB NFCT DS VIR RESP RNA 4 TRGT: CPT | Performed by: INTERNAL MEDICINE

## 2023-07-08 PROCEDURE — 71250 CT THORAX DX C-: CPT

## 2023-07-08 PROCEDURE — 80048 BASIC METABOLIC PNL TOTAL CA: CPT | Performed by: INTERNAL MEDICINE

## 2023-07-08 PROCEDURE — 99233 SBSQ HOSP IP/OBS HIGH 50: CPT | Performed by: INTERNAL MEDICINE

## 2023-07-08 RX ORDER — ALBUTEROL SULFATE 90 UG/1
2 AEROSOL, METERED RESPIRATORY (INHALATION) EVERY 4 HOURS PRN
Status: DISCONTINUED | OUTPATIENT
Start: 2023-07-08 | End: 2023-07-17 | Stop reason: HOSPADM

## 2023-07-08 RX ORDER — DOXYCYCLINE HYCLATE 100 MG/1
100 CAPSULE ORAL EVERY 12 HOURS SCHEDULED
Status: COMPLETED | OUTPATIENT
Start: 2023-07-08 | End: 2023-07-12

## 2023-07-08 RX ORDER — CHLORHEXIDINE GLUCONATE 0.12 MG/ML
15 RINSE ORAL EVERY 12 HOURS SCHEDULED
Status: DISCONTINUED | OUTPATIENT
Start: 2023-07-08 | End: 2023-07-12

## 2023-07-08 RX ADMIN — HEPARIN SODIUM 5000 UNITS: 5000 INJECTION INTRAVENOUS; SUBCUTANEOUS at 13:30

## 2023-07-08 RX ADMIN — HEPARIN SODIUM 5000 UNITS: 5000 INJECTION INTRAVENOUS; SUBCUTANEOUS at 05:48

## 2023-07-08 RX ADMIN — MUPIROCIN 1 APPLICATION: 20 OINTMENT TOPICAL at 10:34

## 2023-07-08 RX ADMIN — ACETAMINOPHEN 975 MG: 325 TABLET, FILM COATED ORAL at 10:35

## 2023-07-08 RX ADMIN — HEPARIN SODIUM 5000 UNITS: 5000 INJECTION INTRAVENOUS; SUBCUTANEOUS at 21:05

## 2023-07-08 RX ADMIN — EZETIMIBE 10 MG: 10 TABLET ORAL at 21:02

## 2023-07-08 RX ADMIN — ATORVASTATIN CALCIUM 80 MG: 80 TABLET, FILM COATED ORAL at 21:02

## 2023-07-08 RX ADMIN — PANTOPRAZOLE SODIUM 40 MG: 40 TABLET, DELAYED RELEASE ORAL at 17:30

## 2023-07-08 RX ADMIN — FLUTICASONE FUROATE AND VILANTEROL TRIFENATATE 1 PUFF: 200; 25 POWDER RESPIRATORY (INHALATION) at 08:09

## 2023-07-08 RX ADMIN — HYDROCORTISONE: 1 CREAM TOPICAL at 08:14

## 2023-07-08 RX ADMIN — CHLORHEXIDINE GLUCONATE 15 ML: 1.2 SOLUTION ORAL at 10:34

## 2023-07-08 RX ADMIN — MUPIROCIN 1 APPLICATION: 20 OINTMENT TOPICAL at 21:08

## 2023-07-08 RX ADMIN — OXYCODONE HYDROCHLORIDE 5 MG: 5 TABLET ORAL at 21:00

## 2023-07-08 RX ADMIN — DOXYCYCLINE 100 MG: 100 CAPSULE ORAL at 13:30

## 2023-07-08 RX ADMIN — LUBIPROSTONE 8 MCG: 8 CAPSULE, GELATIN COATED ORAL at 08:12

## 2023-07-08 RX ADMIN — ISOSORBIDE MONONITRATE 30 MG: 30 TABLET, EXTENDED RELEASE ORAL at 08:12

## 2023-07-08 RX ADMIN — ACETAMINOPHEN 975 MG: 325 TABLET, FILM COATED ORAL at 20:48

## 2023-07-08 RX ADMIN — LUBIPROSTONE 8 MCG: 8 CAPSULE, GELATIN COATED ORAL at 21:02

## 2023-07-08 RX ADMIN — METOPROLOL SUCCINATE 50 MG: 50 TABLET, EXTENDED RELEASE ORAL at 08:12

## 2023-07-08 RX ADMIN — OXYCODONE HYDROCHLORIDE 5 MG: 5 TABLET ORAL at 08:12

## 2023-07-08 RX ADMIN — HYDROCORTISONE: 1 CREAM TOPICAL at 17:31

## 2023-07-08 RX ADMIN — TEMAZEPAM 7.5 MG: 7.5 CAPSULE ORAL at 21:04

## 2023-07-08 RX ADMIN — PRAMIPEXOLE DIHYDROCHLORIDE 0.25 MG: 0.25 TABLET ORAL at 17:30

## 2023-07-08 RX ADMIN — OXYCODONE HYDROCHLORIDE 5 MG: 5 TABLET ORAL at 17:30

## 2023-07-08 RX ADMIN — ASPIRIN 81 MG CHEWABLE TABLET 81 MG: 81 TABLET CHEWABLE at 08:12

## 2023-07-08 RX ADMIN — DOXYCYCLINE 100 MG: 100 CAPSULE ORAL at 21:04

## 2023-07-08 RX ADMIN — PANTOPRAZOLE SODIUM 40 MG: 40 TABLET, DELAYED RELEASE ORAL at 06:00

## 2023-07-08 NOTE — PROGRESS NOTES
[address] [facility]  Progress Note  Name: Elizabeth Farah  MRN: [id number]  [facility] I Date of Admission: 7/7/2023   Date of Service: 7/8/2023 I Hospital Day: 1    Assessment/Plan   * Coronary artery disease  Assessment & Plan  · Presented to [facility] on 7/6/2023 with a 4-week history of progressively worsening chest discomfort with exertion  · H/o inferior MI in December 2021 - s/p MERRITT to dRCA with residual mLAD disease; had angina and underwent cath on 1/3/2022 with MERRITT to mLAD; stable angina and underwent cath in June 2022 s/p MERRITT to LCX and POBA to OM1 with 20% residual, patent MERRITT-dRCA and MERRITT-mLAD  · He was seen at [facility] from 6/27/2023 to 6/28/2023. EKG was nonischemic and troponins were negative x 3. Lexiscan stress test was reported as negative for ischemia. Echocardiogram was unremarkable. He was seen by cardiology and begun on Imdur 30 mg daily and advised to continue ASA 81 mg daily, Plavix 75 mg daily, atorvastatin 80 mg daily, Toprol-XL 50 mg daily  · He was advised to come to the ED after he saw his primary care physician on 7/6/2023 as he only had mild improvement in his symptoms  · Cardiac cath (7/7/23) - Ost LAD lesion is 90% stenosed. Prior mid LAD, prox LCX, OM, and distal RCA stents are patent. · Transferred to hospitals for cardiac surgery and cardiology evaluation   · Ct ASA 81 mg daily, Atorvastatin 80 mg daily, Toprol XL 50 mg daily  · Plavix, Losartan held  · Evaluated by CT surgery, work-up for CABG ordered  · Monitor on telemetry    Fever  Assessment & Plan  · Noted to have a low-grade temperature of 100.1 on admission to [facility] after transferring to [facility].   In the morning of 7/8 fever of 101.7  · A few days ago patient had a right ear pain, right-sided sinus pain, later developed postnasal drip  · Pro-Ramez -negative x2  · Chest x-ray 7/6 negative  · COVID/flu/RSV negative  · Blood cultures taken  · Afrin significantly improved his symptoms  · We will order 5 days of doxycycline in light of possible sinus infection since he is going for surgery. He has penicillin allergy, not sure if ever had cephalosporins    Irritable bowel syndrome with constipation  Assessment & Plan  · History of IBS C, on Linzess  · Usually has a bowel movement every 4 days, on the day he has a bowel movements he has severe cramping and he usually spends the day in bed. He had 2 bowel movements today so far    Primary hypertension  Assessment & Plan  · Home medications: Toprol-XL 50 mg daily, losartan 50 mg daily  · Losartan held in the setting of increase in creatinine from baseline and possible CABG  · Monitor BP    Stage 3a chronic kidney disease [facility])  Assessment & Plan  Lab Results   Component Value Date    EGFR 50 07/08/2023    EGFR 51 07/07/2023    EGFR 42 07/06/2023    CREATININE 1.42 (H) 07/08/2023    CREATININE 1.38 (H) 07/07/2023    CREATININE 1.62 (H) 07/06/2023   · Baseline creatinine 1.1 to 1.3  · Creatinine elevated at 1.62 on 7/6/2023 and improved now  · Losartan held  · Monitor creatinine  · Avoid nephrotoxic medications and hypotension    Uncomplicated opioid dependence (HCC)  Assessment & Plan  · Continue oxycodone IR 5 mg 3 times daily  · Verified on PDMP    Mild persistent asthma without complication  Assessment & Plan  · No exacerbation   · Ct Breo    Restless legs  Assessment & Plan  · Continue Pramipexole 0.25 mg daily    HLD (hyperlipidemia)  Assessment & Plan  · Continue Atorvastatin 80 mg daily, Zetia 10 mg daily    Gastroesophageal reflux disease without esophagitis  Assessment & Plan  · Continue PPI twice daily           VTE Pharmacologic Prophylaxis: VTE Score: 3 Moderate Risk (Score 3-4) - Pharmacological DVT Prophylaxis Ordered: heparin. Patient Centered Rounds: I performed bedside rounds with nursing staff today.   Discussions with Specialists or Other Care Team Provider: Nursing    Education and Discussions with Family / Patient: Patient declined call to . Total Time Spent on Date of Encounter in care of patient: 35 minutes This time was spent on one or more of the following: performing physical exam; counseling and coordination of care; obtaining or reviewing history; documenting in the medical record; reviewing/ordering tests, medications or procedures; communicating with other healthcare professionals and discussing with patient's family/caregivers. Current Length of Stay: 1 day(s)  Current Patient Status: Inpatient   Certification Statement: The patient will continue to require additional inpatient hospital stay due to Multivessel disease, pending CABG  Discharge Plan: Anticipate discharge in >72 hrs to discharge location to be determined pending rehab evaluations. Code Status: Level 1 - Full Code    Subjective:   Patient was seen evaluated bedside. Complaining of postnasal drip, nasal decongestions helping    Objective:     Vitals:   Temp (24hrs), Av.9 °F (31.6 °C), Min:35.6 °F (2 °C), Max:101.7 °F (38.7 °C)    Temp:  [35.6 °F (2 °C)-101.7 °F (38.7 °C)] 98.5 °F (36.9 °C)  HR:  [59-70] 60  Resp:  [13-22] 13  BP: (100-150)/(39-77) 150/67  SpO2:  [92 %-97 %] 96 %  Body mass index is 24.85 kg/m². Input and Output Summary (last 24 hours): Intake/Output Summary (Last 24 hours) at 2023 1751  Last data filed at 2023 1724  Gross per 24 hour   Intake 1063 ml   Output --   Net 1063 ml       Physical Exam:   Physical Exam  Constitutional:       General: He is not in acute distress. HENT:      Head: Atraumatic. Ears:      Comments: Hard of hearing  Cardiovascular:      Rate and Rhythm: Normal rate and regular rhythm. Heart sounds: No murmur heard. No friction rub. No gallop. Pulmonary:      Effort: Pulmonary effort is normal. No respiratory distress. Breath sounds: Normal breath sounds. No wheezing. Abdominal:      General: Bowel sounds are normal. There is no distension. Palpations: Abdomen is soft. Tenderness: There is no abdominal tenderness. Musculoskeletal:         General: No swelling. Cervical back: Neck supple. Skin:     General: Skin is warm and dry. Neurological:      General: No focal deficit present. Mental Status: He is alert. Psychiatric:         Mood and Affect: Mood normal.          Additional Data:     Labs:  Results from last 7 days   Lab Units 07/08/23  0536   WBC Thousand/uL 10.11   HEMOGLOBIN g/dL 11.3*   HEMATOCRIT % 35.2*   PLATELETS Thousands/uL 209   NEUTROS PCT % 66   LYMPHS PCT % 22   MONOS PCT % 10   EOS PCT % 2     Results from last 7 days   Lab Units 07/08/23  0536 07/07/23  0541 07/1953   SODIUM mmol/L 142   < > 139   POTASSIUM mmol/L 3.8   < > 5.1   CHLORIDE mmol/L 113*   < > 109*   CO2 mmol/L 24   < > 24   BUN mg/dL 22   < > 28*   CREATININE mg/dL 1.42*   < > 1.62*   ANION GAP mmol/L 5   < > 6   CALCIUM mg/dL 8.1*   < > 8.5   ALBUMIN g/dL  --   --  4.0   TOTAL BILIRUBIN mg/dL  --   --  0.64   ALK PHOS U/L  --   --  59   ALT U/L  --   --  15   AST U/L  --   --  28   GLUCOSE RANDOM mg/dL 97   < > 76    < > = values in this interval not displayed. Results from last 7 days   Lab Units 07/08/23  0536 07/1955   PROCALCITONIN ng/ml <0.05 <0.05       Lines/Drains:  Invasive Devices     Peripheral Intravenous Line  Duration           Peripheral IV 07/06/23 Dorsal (posterior); Left Forearm 1 day                  Telemetry:  Telemetry Orders (From admission, onward)             24 Hour Telemetry Monitoring  Continuous x 24 Hours (Telem)        Expiring   Question:  Reason for 24 Hour Telemetry  Answer:  PCI/EP study (including pacer and ICD implementation), Cardiac surgery, MI, abnormal cardiac cath, and chest pain- rule out MI                 Telemetry Reviewed: Normal Sinus Rhythm  Indication for Continued Telemetry Use: Awaiting PCI/EP Study/CABG             Imaging: Reviewed radiology reports from this admission including: chest xray    Recent Cultures (last 7 days):   Results from last 7 days   Lab Units 07/08/23  0849   BLOOD CULTURE  Received in Microbiology Lab. Culture in Progress. Received in Microbiology Lab. Culture in Progress.        Last 24 Hours Medication List:   Current Facility-Administered Medications   Medication Dose Route Frequency Provider Last Rate   • acetaminophen  975 mg Oral Q6H PRN Titus Yanez MD     • albuterol  2 puff Inhalation Q4H PRN Titus Yanez MD     • aluminum-magnesium hydroxide-simethicone  30 mL Oral Q6H PRN Titus Yanez MD     • aspirin  81 mg Oral Daily Titus Yanez MD     • atorvastatin  80 mg Oral HS Titus Yanez MD     • chlorhexidine  15 mL Swish & Spit Q12H [facility] Dayanna Mitchell PA-C     • doxycycline hyclate  100 mg Oral Q12H Kimberly Mccormack MD     • ezetimibe  10 mg Oral HS Titus Yanez MD     • fluticasone  2 spray Nasal Daily PRN Titus Yanez MD     • fluticasone-vilanterol  1 puff Inhalation Daily Titus Yanez MD     • heparin (porcine)  5,000 Units Subcutaneous [facility] Titus Yanez MD     • hydrocortisone   Topical BID Titus Yanez MD     • hydrOXYzine HCL  50 mg Oral TID PRN Titus Yanez MD     • isosorbide mononitrate  30 mg Oral Daily Titus Yanez MD     • lubiprostone  8 mcg Oral BID Titus Ynaez MD     • metoprolol succinate  50 mg Oral Daily Titus Yanez MD     • mupirocin   Nasal Q12H [facility] Dayanna Mitchell PA-C     • nitroglycerin  0.4 mg Sublingual Q5 Min PRN Titus Yanez MD     • ondansetron  4 mg Intravenous Q6H PRN Titus Yanez MD     • oxyCODONE  5 mg Oral TID Titus Yanez MD     • oxymetazoline  2 spray Each Nare Q12H PRN Titus Yanez MD     • pantoprazole  40 mg Oral BID AC Titus Yanez MD     • pramipexole  0.25 mg Oral After Pavel Saavedra MD     • simethicone  80 mg Oral 4x Daily PRN Titus Yanez MD     • temazepam  7.5 mg Oral HS PRN Francois Santiago MD          Today, Patient Was Seen By: Nba Mcmullen MD    **Please Note: This note may have been constructed using a voice recognition system. **

## 2023-07-08 NOTE — RESPIRATORY THERAPY NOTE
RT Protocol Note  Sg Davis 69 y.o. male MRN: [id number]  Unit/Bed#: [facility] 419-01 Encounter: [id number]    Assessment    Principal Problem:    Coronary artery disease  Active Problems:    Gastroesophageal reflux disease without esophagitis    HLD (hyperlipidemia)    Restless legs    Mild persistent asthma without complication    Uncomplicated opioid dependence (HCC)    Low grade fever    Stage 3a chronic kidney disease (HCC)    Primary hypertension      Home Pulmonary Medications:    Home Devices/Therapy: Other (Comment) (albuterol mdi prn)    Past Medical History:   Diagnosis Date    Asthma     Degenerative disc disease, lumbar      Social History     Socioeconomic History    Marital status: Single     Spouse name: Not on file    Number of children: Not on file    Years of education: Not on file    Highest education level: Not on file   Occupational History    Not on file   Tobacco Use    Smoking status: Former     Types: Cigarettes     Passive exposure: Never    Smokeless tobacco: Never    Tobacco comments:     Quit 20 yrs ago   Vaping Use    Vaping Use: Never used   Substance and Sexual Activity    Alcohol use: Yes     Comment: Once every 2 weeks    Drug use: Never    Sexual activity: Not on file   Other Topics Concern    Not on file   Social History Narrative    Not on file     Social Determinants of Health     Financial Resource Strain: Not on file   Food Insecurity: Not on file   Transportation Needs: Not on file   Physical Activity: Not on file   Stress: Not on file   Social Connections: Not on file   Intimate Partner Violence: Not on file   Housing Stability: Not on file       Subjective         Objective    Physical Exam:   Assessment Type: Assess only  General Appearance: Alert  Respiratory Pattern: Normal  Chest Assessment: Chest expansion symmetrical  Bilateral Breath Sounds: Clear  Cough: None    Vitals:  Blood pressure (!) 114/41, pulse 65, temperature 99 °F (37.2 °C), resp.  rate 16, SpO2 94 %.          Imaging and other studies: I have personally reviewed pertinent reports. Plan       Airway Clearance Plan: Incentive Spirometer     Resp Comments: Pt presents with MI, history of copd. uses albuterol mdi prn at home. will continue home regimen.

## 2023-07-08 NOTE — CONSULTS
Consultation - Cardiology Team One  Sae Bella 69 y.o. male MRN: [id number]  Unit/Bed#: [facility] 679-71 Encounter: [id number]    Inpatient consult to Cardiology  Consult performed by: CLEVELAND Lyman  Consult ordered by: Maria Victoria Whelan MD          Physician Requesting Consult: Arnaldo Castillo MD  Reason for Consult / Principal Problem:  Coronary artery disease, 90% stenosis of Ostial LAD     Assessment:  Coronary artery disease  -Presented to [address] [place] [address] on 7/6/2023 with 4-week history of progressive worsening chest discomfort with exertion  -Trop negative X 3.    -EKG:   NSR rate 65 BPM  No ST elevations   -Extensive coronary artery disease   History of inferior MI 2021-placement of stent to distal RCA   Cath 1/3/2022 stents to mid LAD    Cath 6/2022 stent to left circumflex and OM1  -He was seen at [facility] from 6/27/2023-6/28/2023 for chest pain EKG showed nonischemia and troponins were negative x3. Magno Ion was reported negative for ischemia. Echocardiogram was unremarkable. He was started on Imdur and advised to continue aspirin/Plavix/atorvastatin and Toprol.  -He was seen in the emergency room after he saw his primary care physician on 7/6/2023 and the reported only mild improvement in chest pain with the addition of Imdur.  -Cardiac catheterization was performed on 7/7/2023 showed ostial LAD lesion 90% stenosis. Prior mid LAD, proximal left circumflex, OM and distal RCA a stents are patent.  -He was subsequently transferred to [address] [place] [address] for CT surgery consultation. Fever without leukocytosis  -Temperature of 101.7 noted on 7/8/2023 at 7 AM   -Obtain Blood Cultures   -He complained of a earache 2 days ago which progressed to sinus pressure and congestion.   -Earache and sinus pressure have resolved but now notes productive cough         Hypertension  -Controlled    Hyperlipidemia   -Cholesterol 114.  , HDL 27 and LDL 38      Mild persistent asthma  -Continue home medication of Breo    Chronic Lower back pain  -On Oxycodone IR     Opioid dependence  -Continue oxycodone IR 5 mg 3 times daily    CKD stage IIIa  -Baseline creatinine 1.1-1.3  -Creatinine today 1.42 and GFR 50 the morning.  -Avoid hypotension    Plan/Recommendations:  -CT surgery evaluation for possible CABG   -If not considered a candidate for CABg, will proceed with high risk PCI of Ostial LAD lesion.  -Continue goal-directed medical therapy with aspirin 81 mg daily Toprol XL 50 mg daily.  -Continue Zetia 10 mg and atorvastatin 80 mg daily  -Blood cultures ordered  -Continue to monitor white blood cell count      ______________________________________________________________________________________    CC: He complains of fever and cough. Aware that he has coronary artery disease with new blockage. History of Present Illness   HPI: Matt Morrison is a 69y.o. year old male presented to [address] [place] [address] on 7/6/2023 for evaluation of progressive exertional chest pain. He has an extensive coronary artery disease history. History of an inferior MI in December 2021 with placement of drug-eluting stents to distal right coronary artery with residual mid LAD disease. A cardiac catheterization was performed on 1/3/2022 for evaluation of angina type symptoms with placement of a drug-eluting stent to mid LAD. In June 2022 he underwent another cardiac cath for evaluation of angina type symptoms with placement of a drug-eluting stent to left circumflex artery n.p.o. BA to OM1 with 20% residual stenosis. At that time distal RCA stent and mid LAD stents were patent. He was recently evaluated at [facility] from 6/27/2023 to 6/28/2023 for complaints of exertional chest pain. At that time EKG was nonischemic and troponins were negative x3. [facility] nuclear med stress test was performed and reported as negative for ischemia. Echocardiogram was unremarkable. He was seen by cardiology and started on Imdur in addition to aspirin 81 mg daily and Plavix 75 mg daily as well as atorvastatin 80 mg daily and Toprol XL 50 mg daily. On 7/6/2023 he was evaluated by his primary care physician for only minimal improvement in exertional chest pain symptoms and was instructed to report to the emergency room. Troponins performed at [address] [place] [address] were negative x3. . EKG normal sinus rhythm with no specific ST elevations or ST changes. He subsequently underwent a cardiac catheterization at [facility] on 7/7/2023 via the right radial artery. Prior to cardiac catheterization he was loaded with Plavix 600 mg p.o. Findings: Ostial LAD lesion is 90% stenosed. Prior mid LAD, proximal left circumflex, OM and distal RCA stents are patent. And LVEDP was reported normal with no gradient in LV aVL. Pullback. He was subsequently transferred to [address] [place] [address] for cardiothoracic surgery consultation/CABG evaluation. If patient is not deemed to be a surgical candidate we will proceed with high risk PCI of ostial LAD lesion. EKG reviewed personally:    Normal sinus rhythm rate 65 bpm no specific ST changes noted when compared to previous EKGs. Telemetry reviewed personally: Normal sinus rhythm, 60s. No ectopy noted on the monitor. Review of Systems   Constitutional: Positive for fever. Negative for chills and weight loss. HENT: Negative for congestion and ear pain. Eyes: Negative. Cardiovascular: Negative. Negative for chest pain, leg swelling, orthopnea, palpitations, paroxysmal nocturnal dyspnea and syncope. Respiratory: Positive for cough and sputum production. Negative for shortness of breath and wheezing. Endocrine: Negative. Hematologic/Lymphatic: Negative. Skin: Negative. Musculoskeletal: Negative. Gastrointestinal: Negative.   Negative for abdominal pain, nausea and vomiting. Genitourinary: Negative. Neurological: Negative. Negative for focal weakness and light-headedness. Psychiatric/Behavioral: Negative. Negative for depression. Allergic/Immunologic: Negative.       Historical Information   Past Medical History:   Diagnosis Date   • Asthma    • Degenerative disc disease, lumbar      Past Surgical History:   Procedure Laterality Date   • HERNIA REPAIR     • TOTAL HIP ARTHROPLASTY Bilateral      Social History     Substance and Sexual Activity   Alcohol Use Yes    Comment: Once every 2 weeks     Social History     Substance and Sexual Activity   Drug Use Never     Social History     Tobacco Use   Smoking Status Former   • Types: Cigarettes   • Passive exposure: Never   Smokeless Tobacco Never   Tobacco Comments    Quit 20 yrs ago     Family History: No pertinent family history     Meds/Allergies   current meds:   Current Facility-Administered Medications   Medication Dose Route Frequency   • acetaminophen (TYLENOL) tablet 975 mg  975 mg Oral Q6H PRN   • albuterol (PROVENTIL HFA,VENTOLIN HFA) inhaler 2 puff  2 puff Inhalation Q4H PRN   • aluminum-magnesium hydroxide-simethicone (MAALOX) oral suspension 30 mL  30 mL Oral Q6H PRN   • aspirin chewable tablet 81 mg  81 mg Oral Daily   • atorvastatin (LIPITOR) tablet 80 mg  80 mg Oral HS   • ezetimibe (ZETIA) tablet 10 mg  10 mg Oral HS   • fluticasone (FLONASE) 50 mcg/act nasal spray 2 spray  2 spray Nasal Daily PRN   • fluticasone-vilanterol 200-25 mcg/actuation 1 puff  1 puff Inhalation Daily   • heparin (porcine) subcutaneous injection 5,000 Units  5,000 Units Subcutaneous Q8H [address]   • hydrocortisone 1 % cream   Topical BID   • hydrOXYzine HCL (ATARAX) tablet 50 mg  50 mg Oral TID PRN   • isosorbide mononitrate (IMDUR) 24 hr tablet 30 mg  30 mg Oral Daily   • lubiprostone (AMITIZA) capsule 8 mcg  8 mcg Oral BID   • metoprolol succinate (TOPROL-XL) 24 hr tablet 50 mg  50 mg Oral Daily   • nitroglycerin (NITROSTAT) SL tablet 0.4 mg  0.4 mg Sublingual Q5 Min PRN   • ondansetron (ZOFRAN) injection 4 mg  4 mg Intravenous Q6H PRN   • oxyCODONE (ROXICODONE) IR tablet 5 mg  5 mg Oral TID   • oxymetazoline (AFRIN) 0.05 % nasal spray 2 spray  2 spray Each Nare Q12H PRN   • pantoprazole (PROTONIX) EC tablet 40 mg  40 mg Oral BID AC   • pramipexole (MIRAPEX) tablet 0.25 mg  0.25 mg Oral After Dinner   • simethicone (MYLICON) chewable tablet 80 mg  80 mg Oral 4x Daily PRN   • temazepam (RESTORIL) capsule 7.5 mg  7.5 mg Oral HS PRN          Allergies   Allergen Reactions   • Shellfish Allergy - Food Allergy Hives     Per patient, states "I can't clean raw shellfish, but I can eat SHRIMP"    • Shellfish-Derived Products - Food Allergy Hives     Per patient, states "I can't clean raw shellfish, but I can eat SHRIMP"    • Penicillins Hives   • Pollen Extract Hives     Per patient, burning itchy eyes, stuffy nose, asthma symptoms. Objective   Vitals: Blood pressure 121/77, pulse 69, temperature (!) 101.7 °F (38.7 °C), resp. rate 18, SpO2 96 %. ,     There is no height or weight on file to calculate BMI.,     Systolic (28FYR), UCB:083 , Min:100 , VVH:452     Diastolic (35WSD), SNK:07, Min:39, Max:77    Wt Readings from Last 3 Encounters:   07/07/23 83.1 kg (183 lb 4.8 oz)   05/13/21 89.9 kg (198 lb 3.1 oz)      Lab Results   Component Value Date    CREATININE 1.42 (H) 07/08/2023    CREATININE 1.38 (H) 07/07/2023    CREATININE 1.62 (H) 07/06/2023             Intake/Output Summary (Last 24 hours) at 7/8/2023 0742  Last data filed at 7/8/2023 0600  Gross per 24 hour   Intake 715 ml   Output --   Net 715 ml     Weight (last 2 days)     None        Invasive Devices     Peripheral Intravenous Line  Duration           Peripheral IV 07/06/23 Dorsal (posterior); Left Forearm 1 day                  Physical Exam  Constitutional:       General: He is not in acute distress. Appearance: Normal appearance.  He is not ill-appearing. HENT:      Head: Normocephalic. Nose: Nose normal.      Mouth/Throat:      Mouth: Mucous membranes are moist.   Eyes:      Conjunctiva/sclera: Conjunctivae normal.   Neck:      Vascular: No carotid bruit. Cardiovascular:      Rate and Rhythm: Normal rate and regular rhythm. Pulses: Normal pulses. Heart sounds: Normal heart sounds. No murmur heard. No friction rub. No gallop. Pulmonary:      Effort: Pulmonary effort is normal.      Breath sounds: No wheezing, rhonchi or rales. Abdominal:      General: Bowel sounds are normal. There is no distension. Palpations: Abdomen is soft. Musculoskeletal:         General: Normal range of motion. Cervical back: Neck supple. Right lower leg: No edema. Left lower leg: No edema. Skin:     General: Skin is warm. Capillary Refill: Capillary refill takes less than 2 seconds. Neurological:      Mental Status: He is alert and oriented to person, place, and time.            LABORATORY RESULTS:      CBC with diff:   Results from last 7 days   Lab Units 07/08/23 0536 07/07/23 0541 07/1955   WBC Thousand/uL 10.11 10.96* 11.08*   HEMOGLOBIN g/dL 11.3* 11.3* 10.4*   HEMATOCRIT % 35.2* 35.2* 32.7*   MCV fL 91 92 92   PLATELETS Thousands/uL 209 208 202   RBC Million/uL 3.85* 3.84* 3.56*   MCH pg 29.4 29.4 29.2   MCHC g/dL 32.1 32.1 31.8   RDW % 13.4 13.6 13.4   MPV fL 10.3 10.5 10.3   NRBC AUTO /100 WBCs 0 0 0       CMP:  Results from last 7 days   Lab Units 07/08/23 0536 07/07/23  0541 07/1955   POTASSIUM mmol/L 3.8 3.6 5.1   CHLORIDE mmol/L 113* 106 109*   CO2 mmol/L 24 27 24   BUN mg/dL 22 25 28*   CREATININE mg/dL 1.42* 1.38* 1.62*   CALCIUM mg/dL 8.1* 8.5 8.5   AST U/L  --   --  28   ALT U/L  --   --  15   ALK PHOS U/L  --   --  59   EGFR ml/min/1.73sq m 50 51 42       BMP:  Results from last 7 days   Lab Units 07/08/23  0536 07/07/23  0541 07/1955   POTASSIUM mmol/L 3.8 3.6 5.1   CHLORIDE mmol/L 113* 106 109*   CO2 mmol/L 24 27 24   BUN mg/dL 22 25 28*   CREATININE mg/dL 1.42* 1.38* 1.62*   CALCIUM mg/dL 8.1* 8.5 8.5          No results found for: "NTBNP"         Results from last 7 days   Lab Units 07/08/23  0536   MAGNESIUM mg/dL 1.9                         Lipid Profile:   No results found for: "CHOL"  No results found for: "HDL"  No results found for: "LDLCALC"  No results found for: "TRIG"      Cardiac testing:   No results found for this or any previous visit. No results found for this or any previous visit. No valid procedures specified. No results found for this or any previous visit. Imaging: I have personally reviewed pertinent reports. Cardiac catheterization    Result Date: 7/7/2023  Narrative: •  Ost LAD lesion is 90% stenosed. Prior mid LAD, prox LCX, OM, and distal RCA stents are patent. •  LVEDP is normal without gradient on LV-AO pullback     XR chest 1 view portable    Result Date: 7/7/2023  Narrative: CHEST INDICATION:   dyspnea. COMPARISON: CXR and CTA neck 5/13/2021. EXAM PERFORMED/VIEWS:  XR CHEST PORTABLE. FINDINGS: Heart size exaggerated by the portable projection. Lungs clear. No effusion or pneumothorax. Upper abdomen normal. Bones normal for age. Impression: No acute cardiopulmonary disease. Workstation performed: [id number]     ECHO 2D COMPLETE    Result Date: 6/28/2023  Narrative: This result has an attachment that is not available. •  Left Ventricle: Left ventricle is normal in size. Systolic function is normal with an ejection fraction of 55-60%. Wall motion is within normal limits. There is grade I (mild) diastolic dysfunction and normal left atrial pressure. •  Left Atrium: Left atrium cavity size is normal. •  Right Ventricle: Right ventricle cavity is normal. Systolic function is normal. •  Mitral Valve: There is trace regurgitation. •  Tricuspid Valve: There is trace regurgitation. Left Ventricle Left ventricle is normal in size.  Wall thickness is normal. Systolic function is normal with an ejection fraction of 55-60%. Wall motion is within normal limits. There is grade I (mild) diastolic dysfunction and normal left atrial pressure. Right Ventricle Right ventricle cavity is normal. Systolic function is normal. Left Atrium Left atrium cavity size is normal. Right Atrium Right atrium cavity is normal. IVC/SVC The inferior vena cava demonstrates a diameter of <=21 mm and collapses >50%; therefore, the right atrial pressure is estimated at 0-5 mmHg. Mitral Valve Mitral valve structure is normal. There is trace regurgitation. There is no evidence of mitral valve stenosis. Tricuspid Valve Tricuspid valve structure is normal. There is trace regurgitation. There is no evidence of tricuspid valve stenosis. Aortic Valve The aortic valve is trileaflet. There is no regurgitation or stenosis. Pulmonic Valve Pulmonic valve structure is normal. There is no regurgitation or stenosis. Ascending Aorta The aorta was not well visualized. Pericardium There is a small pericardial effusion posterior to the heart. Study Details A complete 2D echocardiogram was performed. Color flow, Pulse Wave and Continuous Wave Doppler was performed and analyzed. The study had technical difficulties. The study was difficult due to patient's body habitus. NM STRESS TEST - NUCLEAR - LEXISCAN    Result Date: 6/28/2023  Narrative: History: Chest pain/anginal equiv, prior revascularization Radiopharmaceutical and technique: Tomographic gated myocardial perfusion scan was obtained following intravenous administration of 9.3 mCi of Tc-99m myoview at rest and 31.4 mCi of Tc-99m myoview during regadenoson (Lexiscan) infusion. 0.4 mg of regadenoson (Lexiscan) was infused intravenously via protocol as pharmacologic stress agent. . Findings: No prior study is available for comparison.  There is normal radiotracer labeling of the left ventricle myocardium except for a tiny, mild intensity defect in the distal inferoapical wall on the stress images, favored to represent artifact from adjacent bowel activity There is no regional wall motion abnormality. There is no evidence of transient ischemic dilatation. The calculated left ventricular ejection fraction is 62%. Impression: IMPRESSION: 1. No definite evidence of infarct or ischemia 2. Normal left ventricular systolic function with ejection fraction of 62% Workstation:[id number]    XR chest pa & lateral    Result Date: 6/27/2023  Narrative: History: Chest pain . Technique: PA and lateral views of the chest. Comparisons: Comparison is made to prior radiographs performed on 3/11/2023. Findings: The cardiac silhouette is not enlarged. The mediastinum is not widened. No pneumothorax is visualized. No pneumomediastinum is visualized. No pleural effusion is visualized. No evidence of pneumonia. No acute fracture or dislocation is visualized. Impression: Impression: No acute pathology visualized. Workstation:[id number]          Counseling / Coordination of Care  Total floor / unit time spent today 45 minutes. Greater than 50% of total time was spent with the patient and / or family counseling and / or coordination of care. A description of the counseling / coordination of care: Review of history, current assessment, development of a plan. Code Status: Level 1 - Full Code    ** Please Note: Dragon 360 Dictation voice to text software may have been used in the creation of this document.  **

## 2023-07-08 NOTE — ASSESSMENT & PLAN NOTE
· Noted to have a low-grade temperature of 100.1 on admission to [facility] and today  · Pro-Ramez -negative x1  · Chest x-ray -negative  · No cough, dysuria, nausea/vomiting/diarrhea, skin lesions  · Mild leukocytosis - 11.08 -- 10.96  · Recheck Pro-Ramez in a.m  · Possible atelectasis  · Incentive spirometry  · Monitor temperature, WBC

## 2023-07-08 NOTE — ASSESSMENT & PLAN NOTE
Lab Results   Component Value Date    EGFR 50 07/08/2023    EGFR 51 07/07/2023    EGFR 42 07/06/2023    CREATININE 1.42 (H) 07/08/2023    CREATININE 1.38 (H) 07/07/2023    CREATININE 1.62 (H) 07/06/2023   · Baseline creatinine 1.1 to 1.3  · Creatinine elevated at 1.62 on 7/6/2023 and improved now  · Losartan held  · Monitor creatinine  · Avoid nephrotoxic medications and hypotension

## 2023-07-08 NOTE — CONSULTS
Consultation - Cardiothoracic Surgery   Jermaine Gandara 69 y.o. male MRN: [id number]  Unit/Bed#: [facility] 419-01 Encounter: [id number]      History of Present Illness   Physician Requesting Consult: Justin Hughes MD  Reason for Consult / Principal Problem: MV CAD  HPI: Jermaine Gandara is a 69y.o. year old male who presents with presents for evaluation of MV CAD. Of note, recent admission to [facility] 6/27-6/28 due to CP. Troponin WNL, EKG w/o acute changes, TTE & stress test per discharge summary WNL therefore medications adjusted & discharge w/ o/p f/u. States few days PTA to [facility] had right ear pain that progressed to nasal pain, saw ENT who referred him to ED due to CP complaints. Admits to exertional CP x3 weeks overall, admits that this intermittently occurs over the past few years waxing & waning exertional CP w/ (-) w/us in office. Presented to [facility] ED 7/6 due to exertional CPs, troponin (-), EKG w/o changes. Due to recent hx & sx admitted for cardiac cath showing MV CAD. Transferred for B for cardiac sx consultation. Also of note, low grade temp 100.1 on admission 7/6, now 101.7 7/8 has BC pending this AM & procal/WBC AM WNL this  W/o abx started yet. (+) Plavix 600mg 7/7. Upon examination today, Mr. Emely Saavedra reports he is feeling well. Admits to no cardiac complaints since admission, went on walk this AM w/o CP. Denies CP, palpitations, SOB, LYONS, LE edema b/l, orthopnea, PND, numbness/tinlging/paresthesias in UE or LE bilaterally, HA, lightheadeness, dizziness, presyncopal symptoms, hx syncopal events, N/V/D, hemoptysis, hematemesis, hematochezia, melena. Denies hx stroke, hx cancer w/ chest wall radiation, hx blood loss anemia, hx varicose veins or vein stripping.     PMH includes CAD (s/p MERRITT to LAD/circ/OM/RCA), h/o MI, HTN, HL, hyperTG, current tobacco use (cigars), former tobacco use (cigarettes), IBS-C (recent prescription for Linzess given/started 6/2023), asthma, chronic LBP (Oxy 5mg TID), GERD, RLS, allergic rhinitis, [place]. PSH includes cardiac cath w/ multiple MERRITT placements in past, skin lesion excisions, b/l THR, hernia repair (right IHR, unsure mesh, child). Denies FH of CAD/MI, HTN, HL, valvular disease, DM, aortic aneurysms, or SCD. Patient is as artist, painting. Lives in a apartment home by self, no one locally for support system, stairs to get into apartment. Does not use an assist device for ambulation. Drives. (+) current/forner tobacco use (cigars currently --> twice a month, ,former cigarettes --> 1ppd x15 years, quit 30 years ago). Denies current or prior use of ETOH or drug use. Allergies include shellfish with rxn hives, PCN w/ rxn hives, pollen w/ rxn itchy eyes/stuffy nose/asthma sx. Cardiac pertinent medications include: Aspirin 81mg, Lipitor 80mg, Zetia 10mg, Breo, Imdur 30mg, Toprol 50mg. Other medications can be reviewed in the patient chart. Patient sees Dr. Ting [facility]) as his primary care physician, their prior visit documentation was reviewed at this visit. Patient sees Dr. Sedrick [facility], [address] [place] [address] [address] 6/2022) as her cardiologist, their proior visit documentation was reviewed at this visit. Inpatient consult to Cardiothoracic Surgery  Consult performed by: Mac Delatorre PA-C  Consult ordered by: Fausto Kussmaul, MD          Review of Systems   Constitutional: Negative. Negative for activity change, diaphoresis, fatigue and unexpected weight change. Respiratory: Negative. Negative for chest tightness, shortness of breath and wheezing. Cardiovascular: Negative. Negative for chest pain, palpitations and leg swelling. Gastrointestinal: Negative. Negative for blood in stool, constipation, diarrhea, nausea and vomiting. Genitourinary: Negative. Musculoskeletal: Negative. Negative for arthralgias, back pain, gait problem and myalgias. Skin: Negative. Neurological: Negative. Negative for dizziness, syncope, weakness, light-headedness, numbness and headaches. Hematological: Negative. Does not bruise/bleed easily. All other systems reviewed and are negative.      Historical Information   Past Medical History:   Diagnosis Date   • Allergic rhinitis    • Asthma    • CAD (coronary artery disease)     s/p MERRITT to LAD/circ/OM/RCA   • Chronic lower back pain     Oxy 5mg TID   • Current tobacco use     cigars   • Former tobacco use     cigarettes   • GERD (gastroesophageal reflux disease)    • Hard of hearing    • History of myocardial infarction    • Hyperlipidemia    • Hypertension    • Hypertriglyceridemia    • IBS (irritable bowel syndrome)     constipation   • RLS (restless legs syndrome)      Past Surgical History:   Procedure Laterality Date   • CARDIAC CATHETERIZATION      diagnostic & interventional, s/p MERRITT to LAD/circ/OM/RCA   • HERNIA REPAIR     • SKIN LESION EXCISION     • TOTAL HIP ARTHROPLASTY Bilateral      Social History     Substance and Sexual Activity   Alcohol Use Yes    Comment: social     Social History     Substance and Sexual Activity   Drug Use Never     Social History     Tobacco Use   Smoking Status Former   • Types: Cigarettes   • Passive exposure: Never   Smokeless Tobacco Never   Tobacco Comments    Cigarettes --> 1ppd x15 years, quit 30 yrs ago    Cigars --> twice a month     Family History: as above    Meds/Allergies   all current active meds have been reviewed, current meds:   Current Facility-Administered Medications   Medication Dose Route Frequency   • acetaminophen (TYLENOL) tablet 975 mg  975 mg Oral Q6H PRN   • albuterol (PROVENTIL HFA,VENTOLIN HFA) inhaler 2 puff  2 puff Inhalation Q4H PRN   • aluminum-magnesium hydroxide-simethicone (MAALOX) oral suspension 30 mL  30 mL Oral Q6H PRN   • aspirin chewable tablet 81 mg  81 mg Oral Daily   • atorvastatin (LIPITOR) tablet 80 mg  80 mg Oral HS   • ezetimibe (ZETIA) tablet 10 mg  10 mg Oral HS   • fluticasone (FLONASE) 50 mcg/act nasal spray 2 spray  2 spray Nasal Daily PRN   • fluticasone-vilanterol 200-25 mcg/actuation 1 puff  1 puff Inhalation Daily   • heparin (porcine) subcutaneous injection 5,000 Units  5,000 Units Subcutaneous Q8H [address]   • hydrocortisone 1 % cream   Topical BID   • hydrOXYzine HCL (ATARAX) tablet 50 mg  50 mg Oral TID PRN   • isosorbide mononitrate (IMDUR) 24 hr tablet 30 mg  30 mg Oral Daily   • lubiprostone (AMITIZA) capsule 8 mcg  8 mcg Oral BID   • metoprolol succinate (TOPROL-XL) 24 hr tablet 50 mg  50 mg Oral Daily   • nitroglycerin (NITROSTAT) SL tablet 0.4 mg  0.4 mg Sublingual Q5 Min PRN   • ondansetron (ZOFRAN) injection 4 mg  4 mg Intravenous Q6H PRN   • oxyCODONE (ROXICODONE) IR tablet 5 mg  5 mg Oral TID   • oxymetazoline (AFRIN) 0.05 % nasal spray 2 spray  2 spray Each Nare Q12H PRN   • pantoprazole (PROTONIX) EC tablet 40 mg  40 mg Oral BID AC   • pramipexole (MIRAPEX) tablet 0.25 mg  0.25 mg Oral After Dinner   • simethicone (MYLICON) chewable tablet 80 mg  80 mg Oral 4x Daily PRN   • temazepam (RESTORIL) capsule 7.5 mg  7.5 mg Oral HS PRN    and PTA meds:   Prior to Admission Medications   Prescriptions Last Dose Informant Patient Reported? Taking?    Albuterol Sulfate (VENTOLIN HFA IN)   Yes No   Sig: Inhale   Patient not taking: Reported on 2023   Fluticasone-Salmeterol (Advair Diskus) 250-50 mcg/dose inhaler   Yes No   Sig: Inhale 1 puff 2 (two) times a day   aspirin 81 mg chewable tablet   Yes No   Sig: Chew 81 mg daily   atorvastatin (LIPITOR) 80 mg tablet   Yes No   Sig: Take 80 mg by mouth daily at bedtime   fluticasone (FLONASE) 50 mcg/act nasal spray   Yes No   Si sprays into each nostril daily as needed for allergies   hydrOXYzine HCL (ATARAX) 50 mg tablet   Yes No   Sig: Take 50 mg by mouth 3 (three) times a day as needed for itching or anxiety   isosorbide mononitrate (IMDUR) 30 mg 24 hr tablet   Yes No   Sig: Take 30 mg by mouth daily   linaCLOtide (Linzess) 72 MCG CAPS   Yes No   Sig: Take 72 mcg by mouth daily   losartan (COZAAR) 50 mg tablet   Yes No   Sig: Take 50 mg by mouth daily   metoprolol succinate (TOPROL-XL) 50 mg 24 hr tablet   Yes No   Sig: Take 50 mg by mouth daily   oxyCODONE (OXY-IR) 5 MG capsule   Yes No   Sig: Take 5 mg by mouth 3 (three) times a day   pantoprazole (PROTONIX) 40 mg tablet   Yes No   Sig: Take 40 mg by mouth 2 (two) times a day   pramipexole (MIRAPEX) 0.125 mg tablet   Yes No   Sig: Take 0.25 mg by mouth daily at bedtime      Facility-Administered Medications: None     Allergies   Allergen Reactions   • Shellfish Allergy - Food Allergy Hives     Per patient, states "I can't clean raw shellfish, but I can eat SHRIMP"    • Shellfish-Derived Products - Food Allergy Hives     Per patient, states "I can't clean raw shellfish, but I can eat SHRIMP"    • Penicillins Hives   • Pollen Extract Hives     Per patient, burning itchy eyes, stuffy nose, asthma symptoms. Objective   Vitals: Blood pressure 139/55, pulse 68, temperature (!) 101.7 °F (38.7 °C), resp. rate 18, SpO2 96 %. Invasive Devices     Peripheral Intravenous Line  Duration           Peripheral IV 07/06/23 Dorsal (posterior); Left Forearm 1 day                Physical Exam  Constitutional:       General: He is not in acute distress. Appearance: Normal appearance. He is well-developed. He is not ill-appearing or toxic-appearing. Comments: Laying in bed in NAD   HENT:      Head: Normocephalic and atraumatic. Mouth/Throat:      Dentition: Normal dentition. Does not have dentures. Pharynx: Uvula midline. Neck:      Vascular: No carotid bruit or JVD. Trachea: Trachea normal.   Cardiovascular:      Rate and Rhythm: Normal rate and regular rhythm. Comments: No heaves/lifts on palpation  Pulmonary:      Effort: Pulmonary effort is normal. No accessory muscle usage or respiratory distress. Comments: On RA  Abdominal:      General: Bowel sounds are normal. There is no distension.       Palpations: Abdomen is not rigid. There is no mass. Tenderness: There is no abdominal tenderness. There is no guarding or rebound. Musculoskeletal:      Cervical back: Normal range of motion and neck supple. Skin:     General: Skin is warm and dry. Findings: No bruising, petechiae or rash. Nails: There is no clubbing. Comments: Trace edema b/l LE, no varicosities appreciated to b/l LE   Neurological:      Mental Status: He is alert and oriented to person, place, and time. Cranial Nerves: No cranial nerve deficit. Sensory: No sensory deficit. Comments: No focal deficit appreciated         Lab Results:   Hb 11.3, plt 209, Cr 7/8 1.42, procal 7/8 WNL, troponin (-), , LFTs 7/6 WNL, lipid panel 3/11 (outside facility) , A1C 3/11 (outside facility) 5.7  BC x2 7/8: pending  Results from last 7 days   Lab Units 07/08/23  0536 07/07/23  0541 07/1955   WBC Thousand/uL 10.11 10.96* 11.08*   HEMOGLOBIN g/dL 11.3* 11.3* 10.4*   HEMATOCRIT % 35.2* 35.2* 32.7*   PLATELETS Thousands/uL 209 208 202     Results from last 7 days   Lab Units 07/08/23  0536 07/07/23  0541 07/1955   POTASSIUM mmol/L 3.8 3.6 5.1   CHLORIDE mmol/L 113* 106 109*   CO2 mmol/L 24 27 24   BUN mg/dL 22 25 28*   CREATININE mg/dL 1.42* 1.38* 1.62*   CALCIUM mg/dL 8.1* 8.5 8.5         Lab Results   Component Value Date    HGBA1C 5.7 (H) 03/11/2023     Lab Results   Component Value Date    TROPONINI <0.02 05/13/2021       Imaging Studies: I have personally reviewed pertinent reports. and I have personally reviewed pertinent films in PACS  EKG, Pathology, and Other Studies: I have personally reviewed pertinent reports.    and I have personally reviewed pertinent films in PACS    Cardiac cath 7/7: 90% ostial LAD, patent mid LAD/prox circ/OM/distal RCA stents    TTE 6/28 (External Image Report): EF 55-60%, grade 1 DD, trace MR, trace TR    CXR 7/6: no acute disease    EKG 7/6: done    Assessment:  Patient Active Problem List Diagnosis Date Noted   • Primary hypertension 07/08/2023   • Stage 3a chronic kidney disease ([address]) 07/07/2023   • Angina pectoris ([address]) 07/06/2023   • Coronary artery disease 72/59/4516   • Uncomplicated opioid dependence ([address]) 07/06/2023   • [place] (hard of hearing) 07/06/2023   • Low grade fever 07/06/2023   • Chest pain 05/13/2021   • Gastroesophageal reflux disease without esophagitis 05/13/2021   • HLD (hyperlipidemia) 05/13/2021   • Restless legs 05/13/2021   • Mild persistent asthma without complication 90/14/4721   • Abnormal CT scan of head 05/13/2021       Plan:    Risks and benefits of coronary artery bypass grafting were discussed in detail today with the patient. They understand and wish to proceed with further workup and ultimately surgical intervention. We have ordered routine preoperative laboratory and vascular studies. Pending the results of these tests, they will be scheduled for surgery with Dr. Jaja Hernandez M.D. .    The patient was comfortable with our recommendations, and their questions were answered to their satisfaction. We will continue to evaluate the patient daily with further recommendations as work up is completed. Thank you for allowing us to participate in the care of this patient.        Mac Delatorre PA-C  10:22 AM  07/08/23

## 2023-07-08 NOTE — PROGRESS NOTES
Patient transported to [facility] via ambulance. All belongings taken with patient. Report called to receiving nurse.

## 2023-07-08 NOTE — ASSESSMENT & PLAN NOTE
· Noted to have a low-grade temperature of 100.1 on admission to [facility] after transferring to [facility]. In the morning of 7/8 fever of 101.7  · A few days ago patient had a right ear pain, right-sided sinus pain, later developed postnasal drip  · Pro-Ramez -negative x2  · Chest x-ray 7/6 negative  · COVID/flu/RSV negative  · Blood cultures taken  · Afrin significantly improved his symptoms  · We will order 5 days of doxycycline in light of possible sinus infection since he is going for surgery.   He has penicillin allergy, not sure if ever had cephalosporins

## 2023-07-08 NOTE — NURSING NOTE
While cleaning patient's room after discharge, housekeeping moved the bed and found a pair of flip flops that had not been sent with the patient. A patient belonging sticker was placed on them and this nurse sent a tiger text to the nurse who received the patient at [facility] so that she may inform the patient of same.

## 2023-07-08 NOTE — ASSESSMENT & PLAN NOTE
· [facility] with a 4-week history of progressively worsening chest discomfort with exertion  · H/o inferior MI in December 2021 - s/p MERRITT to dRCA with residual mLAD disease; had angina and underwent cath on 1/3/2022 with MERRITT to mLAD; stable angina and underwent cath in June 2022 s/p MERRITT to LCX and POBA to OM1 with 20% residual, patent MERRITT-dRCA and MERRITT-mLAD  · He was seen at [facility] from 6/27/2023 to 6/28/2023. EKG was nonischemic and troponins were negative x 3. Lexiscan stress test was reported as negative for ischemia. Echocardiogram was unremarkable. He was seen by cardiology and begun on Imdur 30 mg daily and advised to continue ASA 81 mg daily, Plavix 75 mg daily, atorvastatin 80 mg daily, Toprol-XL 50 mg daily  · He was advised to come to the ED after he saw his primary care physician on 7/6/2023 as he only had mild improvement in his symptoms  · Cardiac cath (7/7/23) - Ost LAD lesion is 90% stenosed. Prior mid LAD, prox LCX, OM, and distal RCA stents are patent.   · Transferred to [facility] for cardiac surgery and cardiology evaluation   · Ct ASA 81 mg daily, Atorvastatin 80 mg daily, Toprol XL 50 mg daily  · Plavix, Losartan held  · Evaluated by CT surgery, work-up for CABG ordered  · Monitor on telemetry

## 2023-07-08 NOTE — CASE MANAGEMENT
Case Management Assessment & Discharge Planning Note    Patient name Rowena Lefort  Location [facility] 419/[facility] 517-58 MRN [id number]  : 1955 Date 2023       Current Admission Date: 2023  Current Admission Diagnosis:Coronary artery disease   Patient Active Problem List    Diagnosis Date Noted   • Primary hypertension 2023   • Stage 3a chronic kidney disease ([address]) 2023   • Angina pectoris ([address]) 2023   • Coronary artery disease    • Uncomplicated opioid dependence ([address]) 2023   • [place] (hard of hearing) 2023   • Low grade fever 2023   • Chest pain 2021   • Gastroesophageal reflux disease without esophagitis 2021   • HLD (hyperlipidemia) 2021   • Restless legs 2021   • Mild persistent asthma without complication    • Abnormal CT scan of head 2021      LOS (days): 1  Geometric Mean LOS (GMLOS) (days):   Days to GMLOS:     OBJECTIVE:    Risk of Unplanned Readmission Score: 21.37      Current admission status: Inpatient    Preferred Pharmacy: [facility]: [place]   Primary Care Provider: Jamel Lindo DO    Primary Insurance: MEDICARE  Secondary Insurance: [facility]:  [facility] Proxies    There are no active Health Care Proxies on file. Advance Directives  Does patient have a Health Care POA?: No  Was patient offered paperwork?: Yes (pt declined)  Does patient have Advance Directives?: No  Was patient offered paperwork?: Yes (pt declined)  Primary Contact: pt has no emergency contact listed and has identified no family or friend's to add as emergency contact.       Readmission Root Cause  30 Day Readmission: No    Patient Information  Admitted from[de-identified] Other (comment) (resides at [facility] (plan to return on dc))  Mental Status: Alert  During Assessment patient was accompanied by: Not accompanied during assessment  Assessment information provided by[de-identified] Patient  Primary Caregiver: Self  Support Systems: Self  What city do you live in?: [facility] entry access options. Select all that apply.: No steps to enter home  Type of Current Residence: Other (Comment) (Shelter)  Living Arrangements: Lives Alone (resides at [employer], plan to return on d.c,  pt states he is currently on housing waitlist for permanent housing.)    Activities of Daily Living Prior to Admission  Functional Status: Independent  Completes ADLs independently?: Yes  Ambulates independently?: Yes  Does patient use assisted devices?: No  Does patient currently own DME?: No  Does patient have a history of Outpatient Therapy (PT/OT)?: No  Does the patient have a history of Short-Term Rehab?: No  Does patient have a history of HHC?: No  Does patient currently have [address]?: No    Patient Information Continued  Does patient have prescription coverage?: Yes      Means of Transportation  Means of Transport to [facility][de-identified] Public Transportation - Lyft        DISCHARGE DETAILS:    Other Referral/Resources/Interventions Provided:  Referral Comments: After care needs pending, CM team will continue to follow for discharge planning needs and or concerns.

## 2023-07-08 NOTE — ASSESSMENT & PLAN NOTE
Lab Results   Component Value Date    EGFR 51 07/07/2023    EGFR 42 07/06/2023    EGFR 58 05/14/2021    CREATININE 1.38 (H) 07/07/2023    CREATININE 1.62 (H) 07/06/2023    CREATININE 1.28 05/14/2021   · Baseline creatinine 1.1 to 1.3  · Creatinine elevated at 1.62 on 7/6/2023 and improved to 1.38 today  · Losartan held  · Monitor creatinine  · Avoid nephrotoxic medications and hypotension

## 2023-07-08 NOTE — ASSESSMENT & PLAN NOTE
· History of IBS C, on Linzess  · Usually has a bowel movement every 4 days, on the day he has a bowel movements he has severe cramping and he usually spends the day in bed.   He had 2 bowel movements today so far

## 2023-07-08 NOTE — ASSESSMENT & PLAN NOTE
· [facility] with a 4-week history of progressively worsening chest discomfort with exertion  · H/o inferior MI in December 2021 - s/p MERRITT to dRCA with residual mLAD disease; had angina and underwent cath on 1/3/2022 with MERRITT to mLAD; stable angina and underwent cath in June 2022 s/p MERRITT to LCX and POBA to OM1 with 20% residual, patent MERRITT-dRCA and MERRITT-mLAD  · He was [facility] at [facility] from 6/27/2023 to 6/28/2023. EKG was nonischemic and troponins were negative x 3. Lexiscan stress test was reported as negative for ischemia. Echocardiogram was unremarkable. He was seen by cardiology and begun on Imdur 30 mg daily and advised to continue ASA 81 mg daily, Plavix 75 mg daily, atorvastatin 80 mg daily, Toprol-XL 50 mg daily  · He was advised to come to the ED after he saw his primary care physician on 7/6/2023 as he only had mild improvement in his symptoms  · Cardiac cath (7/7/23) - Ost LAD lesion is 90% stenosed. Prior mid LAD, prox LCX, OM, and distal RCA stents are patent.   · Transferred to [facility] for cardiac surgery and cardiology evaluation   · Ct ASA 81 mg daily, Atorvastatin 80 mg daily, Toprol XL 50 mg daily  · Plavix, Losartan held  · Monitor on telemetry

## 2023-07-08 NOTE — PLAN OF CARE
Problem: SAFETY ADULT  Goal: Patient will remain free of falls  Description: INTERVENTIONS:  - Educate patient/family on patient safety including physical limitations  - Instruct patient to call for assistance with activity   - Consult OT/PT to assist with strengthening/mobility   - Keep Call bell within reach  - Keep bed low and locked with side rails adjusted as appropriate  - Keep care items and personal belongings within reach  - Initiate and maintain comfort rounds  - Make Fall Risk Sign visible to staff  - Offer Toileting every  Hours, in advance of need  - Initiate/Maintain alarm  - Obtain necessary fall risk management equipment:   - Apply yellow socks and bracelet for high fall risk patients  - Consider moving patient to room near nurses station  Outcome: Progressing  Goal: Maintain or return to baseline ADL function  Description: INTERVENTIONS:  -  Assess patient's ability to carry out ADLs; assess patient's baseline for ADL function and identify physical deficits which impact ability to perform ADLs (bathing, care of mouth/teeth, toileting, grooming, dressing, etc.)  - Assess/evaluate cause of self-care deficits   - Assess range of motion  - Assess patient's mobility; develop plan if impaired  - Assess patient's need for assistive devices and provide as appropriate  - Encourage maximum independence but intervene and supervise when necessary  - Involve family in performance of ADLs  - Assess for home care needs following discharge   - Consider OT consult to assist with ADL evaluation and planning for discharge  - Provide patient education as appropriate  Outcome: Progressing  Goal: Maintains/Returns to pre admission functional level  Description: INTERVENTIONS:  - Perform BMAT or MOVE assessment daily.   - Set and communicate daily mobility goal to care team and patient/family/caregiver.    - Collaborate with rehabilitation services on mobility goals if consulted  - Perform Range of Motion  times a day.  - Reposition patient every  hours.   - Dangle patient  times a day  - Stand patient  times a day  - Ambulate patient  times a day  - Out of bed to chair  times a day   - Out of bed for meals  times a day  - Out of bed for toileting  - Record patient progress and toleration of activity level   Outcome: Progressing

## 2023-07-08 NOTE — ASSESSMENT & PLAN NOTE
· Home medications: Toprol-XL 50 mg daily, losartan 50 mg daily  · Losartan held in the setting of increase in creatinine from baseline and possible CABG  · Monitor BP

## 2023-07-08 NOTE — H&P
[address] [facility]  H&P  Name: Josue Villanueva 69 y.o. male I MRN: [id number]  Unit/Bed#: [facility] 485-33 I Date of Admission: 7/7/2023   Date of Service: 7/8/2023 I Hospital Day: 1      Assessment/Plan   * Coronary artery disease  Assessment & Plan  · Presented to [address] [place] [address] on 7/6/2023 with a 4-week history of progressively worsening chest discomfort with exertion  · H/o inferior MI in December 2021 - s/p MERRITT to [facility] with residual mLAD disease; had angina and underwent cath on 1/3/2022 with MERRITT to mLAD; stable angina and underwent cath in June 2022 s/p MERRITT to LCX and POBA to OM1 with 20% residual, patent MERRITT-dRCA and MERRITT-mLAD  · He was seen at [facility] AT THE [facility] from 6/27/2023 to 6/28/2023. EKG was nonischemic and troponins were negative x 3. Lexiscan stress test was reported as negative for ischemia. Echocardiogram was unremarkable. He was seen by cardiology and begun on Imdur 30 mg daily and advised to continue ASA 81 mg daily, Plavix 75 mg daily, atorvastatin 80 mg daily, Toprol-XL 50 mg daily  · He was advised to come to the ED after he saw his primary care physician on 7/6/2023 as he only had mild improvement in his symptoms  · Cardiac cath (7/7/23) - Ost LAD lesion is 90% stenosed. Prior mid LAD, prox LCX, OM, and distal RCA stents are patent.   · Transferred to [facility] for cardiac surgery and cardiology evaluation   · Ct ASA 81 mg daily, Atorvastatin 80 mg daily, Toprol XL 50 mg daily  · Plavix, Losartan held  · Monitor on telemetry    Mild persistent asthma without complication  Assessment & Plan  · No exacerbation   · Ct Breo    Uncomplicated opioid dependence (HCC)  Assessment & Plan  · Continue oxycodone IR 5 mg 3 times daily  · Verified on PDMP    Stage 3a chronic kidney disease [facility])  Assessment & Plan  Lab Results   Component Value Date    EGFR 51 07/07/2023    EGFR 42 07/06/2023    EGFR 58 05/14/2021    CREATININE 1.38 (H) 07/07/2023    CREATININE 1.62 (H) 07/06/2023 CREATININE 1.28 05/14/2021   · Baseline creatinine 1.1 to 1.3  · Creatinine elevated at 1.62 on 7/6/2023 and improved to 1.38 today  · Losartan held  · Monitor creatinine  · Avoid nephrotoxic medications and hypotension    Gastroesophageal reflux disease without esophagitis  Assessment & Plan  · Continue PPI twice daily    HLD (hyperlipidemia)  Assessment & Plan  · Continue Atorvastatin 80 mg daily, Zetia 10 mg daily    Restless legs  Assessment & Plan  · Continue Pramipexole 0.25 mg daily    Primary hypertension  Assessment & Plan  · Home medications: Toprol-XL 50 mg daily, losartan 50 mg daily  · Losartan held in the setting of increase in creatinine from baseline and possible CABG  · Monitor BP    Low grade fever  Assessment & Plan  · Noted to have a low-grade temperature of 100.1 on admission to [facility] and today  · Pro-Ramez -negative x1  · Chest x-ray -negative  · No cough, dysuria, nausea/vomiting/diarrhea, skin lesions  · Mild leukocytosis - 11.08 -- 10.96  · Recheck Pro-Ramez in a.m  · Possible atelectasis  · Incentive spirometry  · Monitor temperature, WBC      VTE Pharmacologic Prophylaxis: VTE Score: 3 Moderate Risk (Score 3-4) - Pharmacological DVT Prophylaxis Ordered: heparin. Code Status: Level 1 - Full Code   Discussion with family: Patient declined call to . Anticipated Length of Stay: Patient will be admitted on an inpatient basis with an anticipated length of stay of greater than 2 midnights secondary to Coronary artery disease awaiting cardiac surgery evaluation. Total Time Spent on Date of Encounter in care of patient: 65 minutes This time was spent on one or more of the following: performing physical exam; counseling and coordination of care; obtaining or reviewing history; documenting in the medical record; reviewing/ordering tests, medications or procedures; communicating with other healthcare professionals and discussing with patient's family/caregivers.     Chief Complaint: Chest pain with exertion    History of Present Illness:  Ja Haro is a 69 y.o. male with a PMH of coronary artery disease s/p stents, hypertension, hyperlipidemia, GERD, restless leg syndrome, continuous opioid dependence, asthma, CKD 3 who presents as a transfer from [facility] for cardiac surgery and cardiology evaluation for CABG versus PCI. He was sent to the ED there on 7/6/2023 by his primary care physician with a 4-week history of progressively worsening chest discomfort and shortness of breath with exertion which improved only mildly after he was begun on Imdur 30 mg daily by cardiology at [facility] AT THE [facility] on 6/28/2023 after Lexiscan stress test and echo were unremarkable. His chest pain was relieved with rest. Cardiac catheterization done today showed an ostial LAD lesion which was 90% stenosed. He has no chest pain or shortness of breath at rest at present. Noted to have temperature of 100.1F on admission to [facility] on 7/6/23 and again on arrival here. No cough. No nausea, vomiting or diarrhea. No dysuria. Review of Systems:  Review of Systems   Respiratory: Positive for shortness of breath. Cardiovascular: Positive for chest pain. All other systems reviewed and are negative. Past Medical and Surgical History:   Past Medical History:   Diagnosis Date   • Asthma    • Degenerative disc disease, lumbar        Past Surgical History:   Procedure Laterality Date   • HERNIA REPAIR     • TOTAL HIP ARTHROPLASTY Bilateral        Meds/Allergies:  Prior to Admission medications    Medication Sig Start Date End Date Taking?  Authorizing Provider   Albuterol Sulfate (VENTOLIN HFA IN) Inhale  Patient not taking: Reported on 7/6/2023    Historical Provider, MD   aspirin 81 mg chewable tablet Chew 81 mg daily 6/29/23   Historical Provider, MD   atorvastatin (LIPITOR) 80 mg tablet Take 80 mg by mouth daily at bedtime    Historical Provider, MD   fluticasone (FLONASE) 50 mcg/act nasal spray 2 sprays into each nostril daily as needed for allergies    Historical Provider, MD   Fluticasone-Salmeterol (Advair Diskus) 250-50 mcg/dose inhaler Inhale 1 puff 2 (two) times a day 6/16/23   Historical Provider, MD   hydrOXYzine HCL (ATARAX) 50 mg tablet Take 50 mg by mouth 3 (three) times a day as needed for itching or anxiety    Historical Provider, MD   isosorbide mononitrate (IMDUR) 30 mg 24 hr tablet Take 30 mg by mouth daily 6/29/23 9/27/23  Historical Provider, MD   linaCLOtide (Linzess) 72 MCG CAPS Take 72 mcg by mouth daily 6/7/23   Historical Provider, MD   losartan (COZAAR) 50 mg tablet Take 50 mg by mouth daily 3/31/23   Historical Provider, MD   metoprolol succinate (TOPROL-XL) 50 mg 24 hr tablet Take 50 mg by mouth daily 3/27/23   Historical Provider, MD   oxyCODONE (OXY-IR) 5 MG capsule Take 5 mg by mouth 3 (three) times a day    Historical Provider, MD   pantoprazole (PROTONIX) 40 mg tablet Take 40 mg by mouth 2 (two) times a day    Historical Provider, MD   pramipexole (MIRAPEX) 0.125 mg tablet Take 0.25 mg by mouth daily at bedtime 5/11/21   Historical Provider, MD     I have reviewed home medications using recent Epic encounter. Allergies: Allergies   Allergen Reactions   • Shellfish Allergy - Food Allergy Hives     Per patient, states "I can't clean raw shellfish, but I can eat SHRIMP"    • Shellfish-Derived Products - Food Allergy Hives     Per patient, states "I can't clean raw shellfish, but I can eat SHRIMP"    • Penicillins Hives   • Pollen Extract Hives     Per patient, burning itchy eyes, stuffy nose, asthma symptoms.        Social History:  Marital Status: Single   Substance Use History:   Social History     Substance and Sexual Activity   Alcohol Use Yes    Comment: Once every 2 weeks     Social History     Tobacco Use   Smoking Status Former   • Types: Cigarettes   • Passive exposure: Never   Smokeless Tobacco Never   Tobacco Comments    Quit 20 yrs ago     Social History Substance and Sexual Activity   Drug Use Never       Family History:  Nil contributory    Physical Exam:     Vitals:   Blood Pressure: 128/52 (07/08/23 0100)  Pulse: 69 (07/08/23 0100)  Temperature: 100.1 °F (37.8 °C) (07/07/23 2218)  Respirations: 15 (07/07/23 2218)  SpO2: 94 % (07/08/23 0100)    Physical Exam  Vitals reviewed. HENT:      Head: Normocephalic. Ears:      Comments: Hard of hearing     Nose: Nose normal.      Mouth/Throat:      Mouth: Mucous membranes are moist.   Eyes:      Extraocular Movements: Extraocular movements intact. Cardiovascular:      Rate and Rhythm: Normal rate and regular rhythm. Pulmonary:      Effort: Pulmonary effort is normal. No respiratory distress. Breath sounds: Normal breath sounds. No wheezing. Abdominal:      General: Bowel sounds are normal. There is no distension. Palpations: Abdomen is soft. Tenderness: There is no abdominal tenderness. Musculoskeletal:         General: No swelling. Cervical back: Neck supple. Skin:     General: Skin is warm and dry. Neurological:      General: No focal deficit present. Mental Status: He is alert and oriented to person, place, and time.    Psychiatric:         Mood and Affect: Mood normal.          Additional Data:     Lab Results:  Results from last 7 days   Lab Units 07/07/23  0541   WBC Thousand/uL 10.96*   HEMOGLOBIN g/dL 11.3*   HEMATOCRIT % 35.2*   PLATELETS Thousands/uL 208   NEUTROS PCT % 60   LYMPHS PCT % 26   MONOS PCT % 9   EOS PCT % 3     Results from last 7 days   Lab Units 07/07/23  0541 07/06/23  1953   SODIUM mmol/L 140 139   POTASSIUM mmol/L 3.6 5.1   CHLORIDE mmol/L 106 109*   CO2 mmol/L 27 24   BUN mg/dL 25 28*   CREATININE mg/dL 1.38* 1.62*   ANION GAP mmol/L 7 6   CALCIUM mg/dL 8.5 8.5   ALBUMIN g/dL  --  4.0   TOTAL BILIRUBIN mg/dL  --  0.64   ALK PHOS U/L  --  59   ALT U/L  --  15   AST U/L  --  28   GLUCOSE RANDOM mg/dL 80 76                 Results from last 7 days Lab Units 07/1953   PROCALCITONIN ng/ml <0.05       Lines/Drains:  Invasive Devices     Peripheral Intravenous Line  Duration           Peripheral IV 07/06/23 Dorsal (posterior); Left Forearm 1 day                EKG and Other Studies Reviewed on Admission:   · EKG: NSR. HR 65.    ** Please Note: This note has been constructed using a voice recognition system.  **

## 2023-07-09 LAB
ANION GAP SERPL CALCULATED.3IONS-SCNC: 3 MMOL/L
BUN SERPL-MCNC: 18 MG/DL (ref 5–25)
CALCIUM SERPL-MCNC: 8.5 MG/DL (ref 8.3–10.1)
CHLORIDE SERPL-SCNC: 112 MMOL/L (ref 96–108)
CHOLEST SERPL-MCNC: 89 MG/DL
CO2 SERPL-SCNC: 25 MMOL/L (ref 21–32)
CREAT SERPL-MCNC: 1.3 MG/DL (ref 0.6–1.3)
ERYTHROCYTE [DISTWIDTH] IN BLOOD BY AUTOMATED COUNT: 13.1 % (ref 11.6–15.1)
EST. AVERAGE GLUCOSE BLD GHB EST-MCNC: 103 MG/DL
GFR SERPL CREATININE-BSD FRML MDRD: 55 ML/MIN/1.73SQ M
GLUCOSE SERPL-MCNC: 82 MG/DL (ref 65–140)
HBA1C MFR BLD: 5.2 %
HCT VFR BLD AUTO: 34.5 % (ref 36.5–49.3)
HDLC SERPL-MCNC: 28 MG/DL
HGB BLD-MCNC: 11 G/DL (ref 12–17)
LDLC SERPL CALC-MCNC: 40 MG/DL (ref 0–100)
MCH RBC QN AUTO: 29.3 PG (ref 26.8–34.3)
MCHC RBC AUTO-ENTMCNC: 31.9 G/DL (ref 31.4–37.4)
MCV RBC AUTO: 92 FL (ref 82–98)
NONHDLC SERPL-MCNC: 61 MG/DL
PLATELET # BLD AUTO: 207 THOUSANDS/UL (ref 149–390)
PMV BLD AUTO: 10.4 FL (ref 8.9–12.7)
POTASSIUM SERPL-SCNC: 3.7 MMOL/L (ref 3.5–5.3)
RBC # BLD AUTO: 3.76 MILLION/UL (ref 3.88–5.62)
SODIUM SERPL-SCNC: 140 MMOL/L (ref 135–147)
TRIGL SERPL-MCNC: 107 MG/DL
WBC # BLD AUTO: 9.57 THOUSAND/UL (ref 4.31–10.16)

## 2023-07-09 PROCEDURE — 83036 HEMOGLOBIN GLYCOSYLATED A1C: CPT | Performed by: PHYSICIAN ASSISTANT

## 2023-07-09 PROCEDURE — 80048 BASIC METABOLIC PNL TOTAL CA: CPT | Performed by: INTERNAL MEDICINE

## 2023-07-09 PROCEDURE — 85027 COMPLETE CBC AUTOMATED: CPT | Performed by: INTERNAL MEDICINE

## 2023-07-09 PROCEDURE — 93880 EXTRACRANIAL BILAT STUDY: CPT | Performed by: SURGERY

## 2023-07-09 PROCEDURE — 80061 LIPID PANEL: CPT | Performed by: PHYSICIAN ASSISTANT

## 2023-07-09 PROCEDURE — 99232 SBSQ HOSP IP/OBS MODERATE 35: CPT | Performed by: INTERNAL MEDICINE

## 2023-07-09 RX ORDER — BENZONATATE 100 MG/1
100 CAPSULE ORAL 3 TIMES DAILY PRN
Status: DISCONTINUED | OUTPATIENT
Start: 2023-07-09 | End: 2023-07-17 | Stop reason: HOSPADM

## 2023-07-09 RX ORDER — GUAIFENESIN 600 MG/1
600 TABLET, EXTENDED RELEASE ORAL EVERY 12 HOURS SCHEDULED
Status: DISCONTINUED | OUTPATIENT
Start: 2023-07-09 | End: 2023-07-17 | Stop reason: HOSPADM

## 2023-07-09 RX ADMIN — OXYCODONE HYDROCHLORIDE 5 MG: 5 TABLET ORAL at 06:17

## 2023-07-09 RX ADMIN — EZETIMIBE 10 MG: 10 TABLET ORAL at 21:00

## 2023-07-09 RX ADMIN — ISOSORBIDE MONONITRATE 30 MG: 30 TABLET, EXTENDED RELEASE ORAL at 09:49

## 2023-07-09 RX ADMIN — HEPARIN SODIUM 5000 UNITS: 5000 INJECTION INTRAVENOUS; SUBCUTANEOUS at 15:49

## 2023-07-09 RX ADMIN — HEPARIN SODIUM 5000 UNITS: 5000 INJECTION INTRAVENOUS; SUBCUTANEOUS at 06:12

## 2023-07-09 RX ADMIN — TEMAZEPAM 7.5 MG: 7.5 CAPSULE ORAL at 20:54

## 2023-07-09 RX ADMIN — OXYCODONE HYDROCHLORIDE 5 MG: 5 TABLET ORAL at 11:59

## 2023-07-09 RX ADMIN — DOXYCYCLINE 100 MG: 100 CAPSULE ORAL at 20:54

## 2023-07-09 RX ADMIN — HEPARIN SODIUM 5000 UNITS: 5000 INJECTION INTRAVENOUS; SUBCUTANEOUS at 21:00

## 2023-07-09 RX ADMIN — FLUTICASONE FUROATE AND VILANTEROL TRIFENATATE 1 PUFF: 200; 25 POWDER RESPIRATORY (INHALATION) at 09:49

## 2023-07-09 RX ADMIN — METOPROLOL SUCCINATE 50 MG: 50 TABLET, EXTENDED RELEASE ORAL at 09:49

## 2023-07-09 RX ADMIN — PANTOPRAZOLE SODIUM 40 MG: 40 TABLET, DELAYED RELEASE ORAL at 15:49

## 2023-07-09 RX ADMIN — LUBIPROSTONE 8 MCG: 8 CAPSULE, GELATIN COATED ORAL at 20:54

## 2023-07-09 RX ADMIN — MUPIROCIN 1 APPLICATION: 20 OINTMENT TOPICAL at 20:54

## 2023-07-09 RX ADMIN — ASPIRIN 81 MG CHEWABLE TABLET 81 MG: 81 TABLET CHEWABLE at 09:49

## 2023-07-09 RX ADMIN — HYDROCORTISONE: 1 CREAM TOPICAL at 09:49

## 2023-07-09 RX ADMIN — HYDROCORTISONE: 1 CREAM TOPICAL at 18:51

## 2023-07-09 RX ADMIN — GUAIFENESIN 600 MG: 600 TABLET, EXTENDED RELEASE ORAL at 20:54

## 2023-07-09 RX ADMIN — LUBIPROSTONE 8 MCG: 8 CAPSULE, GELATIN COATED ORAL at 09:48

## 2023-07-09 RX ADMIN — PRAMIPEXOLE DIHYDROCHLORIDE 0.25 MG: 0.25 TABLET ORAL at 18:37

## 2023-07-09 RX ADMIN — PANTOPRAZOLE SODIUM 40 MG: 40 TABLET, DELAYED RELEASE ORAL at 06:12

## 2023-07-09 RX ADMIN — ATORVASTATIN CALCIUM 80 MG: 80 TABLET, FILM COATED ORAL at 21:00

## 2023-07-09 RX ADMIN — BENZONATATE 100 MG: 100 CAPSULE ORAL at 18:38

## 2023-07-09 RX ADMIN — MUPIROCIN 1 APPLICATION: 20 OINTMENT TOPICAL at 09:49

## 2023-07-09 RX ADMIN — DOXYCYCLINE 100 MG: 100 CAPSULE ORAL at 09:49

## 2023-07-09 RX ADMIN — OXYCODONE HYDROCHLORIDE 5 MG: 5 TABLET ORAL at 18:37

## 2023-07-09 NOTE — ASSESSMENT & PLAN NOTE
· [facility] with a 4-week history of progressively worsening chest discomfort with exertion  · H/o inferior MI in December 2021 - s/p MERRITT to dRCA with residual mLAD disease; had angina and underwent cath on 1/3/2022 with MERRITT to mLAD; stable angina and underwent cath in June 2022 s/p MERRITT to LCX and POBA to OM1 with 20% residual, patent MERRITT-dRCA and MERRITT-mLAD  · He was seen at [facility] THE [facility] from 6/27/2023 to 6/28/2023. EKG was nonischemic and troponins were negative x 3. Lexiscan stress test was reported as negative for ischemia. Echocardiogram was unremarkable. He was seen by cardiology and begun on Imdur 30 mg daily and advised to continue ASA 81 mg daily, Plavix 75 mg daily, atorvastatin 80 mg daily, Toprol-XL 50 mg daily  · He was advised to come to the ED after he saw his primary care physician on 7/6/2023 as he only had mild improvement in his symptoms  · Cardiac cath (7/7/23) - Ost LAD lesion is 90% stenosed. Prior mid LAD, prox LCX, OM, and distal RCA stents are patent.   · Transferred to [facility] for cardiac surgery and cardiology evaluation   · Ct ASA 81 mg daily, Atorvastatin 80 mg daily, Toprol XL 50 mg daily  · Plavix, Losartan held  · Evaluated by CT surgery, work-up for CABG ordered  · Monitor on telemetry

## 2023-07-09 NOTE — PROGRESS NOTES
Cardiology Progress Note - Courtney Schilder 69 y.o. male MRN: [id number]    Unit/Bed#: [facility] 419-01 Encounter: [id number]      Assessment/Recommendations:  1. Coronary artery disease: Currently chest pain-free. History of inferior MI in 2021 with stent to distal RCA, stent to mid LAD in January 2022 as well as stent to left circumflex and OM1 in June 2022. Cardiac catheterization on 7/7/2023 revealed ostial LAD lesion, 90% stenosis with patent prior stents in the setting of chest pain. Now for CABG, likely this week. Low normal LV function with no specific wall motion normalities. Continued on aspirin, statin, Imdur, beta-blocker. 2.  Fever without leukocytosis: Work-up as per primary team.  3.  Hypertension: Continue current management. 4.  Hyperlipidemia: Continue on statin. 5.  Mild persistent asthma: As per primary team.  6.  Opioid dependence: As per primary team.  7.  Chronic kidney disease: Stage IIIa with baseline creatinine 1.1-1.3, creatinine currently at baseline. Subjective:   Patient seen and examined. No significant events overnight.  ; pertinent negatives - chest pain, chest pressure/discomfort, dyspnea, irregular heart beat, lower extremity edema and palpitations. Objective:     Vitals: Blood pressure 147/73, pulse 67, temperature 100.3 °F (37.9 °C), resp. rate 15, height 6' (1.829 m), weight 83.1 kg (183 lb 3.2 oz), SpO2 97 %. , Body mass index is 24.85 kg/m².,   Orthostatic Blood Pressures    Flowsheet Row Most Recent Value   Blood Pressure 147/73 filed at 07/09/2023 0721   Patient Position - Orthostatic VS Lying filed at 07/08/2023 1049            Intake/Output Summary (Last 24 hours) at 7/9/2023 0909  Last data filed at 7/9/2023 0817  Gross per 24 hour   Intake 568 ml   Output --   Net 568 ml       TELE: No significant arrhythmias seen.     Physical Exam:    GEN: Courtney Schilder appears well, alert and oriented x 3, pleasant and cooperative   HEENT: pupils equal, round, and reactive to light; extraocular muscles intact  NECK: supple, no carotid bruits   HEART: regular rhythm, normal S1 and S2, no murmurs, clicks, gallops or rubs   LUNGS: clear to auscultation bilaterally; no wheezes, rales, or rhonchi   ABDOMEN: normal bowel sounds, soft, no tenderness, no distention  EXTREMITIES: peripheral pulses normal; no clubbing, cyanosis, or edema  NEURO: no focal findings   SKIN: normal without suspicious lesions on exposed skin    Medications:      Current Facility-Administered Medications:   •  acetaminophen (TYLENOL) tablet 975 mg, 975 mg, Oral, Q6H PRN, Abe Rebolledo MD, 975 mg at 07/08/23 2048  •  albuterol (PROVENTIL HFA,VENTOLIN HFA) inhaler 2 puff, 2 puff, Inhalation, Q4H PRN, Abe Rebolledo MD  •  aluminum-magnesium hydroxide-simethicone (MAALOX) oral suspension 30 mL, 30 mL, Oral, Q6H PRN, Abe Rebolledo MD  •  aspirin chewable tablet 81 mg, 81 mg, Oral, Daily, Abe Rebolledo MD, 81 mg at 07/08/23 5452  •  atorvastatin (LIPITOR) tablet 80 mg, 80 mg, Oral, HS, Abe Rebolledo MD, 80 mg at 07/08/23 2102  •  chlorhexidine (PERIDEX) 0.12 % oral rinse 15 mL, 15 mL, Swish & Spit, Q12H [address], Dayanna Mitchell PA-C, 15 mL at 07/08/23 1034  •  doxycycline hyclate (VIBRAMYCIN) capsule 100 mg, 100 mg, Oral, Q12H [address], Evelyn Wang MD, 100 mg at 07/08/23 2104  •  ezetimibe (ZETIA) tablet 10 mg, 10 mg, Oral, HS, Abe Rebolledo MD, 10 mg at 07/08/23 2102  •  fluticasone (FLONASE) 50 mcg/act nasal spray 2 spray, 2 spray, Nasal, Daily PRN, Abe Rebolledo MD  •  fluticasone-vilanterol 200-25 mcg/actuation 1 puff, 1 puff, Inhalation, Daily, Abe Rebolledo MD, 1 puff at 07/08/23 0809  •  heparin (porcine) subcutaneous injection 5,000 Units, 5,000 Units, Subcutaneous, Q8H [address], Abe Rebolledo MD, 5,000 Units at 07/09/23 0612  •  hydrocortisone 1 % cream, , Topical, BID, Abe Rebolledo MD, Given at 07/08/23 1731  •  hydrOXYzine HCL (ATARAX) tablet 50 mg, 50 mg, Oral, TID PRN, Carol Schaefer MD  •  isosorbide mononitrate (IMDUR) 24 hr tablet 30 mg, 30 mg, Oral, Daily, Carol Schaefer MD, 30 mg at 07/08/23 4043  •  lubiprostone (AMITIZA) capsule 8 mcg, 8 mcg, Oral, BID, Carol Schaefer MD, 8 mcg at 07/08/23 2102  •  metoprolol succinate (TOPROL-XL) 24 hr tablet 50 mg, 50 mg, Oral, Daily, Carol Schaefer MD, 50 mg at 07/08/23 2396  •  mupirocin (BACTROBAN) 2 % nasal ointment, , Nasal, Q12H [address], Dayanna Mitchell PA-C, 1 Application at 94/85/93 2108  •  nitroglycerin (NITROSTAT) SL tablet 0.4 mg, 0.4 mg, Sublingual, Q5 Min PRN, Carol Schaefer MD  •  ondansetron [facility]) injection 4 mg, 4 mg, Intravenous, Q6H PRN, Carol Schaefer MD  •  oxyCODONE (ROXICODONE) IR tablet 5 mg, 5 mg, Oral, TID, Carol Schaefer MD, 5 mg at 07/09/23 0617  •  oxymetazoline (AFRIN) 0.05 % nasal spray 2 spray, 2 spray, Each Nare, Q12H PRN, Carol Schaefer MD  •  pantoprazole (PROTONIX) EC tablet 40 mg, 40 mg, Oral, BID AC, Carol Schaefer MD, 40 mg at 07/09/23 0095  •  pramipexole (MIRAPEX) tablet 0.25 mg, 0.25 mg, Oral, After Claude Evener, MD, 0.25 mg at 07/08/23 1730  •  simethicone (MYLICON) chewable tablet 80 mg, 80 mg, Oral, 4x Daily PRN, Carol Schaefer MD  •  temazepam (RESTORIL) capsule 7.5 mg, 7.5 mg, Oral, HS PRN, Carol Schaefer MD, 7.5 mg at 07/08/23 2104     Labs & Results:        Results from last 7 days   Lab Units 07/09/23  0507 07/08/23  0536 07/07/23  0541   WBC Thousand/uL 9.57 10.11 10.96*   HEMOGLOBIN g/dL 11.0* 11.3* 11.3*   HEMATOCRIT % 34.5* 35.2* 35.2*   PLATELETS Thousands/uL 207 209 208     Results from last 7 days   Lab Units 07/09/23  0507   TRIGLYCERIDES mg/dL 107   HDL mg/dL 28*     Results from last 7 days   Lab Units 07/09/23  0507 07/08/23  0536 07/07/23  0541 07/06/23  1953   POTASSIUM mmol/L 3.7 3.8 3.6 5.1   CHLORIDE mmol/L 112* 113* 106 109*   CO2 mmol/L 25 24 27 24   BUN mg/dL 18 22 25 28*   CREATININE mg/dL 1.30 1.42* 1.38* 1.62*   CALCIUM mg/dL 8.5 8.1* 8.5 8.5   ALK PHOS U/L  --   --   --  59   ALT U/L  --   --   --  15   AST U/L  --   --   --  28         Results from last 7 days   Lab Units 07/08/23  0536   MAGNESIUM mg/dL 1.9       Echo: personally reviewed -low normal LV function.     EKG personally reviewed by Marielena Alvarez MD.

## 2023-07-09 NOTE — ASSESSMENT & PLAN NOTE
Lab Results   Component Value Date    EGFR 55 07/09/2023    EGFR 50 07/08/2023    EGFR 51 07/07/2023    CREATININE 1.30 07/09/2023    CREATININE 1.42 (H) 07/08/2023    CREATININE 1.38 (H) 07/07/2023   · Baseline creatinine 1.1 to 1.3  · Creatinine elevated at 1.62 on 7/6/2023 and improved now  · Losartan held  · Monitor creatinine  · Avoid nephrotoxic medications and hypotension

## 2023-07-09 NOTE — ASSESSMENT & PLAN NOTE
· Intermittent fever  · A few days ago patient had a right ear pressure, right-sided sinus pain/pressure, later developed postnasal drip and productive cough  · Suspect sinus infection, viral upper respiratory tract  infection  · Pro-Ramez -negative x2  · Chest x-ray 7/6 negative  · COVID/flu/RSV negative  · Blood cultures negative at 24 hours  · CT chest showed mild edema, minimal dependent atelectasis in the lower lobes, no consolidation  · Afrin significantly improved his symptoms, relieving sinus pressure  · Continue doxycycline for possible sinus infection, he has penicillin allergy and not sure if he ever had cephalosporins  · Mucinex, Tessalon Perles  · Incentive spirometry

## 2023-07-09 NOTE — PROGRESS NOTES
[address] [facility]  Progress Note  Name: Darian Bui  MRN: [id number]  Unit/Bed#: PPHP 000-86 I Date of Admission: 7/7/2023   Date of Service: 7/9/2023 I Hospital Day: 2    Assessment/Plan   * Coronary artery disease  Assessment & Plan  · Presented to [facility] on 7/6/2023 with a 4-week history of progressively worsening chest discomfort with exertion  · H/o inferior MI in December 2021 - s/p MERRITT to [facility] with residual mLAD disease; had angina and underwent cath on 1/3/2022 with MERRITT to mLAD; stable angina and underwent cath in June 2022 s/p MERRITT to LCX and POBA to OM1 with 20% residual, patent MERRITT-dRCA and MERRITT-mLAD  · He was seen at [address] [facility] from 6/27/2023 to 6/28/2023. EKG was nonischemic and troponins were negative x 3. Lexiscan stress test was reported as negative for ischemia. Echocardiogram was unremarkable. He was seen by cardiology and begun on Imdur 30 mg daily and advised to continue ASA 81 mg daily, Plavix 75 mg daily, atorvastatin 80 mg daily, Toprol-XL 50 mg daily  · He was advised to come to the ED after he saw his primary care physician on 7/6/2023 as he only had mild improvement in his symptoms  · Cardiac cath (7/7/23) - Ost LAD lesion is 90% stenosed. Prior mid LAD, prox LCX, OM, and distal RCA stents are patent.   · Transferred to [facility] for cardiac surgery and cardiology evaluation   · Ct ASA 81 mg daily, Atorvastatin 80 mg daily, Toprol XL 50 mg daily  · Plavix, Losartan held  · Evaluated by CT surgery, work-up for CABG ordered  · Monitor on telemetry    Fever  Assessment & Plan  · Intermittent fever  · A few days ago patient had a right ear pressure, right-sided sinus pain/pressure, later developed postnasal drip and productive cough  · Suspect sinus infection, viral upper respiratory tract  infection  · Pro-Ramez -negative x2  · Chest x-ray 7/6 negative  · COVID/flu/RSV negative  · Blood cultures negative at 24 hours  · CT chest showed mild edema, minimal dependent atelectasis in the lower lobes, no consolidation  · Afrin significantly improved his symptoms, relieving sinus pressure  · Continue doxycycline for possible sinus infection, he has penicillin allergy and not sure if he ever had cephalosporins  · Mucinex, Tessalon Perles  · Incentive spirometry    Irritable bowel syndrome with constipation  Assessment & Plan  · History of IBS C, on Linzess  · Usually has a bowel movement every 4 days, on the day he has a bowel movements he has severe cramping and he usually spends the day in bed.  Had BMs 7/8  · Will bring in [address] [facility] from home    Primary hypertension  Assessment & Plan  · Home medications: Toprol-XL 50 mg daily, losartan 50 mg daily  · Losartan held in the setting of increase in creatinine from baseline and possible CABG  · Monitor BP    Stage 3a chronic kidney disease [facility])  Assessment & Plan  Lab Results   Component Value Date    EGFR 55 07/09/2023    EGFR 50 07/08/2023    EGFR 51 07/07/2023    CREATININE 1.30 07/09/2023    CREATININE 1.42 (H) 07/08/2023    CREATININE 1.38 (H) 07/07/2023   · Baseline creatinine 1.1 to 1.3  · Creatinine elevated at 1.62 on 7/6/2023 and improved now  · Losartan held  · Monitor creatinine  · Avoid nephrotoxic medications and hypotension    [place] hearing[place]  Assessment & Plan  · noted    Uncomplicated opioid dependence (HCC)  Assessment & Plan  · Continue oxycodone IR 5 mg 3 times daily, at 6 AM, 12 PM and 6 PM  · Verified on PDMP    Mild persistent asthma without complication  Assessment & Plan  · No exacerbation   · Continue Breo  · Albuterol inhaler as needed    Restless legs  Assessment & Plan  · Continue Pramipexole 0.25 mg daily    HLD (hyperlipidemia)  Assessment & Plan  · Continue Atorvastatin 80 mg daily, Zetia 10 mg daily    Gastroesophageal reflux disease without esophagitis  Assessment & Plan  · Continue PPI twice daily         VTE Pharmacologic Prophylaxis: VTE Score: 3 Moderate Risk (Score 3-4) - Pharmacological DVT Prophylaxis Ordered: heparin. Patient Centered Rounds: Discussed with nursing  Discussions with Specialists or Other Care Team Provider: Nursing    Education and Discussions with Family / Patient: Patient declined call to . Total Time Spent on Date of Encounter in care of patient: 35 minutes This time was spent on one or more of the following: performing physical exam; counseling and coordination of care; obtaining or reviewing history; documenting in the medical record; reviewing/ordering tests, medications or procedures; communicating with other healthcare professionals and discussing with patient's family/caregivers. Current Length of Stay: 2 day(s)  Current Patient Status: Inpatient   Certification Statement: The patient will continue to require additional inpatient hospital stay due to Awaiting CABG  Discharge Plan: Anticipate discharge in >72 hrs to discharge location to be determined pending rehab evaluations. Code Status: Level 1 - Full Code    Subjective:   Patient was seen evaluated bedside, complaining of chest congestion and bringing up thick phlegm. Denies chest pain palpitation shortness of breath    Objective:     Vitals:   Temp (24hrs), Av °F (37.8 °C), Min:98.8 °F (37.1 °C), Max:101.6 °F (38.7 °C)    Temp:  [98.8 °F (37.1 °C)-101.6 °F (38.7 °C)] 100.3 °F (37.9 °C)  HR:  [61-70] 61  Resp:  [13-16] 15  BP: (128-147)/(52-73) 129/65  SpO2:  [94 %-100 %] 97 %  Body mass index is 24.85 kg/m². Input and Output Summary (last 24 hours): Intake/Output Summary (Last 24 hours) at 2023 1540  Last data filed at 2023 0817  Gross per 24 hour   Intake 270 ml   Output --   Net 270 ml       Physical Exam:   Physical Exam  Constitutional:       General: He is not in acute distress. HENT:      Head: Atraumatic. Cardiovascular:      Rate and Rhythm: Normal rate and regular rhythm. Heart sounds: No murmur heard. No friction rub. No gallop. Pulmonary:      Effort: Pulmonary effort is normal. No respiratory distress. Breath sounds: Normal breath sounds. No wheezing. Abdominal:      General: Bowel sounds are normal. There is no distension. Palpations: Abdomen is soft. Tenderness: There is no abdominal tenderness. Musculoskeletal:         General: No swelling. Cervical back: Neck supple. Skin:     General: Skin is warm and dry. Neurological:      General: No focal deficit present. Mental Status: He is alert. Psychiatric:         Mood and Affect: Mood normal.          Additional Data:     Labs:  Results from last 7 days   Lab Units 07/09/23  0507 07/08/23  0536   WBC Thousand/uL 9.57 10.11   HEMOGLOBIN g/dL 11.0* 11.3*   HEMATOCRIT % 34.5* 35.2*   PLATELETS Thousands/uL 207 209   NEUTROS PCT %  --  66   LYMPHS PCT %  --  22   MONOS PCT %  --  10   EOS PCT %  --  2     Results from last 7 days   Lab Units 07/09/23  0507 07/07/23  0541 07/1953   SODIUM mmol/L 140   < > 139   POTASSIUM mmol/L 3.7   < > 5.1   CHLORIDE mmol/L 112*   < > 109*   CO2 mmol/L 25   < > 24   BUN mg/dL 18   < > 28*   CREATININE mg/dL 1.30   < > 1.62*   ANION GAP mmol/L 3   < > 6   CALCIUM mg/dL 8.5   < > 8.5   ALBUMIN g/dL  --   --  4.0   TOTAL BILIRUBIN mg/dL  --   --  0.64   ALK PHOS U/L  --   --  59   ALT U/L  --   --  15   AST U/L  --   --  28   GLUCOSE RANDOM mg/dL 82   < > 76    < > = values in this interval not displayed. Results from last 7 days   Lab Units 07/09/23  0507   HEMOGLOBIN A1C % 5.2     Results from last 7 days   Lab Units 07/08/23  0536 07/1955   PROCALCITONIN ng/ml <0.05 <0.05       Lines/Drains:  Invasive Devices     Peripheral Intravenous Line  Duration           Peripheral IV 07/06/23 Dorsal (posterior); Left Forearm 2 days                  Telemetry:  Telemetry Orders (From admission, onward)             24 Hour Telemetry Monitoring  Continuous x 24 Hours (Telem)        Question:  Reason for 24 Hour Telemetry  Answer:  PCI/EP study (including pacer and ICD implementation), Cardiac surgery, MI, abnormal cardiac cath, and chest pain- rule out MI                 Telemetry Reviewed: Normal Sinus Rhythm  Indication for Continued Telemetry Use: Awaiting PCI/EP Study/CABG             Imaging: Reviewed radiology reports from this admission including: chest xray and chest CT scan    Recent Cultures (last 7 days):   Results from last 7 days   Lab Units 07/08/23  0849   BLOOD CULTURE  No Growth at 24 hrs. No Growth at 24 hrs.        Last 24 Hours Medication List:   Current Facility-Administered Medications   Medication Dose Route Frequency Provider Last Rate   • acetaminophen  975 mg Oral Q6H PRN Laura Marcelo MD     • albuterol  2 puff Inhalation Q4H PRN Laura Marcelo MD     • aluminum-magnesium hydroxide-simethicone  30 mL Oral Q6H PRN Laura Marcelo MD     • aspirin  81 mg Oral Daily Laura Marcelo MD     • atorvastatin  80 mg Oral HS Laura Marcelo MD     • benzonatate  100 mg Oral TID PRN Apple Cuenca MD     • chlorhexidine  15 mL Swish & Spit Q12H [address] PATIENCE Bishop-C     • doxycycline hyclate  100 mg Oral Q12H Мария Martinez MD     • ezetimibe  10 mg Oral HS Laura Marcelo MD     • fluticasone  2 spray Nasal Daily PRN Laura Marcelo MD     • fluticasone-vilanterol  1 puff Inhalation Daily Laura Marcelo MD     • guaiFENesin  600 mg Oral Q12H Мария Martinez MD     • heparin (porcine)  5,000 Units Subcutaneous [facility] Laura Marcelo MD     • hydrocortisone   Topical BID Laura Marcelo MD     • hydrOXYzine HCL  50 mg Oral TID PRN Laura Marcelo MD     • isosorbide mononitrate  30 mg Oral Daily Laura Marcelo MD     • lubiprostone  8 mcg Oral BID Laura Marcelo MD     • metoprolol succinate  50 mg Oral Daily Laura Marcelo MD     • mupirocin   Nasal Q12H [address] Dayanna Mitchell PA-C     • nitroglycerin  0.4 mg Sublingual Q5 Min PRN Laura Marcelo MD • ondansetron  4 mg Intravenous Q6H PRN Odalys Paz MD     • oxyCODONE  5 mg Oral TID Chelsea Sanchez MD     • oxymetazoline  2 spray Each Nare Q12H PRN Odalys Paz MD     • pantoprazole  40 mg Oral BID AC Odalys Paz MD     • pramipexole  0.25 mg Oral After Dolores Lockwood MD     • simethicone  80 mg Oral 4x Daily PRN Odalys Paz MD     • temazepam  7.5 mg Oral HS PRN Odalys Paz MD          Today, Patient Was Seen By: Chelsea Sanchez MD    **Please Note: This note may have been constructed using a voice recognition system. **

## 2023-07-09 NOTE — ASSESSMENT & PLAN NOTE
· History of IBS C, on Linzess  · Usually has a bowel movement every 4 days, on the day he has a bowel movements he has severe cramping and he usually spends the day in bed.  Had BMs 7/8  · Will bring in [address] [place] [address] from home

## 2023-07-10 ENCOUNTER — PREP FOR PROCEDURE (OUTPATIENT)
Dept: CARDIAC SURGERY | Facility: CLINIC | Age: 68
End: 2023-07-10

## 2023-07-10 DIAGNOSIS — I20.9 ANGINA PECTORIS (HCC): Primary | ICD-10-CM

## 2023-07-10 DIAGNOSIS — I25.10 CORONARY ARTERY DISEASE INVOLVING NATIVE CORONARY ARTERY OF NATIVE HEART WITHOUT ANGINA PECTORIS: ICD-10-CM

## 2023-07-10 LAB
ABO GROUP BLD: NORMAL
ANION GAP SERPL CALCULATED.3IONS-SCNC: 6 MMOL/L
BLD GP AB SCN SERPL QL: NEGATIVE
BUN SERPL-MCNC: 15 MG/DL (ref 5–25)
CALCIUM SERPL-MCNC: 8.4 MG/DL (ref 8.3–10.1)
CHLORIDE SERPL-SCNC: 109 MMOL/L (ref 96–108)
CO2 SERPL-SCNC: 25 MMOL/L (ref 21–32)
CREAT SERPL-MCNC: 1.22 MG/DL (ref 0.6–1.3)
ERYTHROCYTE [DISTWIDTH] IN BLOOD BY AUTOMATED COUNT: 12.7 % (ref 11.6–15.1)
GFR SERPL CREATININE-BSD FRML MDRD: 60 ML/MIN/1.73SQ M
GLUCOSE SERPL-MCNC: 83 MG/DL (ref 65–140)
HCT VFR BLD AUTO: 32.4 % (ref 36.5–49.3)
HGB BLD-MCNC: 10.6 G/DL (ref 12–17)
MCH RBC QN AUTO: 29.3 PG (ref 26.8–34.3)
MCHC RBC AUTO-ENTMCNC: 32.7 G/DL (ref 31.4–37.4)
MCV RBC AUTO: 90 FL (ref 82–98)
PLATELET # BLD AUTO: 232 THOUSANDS/UL (ref 149–390)
PMV BLD AUTO: 10.6 FL (ref 8.9–12.7)
POTASSIUM SERPL-SCNC: 3.5 MMOL/L (ref 3.5–5.3)
RBC # BLD AUTO: 3.62 MILLION/UL (ref 3.88–5.62)
RH BLD: POSITIVE
SODIUM SERPL-SCNC: 140 MMOL/L (ref 135–147)
SPECIMEN EXPIRATION DATE: NORMAL
WBC # BLD AUTO: 7.59 THOUSAND/UL (ref 4.31–10.16)

## 2023-07-10 PROCEDURE — 85027 COMPLETE CBC AUTOMATED: CPT | Performed by: INTERNAL MEDICINE

## 2023-07-10 PROCEDURE — 99232 SBSQ HOSP IP/OBS MODERATE 35: CPT | Performed by: PHYSICIAN ASSISTANT

## 2023-07-10 PROCEDURE — 86901 BLOOD TYPING SEROLOGIC RH(D): CPT | Performed by: PHYSICIAN ASSISTANT

## 2023-07-10 PROCEDURE — 99232 SBSQ HOSP IP/OBS MODERATE 35: CPT | Performed by: INTERNAL MEDICINE

## 2023-07-10 PROCEDURE — 80048 BASIC METABOLIC PNL TOTAL CA: CPT | Performed by: INTERNAL MEDICINE

## 2023-07-10 PROCEDURE — 94760 N-INVAS EAR/PLS OXIMETRY 1: CPT

## 2023-07-10 PROCEDURE — 86900 BLOOD TYPING SEROLOGIC ABO: CPT | Performed by: PHYSICIAN ASSISTANT

## 2023-07-10 PROCEDURE — 86850 RBC ANTIBODY SCREEN: CPT | Performed by: PHYSICIAN ASSISTANT

## 2023-07-10 RX ORDER — TEMAZEPAM 15 MG/1
15 CAPSULE ORAL
Status: DISCONTINUED | OUTPATIENT
Start: 2023-07-10 | End: 2023-07-17 | Stop reason: HOSPADM

## 2023-07-10 RX ORDER — POTASSIUM CHLORIDE 20 MEQ/1
20 TABLET, EXTENDED RELEASE ORAL ONCE
Status: COMPLETED | OUTPATIENT
Start: 2023-07-10 | End: 2023-07-10

## 2023-07-10 RX ADMIN — GUAIFENESIN 600 MG: 600 TABLET, EXTENDED RELEASE ORAL at 21:18

## 2023-07-10 RX ADMIN — MUPIROCIN 1 APPLICATION: 20 OINTMENT TOPICAL at 08:43

## 2023-07-10 RX ADMIN — POTASSIUM CHLORIDE 20 MEQ: 1500 TABLET, EXTENDED RELEASE ORAL at 08:48

## 2023-07-10 RX ADMIN — OXYCODONE HYDROCHLORIDE 5 MG: 5 TABLET ORAL at 12:10

## 2023-07-10 RX ADMIN — EZETIMIBE 10 MG: 10 TABLET ORAL at 21:18

## 2023-07-10 RX ADMIN — HEPARIN SODIUM 5000 UNITS: 5000 INJECTION INTRAVENOUS; SUBCUTANEOUS at 13:47

## 2023-07-10 RX ADMIN — HYDROCORTISONE: 1 CREAM TOPICAL at 08:55

## 2023-07-10 RX ADMIN — PANTOPRAZOLE SODIUM 40 MG: 40 TABLET, DELAYED RELEASE ORAL at 17:02

## 2023-07-10 RX ADMIN — ATORVASTATIN CALCIUM 80 MG: 80 TABLET, FILM COATED ORAL at 21:18

## 2023-07-10 RX ADMIN — PANTOPRAZOLE SODIUM 40 MG: 40 TABLET, DELAYED RELEASE ORAL at 08:43

## 2023-07-10 RX ADMIN — HYDROCORTISONE: 1 CREAM TOPICAL at 17:05

## 2023-07-10 RX ADMIN — GUAIFENESIN 600 MG: 600 TABLET, EXTENDED RELEASE ORAL at 08:43

## 2023-07-10 RX ADMIN — DOXYCYCLINE 100 MG: 100 CAPSULE ORAL at 21:18

## 2023-07-10 RX ADMIN — ISOSORBIDE MONONITRATE 30 MG: 30 TABLET, EXTENDED RELEASE ORAL at 08:43

## 2023-07-10 RX ADMIN — ACETAMINOPHEN 975 MG: 325 TABLET, FILM COATED ORAL at 13:47

## 2023-07-10 RX ADMIN — TEMAZEPAM 15 MG: 15 CAPSULE ORAL at 23:44

## 2023-07-10 RX ADMIN — DOXYCYCLINE 100 MG: 100 CAPSULE ORAL at 08:43

## 2023-07-10 RX ADMIN — METOPROLOL SUCCINATE 50 MG: 50 TABLET, EXTENDED RELEASE ORAL at 08:43

## 2023-07-10 RX ADMIN — ASPIRIN 81 MG CHEWABLE TABLET 81 MG: 81 TABLET CHEWABLE at 08:43

## 2023-07-10 RX ADMIN — LUBIPROSTONE 8 MCG: 8 CAPSULE, GELATIN COATED ORAL at 08:44

## 2023-07-10 RX ADMIN — OXYCODONE HYDROCHLORIDE 5 MG: 5 TABLET ORAL at 17:04

## 2023-07-10 RX ADMIN — OXYCODONE HYDROCHLORIDE 5 MG: 5 TABLET ORAL at 05:23

## 2023-07-10 RX ADMIN — HEPARIN SODIUM 5000 UNITS: 5000 INJECTION INTRAVENOUS; SUBCUTANEOUS at 05:24

## 2023-07-10 RX ADMIN — PRAMIPEXOLE DIHYDROCHLORIDE 0.25 MG: 0.25 TABLET ORAL at 17:05

## 2023-07-10 RX ADMIN — FLUTICASONE FUROATE AND VILANTEROL TRIFENATATE 1 PUFF: 200; 25 POWDER RESPIRATORY (INHALATION) at 08:44

## 2023-07-10 RX ADMIN — LUBIPROSTONE 8 MCG: 8 CAPSULE, GELATIN COATED ORAL at 21:18

## 2023-07-10 NOTE — ASSESSMENT & PLAN NOTE
Lab Results   Component Value Date    EGFR 61 07/14/2023    EGFR 57 07/13/2023    EGFR 54 07/12/2023    CREATININE 1.20 07/14/2023    CREATININE 1.27 07/13/2023    CREATININE 1.33 (H) 07/12/2023     Baseline 1.1-1.3  Losartan held  Avoid nephrotoxins and hypotension  Encouraged oral hydration

## 2023-07-10 NOTE — ASSESSMENT & PLAN NOTE
• Intermittent fever  • A few days ago patient had a right ear pressure, right-sided sinus pain/pressure, later developed postnasal drip and productive cough  • Suspect sinus infection, viral upper respiratory tract  infection  • Pro-Ramez -negative x2  • Chest x-ray 7/6 negative  • COVID/flu/RSV negative  • Blood cultures negative at 24 hours  • CT chest showed mild edema, minimal dependent atelectasis in the lower lobes, no consolidation  • Afrin significantly improved his symptoms, relieving sinus pressure  • Continue doxycycline for possible sinus infection, he has penicillin allergy and not sure if he ever had cephalosporins      Currently afebrile for 24 hrs without leukocytosis    Day 5 out of 5 doxycycline, complete  • Mucinex, Tessalon Perles  • Incentive spirometry  • Continue Afrin

## 2023-07-10 NOTE — PROGRESS NOTES
General Cardiology   Progress Note -  Team One   Sadie Ramirez 69 y.o. male MRN: [id number]  Unit/Bed#: [facility] 419-01 Encounter: [id number]    Assessment    1. Coronary artery disease with hx of inferior MI 2021 s/p PCI dRCA, PCI of mLAD 01/2022, PCI of LCx and OM1 06/2022  Presented with exertional chest pain, SOB, fatigue and weakness  Hs troponin negative  Cardiac catheterization 7/7/2023 revealed ostial LAD lesion, 90% stenosis with patent prior stents  Now for CABG later this week  TTE showed low normal LV function with no specific wall motion normalities. Continued on aspirin, statin, Imdur, beta-blocker. Currently chest pain-free        2. Fever without leukocytosis  C/o sinus congestion that began a few days prior to admission  COVID/Flu/RSV negative  Symptoms improving with nasal spray and oral antibiotics    3. Hypertension- 24 hour average /62 on Imdur 30 mg daily and Toprol XL 50 mg daily    4. Hyperlipidemia- TC 89, , HDL 28, LDL 40 on atorvastatin 80 mg daily    5. Mild persistent asthma    6. Opioid dependence    7. CKD IIIa- baseline creatinine 1.1-1.3; currently at baseline 1.22    Plan  · Continue medical management of CAD while awaiting CABG- ASA, statin, Imdur, and Toprol XL  · Management of URI per primary team    Subjective  Review of Systems   Constitutional: Positive for malaise/fatigue. Negative for chills, diaphoresis and weight gain. HENT: Positive for congestion (improving). Cardiovascular: Negative for chest pain, dyspnea on exertion, leg swelling, orthopnea, palpitations and syncope. Respiratory: Negative for cough, shortness of breath, sleep disturbances due to breathing and sputum production. Endocrine: Negative. Gastrointestinal: Negative for bloating, nausea and vomiting. Genitourinary: Negative. Neurological: Positive for weakness. Negative for dizziness and light-headedness. Psychiatric/Behavioral: Negative for altered mental status. All other systems reviewed and are negative. Objective:   Vitals: Blood pressure 147/71, pulse 62, temperature 98.6 °F (37 °C), temperature source Oral, resp. rate 18, height 6' (1.829 m), weight 83.1 kg (183 lb 3.2 oz), SpO2 96 %. , Body mass index is 24.85 kg/m². ,     Systolic (37TEK), NGR:673 , Min:129 , XJA:584     Diastolic (29ARM), ODT:58, Min:49, Max:71    Intake/Output Summary (Last 24 hours) at 7/10/2023 0831  Last data filed at 7/9/2023 1736  Gross per 24 hour   Intake 225 ml   Output --   Net 225 ml     Wt Readings from Last 3 Encounters:   07/08/23 83.1 kg (183 lb 3.2 oz)   07/07/23 83.1 kg (183 lb 4.8 oz)   05/13/21 89.9 kg (198 lb 3.1 oz)     Telemetry Review: NSR    Physical Exam  Vitals reviewed. Constitutional:       General: He is not in acute distress. HENT:      Ears:      Comments: [place]  Neck:      Vascular: No hepatojugular reflux or JVD. Cardiovascular:      Rate and Rhythm: Normal rate and regular rhythm. Pulses: Normal pulses. Heart sounds: Normal heart sounds. No murmur heard. No friction rub. No gallop. Pulmonary:      Effort: Pulmonary effort is normal. No respiratory distress. Breath sounds: No rales. Comments: Room air  Abdominal:      General: Bowel sounds are normal. There is no distension. Palpations: Abdomen is soft. Tenderness: There is no abdominal tenderness. Musculoskeletal:         General: No tenderness. Normal range of motion. Cervical back: Neck supple. Right lower leg: No edema. Left lower leg: No edema. Skin:     General: Skin is warm and dry. Capillary Refill: Capillary refill takes less than 2 seconds. Findings: No erythema. Neurological:      Mental Status: He is alert and oriented to person, place, and time.    Psychiatric:         Mood and Affect: Mood normal.         LABORATORY RESULTS      CBC with diff:   Results from last 7 days   Lab Units 07/10/23  0519 07/09/23  0507 07/08/23  0536 07/07/23  0541 07/1955   WBC Thousand/uL 7.59 9.57 10.11 10.96* 11.08*   HEMOGLOBIN g/dL 10.6* 11.0* 11.3* 11.3* 10.4*   HEMATOCRIT % 32.4* 34.5* 35.2* 35.2* 32.7*   MCV fL 90 92 91 92 92   PLATELETS Thousands/uL 232 207 209 208 202   RBC Million/uL 3.62* 3.76* 3.85* 3.84* 3.56*   MCH pg 29.3 29.3 29.4 29.4 29.2   MCHC g/dL 32.7 31.9 32.1 32.1 31.8   RDW % 12.7 13.1 13.4 13.6 13.4   MPV fL 10.6 10.4 10.3 10.5 10.3   NRBC AUTO /100 WBCs  --   --  0 0 0       CMP:  Results from last 7 days   Lab Units 07/10/23  0519 07/09/23  0507 07/08/23  0536 07/07/23  0541 07/1955   POTASSIUM mmol/L 3.5 3.7 3.8 3.6 5.1   CHLORIDE mmol/L 109* 112* 113* 106 109*   CO2 mmol/L 25 25 24 27 24   BUN mg/dL 15 18 22 25 28*   CREATININE mg/dL 1.22 1.30 1.42* 1.38* 1.62*   CALCIUM mg/dL 8.4 8.5 8.1* 8.5 8.5   AST U/L  --   --   --   --  28   ALT U/L  --   --   --   --  15   ALK PHOS U/L  --   --   --   --  59   EGFR ml/min/1.73sq m 60 55 50 51 42       BMP:  Results from last 7 days   Lab Units 07/10/23  0519 07/09/23  0507 07/08/23  0536 07/07/23  0541 07/1955   POTASSIUM mmol/L 3.5 3.7 3.8 3.6 5.1   CHLORIDE mmol/L 109* 112* 113* 106 109*   CO2 mmol/L 25 25 24 27 24   BUN mg/dL 15 18 22 25 28*   CREATININE mg/dL 1.22 1.30 1.42* 1.38* 1.62*   CALCIUM mg/dL 8.4 8.5 8.1* 8.5 8.5       Lab Results   Component Value Date    CREATININE 1.22 07/10/2023    CREATININE 1.30 07/09/2023    CREATININE 1.42 (H) 07/08/2023       No results found for: "NTBNP"        Results from last 7 days   Lab Units 07/08/23  0536   MAGNESIUM mg/dL 1.9          Results from last 7 days   Lab Units 07/09/23  0507   HEMOGLOBIN A1C % 5.2                    Lipid Profile:   No results found for: "CHOL"  Lab Results   Component Value Date    HDL 28 (L) 07/09/2023     Lab Results   Component Value Date    LDLCALC 40 07/09/2023     Lab Results   Component Value Date    TRIG 107 07/09/2023     Meds/Allergies   all current active meds have been reviewed and current meds:   Current Facility-Administered Medications   Medication Dose Route Frequency   • acetaminophen (TYLENOL) tablet 975 mg  975 mg Oral Q6H PRN   • albuterol (PROVENTIL HFA,VENTOLIN HFA) inhaler 2 puff  2 puff Inhalation Q4H PRN   • aluminum-magnesium hydroxide-simethicone (MAALOX) oral suspension 30 mL  30 mL Oral Q6H PRN   • aspirin chewable tablet 81 mg  81 mg Oral Daily   • atorvastatin (LIPITOR) tablet 80 mg  80 mg Oral HS   • benzonatate (TESSALON PERLES) capsule 100 mg  100 mg Oral TID PRN   • chlorhexidine (PERIDEX) 0.12 % oral rinse 15 mL  15 mL Swish & Spit Q12H [address]   • doxycycline hyclate (VIBRAMYCIN) capsule 100 mg  100 mg Oral Q12H [address]   • ezetimibe (ZETIA) tablet 10 mg  10 mg Oral HS   • fluticasone (FLONASE) 50 mcg/act nasal spray 2 spray  2 spray Nasal Daily PRN   • fluticasone-vilanterol 200-25 mcg/actuation 1 puff  1 puff Inhalation Daily   • guaiFENesin (MUCINEX) 12 hr tablet 600 mg  600 mg Oral Q12H PALLAVI   • heparin (porcine) subcutaneous injection 5,000 Units  5,000 Units Subcutaneous Q8H [address]   • hydrocortisone 1 % cream   Topical BID   • hydrOXYzine HCL (ATARAX) tablet 50 mg  50 mg Oral TID PRN   • isosorbide mononitrate (IMDUR) 24 hr tablet 30 mg  30 mg Oral Daily   • lubiprostone (AMITIZA) capsule 8 mcg  8 mcg Oral BID   • metoprolol succinate (TOPROL-XL) 24 hr tablet 50 mg  50 mg Oral Daily   • mupirocin (BACTROBAN) 2 % nasal ointment   Nasal Q12H PALLAVI   • nitroglycerin (NITROSTAT) SL tablet 0.4 mg  0.4 mg Sublingual Q5 Min PRN   • ondansetron (ZOFRAN) injection 4 mg  4 mg Intravenous Q6H PRN   • oxyCODONE (ROXICODONE) IR tablet 5 mg  5 mg Oral TID   • oxymetazoline (AFRIN) 0.05 % nasal spray 2 spray  2 spray Each Nare Q12H PRN   • pantoprazole (PROTONIX) EC tablet 40 mg  40 mg Oral BID AC   • pramipexole (MIRAPEX) tablet 0.25 mg  0.25 mg Oral After Dinner   • simethicone (MYLICON) chewable tablet 80 mg  80 mg Oral 4x Daily PRN   • temazepam (RESTORIL) capsule 7.5 mg  7.5 mg Oral HS PRN     Medications Prior to Admission   Medication   • Albuterol Sulfate (VENTOLIN HFA IN)   • aspirin 81 mg chewable tablet   • atorvastatin (LIPITOR) 80 mg tablet   • fluticasone (FLONASE) 50 mcg/act nasal spray   • Fluticasone-Salmeterol (Advair Diskus) 250-50 mcg/dose inhaler   • hydrOXYzine HCL (ATARAX) 50 mg tablet   • isosorbide mononitrate (IMDUR) 30 mg 24 hr tablet   • linaCLOtide (Linzess) 72 MCG CAPS   • losartan (COZAAR) 50 mg tablet   • metoprolol succinate (TOPROL-XL) 50 mg 24 hr tablet   • oxyCODONE (OXY-IR) 5 MG capsule   • pantoprazole (PROTONIX) 40 mg tablet   • pramipexole (MIRAPEX) 0.125 mg tablet     Counseling / Coordination of Care  Total floor / unit time spent today 20 minutes. Greater than 50% of total time was spent with the patient and / or family counseling and / or coordination of care. ** Please Note: Dragon 360 Dictation voice to text software may have been used in the creation of this document.  **

## 2023-07-10 NOTE — ASSESSMENT & PLAN NOTE
SLA 7/6 with 1 month progressive exertional chest discomfort  Dec 2021- inf MI s/p EMRRITT to dRCA residual mLAD  Cath Jan 2022 and June 2022  LVH June 2023- nonischemic, 7/7/23 cath- [place] End LAD 90% stenosed   SLB for cardiac surgery CABG eval    VAS carotid <50% stenosis  CT chest- normal caliber aorta, no significant atherosclerotic, no pericardial effusion/adhesion, mild edema  Echo- 50% EF    Rate 58 bpm, however noted to be 40s on Tele      Plan:  ASA 81, atorva 80 mg , toprol 50 mg daily  Monitor tele  Resume Losartan after PCI  Plavix load given  Plan for High risk PCI today  CABG cancelled due to social situation

## 2023-07-10 NOTE — PROGRESS NOTES
INTERNAL MEDICINE RESIDENCY PROGRESS NOTE     Name: Lanita Shone   Age & Sex: 69 y.o. male   MRN: [id number]  Unit/Bed#: [facility] 419-01   Encounter: [id number]  Team: SLIM    PATIENT INFORMATION     Name: Lanita Shone   Age & Sex: 69 y.o. male   MRN: [address] Stay Days: 3    ASSESSMENT/PLAN     Principal Problem:    Coronary artery disease  Active Problems:    Gastroesophageal reflux disease without esophagitis    HLD (hyperlipidemia)    Restless legs    Mild persistent asthma without complication    Uncomplicated opioid dependence ([address])    [place] (hard of hearing)    Fever    Stage 3a chronic kidney disease ([address])    Primary hypertension    Irritable bowel syndrome with constipation      * Coronary artery disease  Assessment & Plan  SLA 7/6 with 1 month progressive exertional chest discomfort  Dec 2021- inf MI s/p MERRITT to dRCA residual mLAD  Cath Jan 2022 and June 2022  LVH June 2023- nonischemic, 7/7/23 cath- [facility] [place] LAD 90% stenosed   SLB for cardiac surgery CABG eval    VAS carotid <50% stenosis  CT chest- normal caliber aorta, no significant atherosclerotic, no pericardial effusion/adhesion, mild edema  Echo- 50% EF    Plan:  ASA 81, atorva 80 mg , toprol 50 mg daily  Plavix and Losartan held  Tele monitoring  Per CTS, plan for CABG Friday 7/14    Irritable bowel syndrome with constipation  Assessment & Plan  IBS-C on [employer]  BM every 4 days   Denies abd pain or diarrhea or constipation at this time      Primary hypertension  Assessment & Plan  -149  Losartan 50 mg HELD given Cr elevation and pending CABG    Continue Toprol 50 mg daily      Stage 3a chronic kidney disease [facility])  Assessment & Plan  Lab Results   Component Value Date    EGFR 60 07/10/2023    EGFR 55 07/09/2023    EGFR 50 07/08/2023    CREATININE 1.22 07/10/2023    CREATININE 1.30 07/09/2023    CREATININE 1.42 (H) 07/08/2023     Baseline 1.1-1.3  Improved and back to baseline  Losartan held  Avoid nephrotoxins and hypotension    Fever  Assessment & Plan  • Intermittent fever  • A few days ago patient had a right ear pressure, right-sided sinus pain/pressure, later developed postnasal drip and productive cough  • Suspect sinus infection, viral upper respiratory tract  infection  • Pro-Ramez -negative x2  • Chest x-ray 7/6 negative  • COVID/flu/RSV negative  • Blood cultures negative at 24 hours  • CT chest showed mild edema, minimal dependent atelectasis in the lower lobes, no consolidation  • Afrin significantly improved his symptoms, relieving sinus pressure  • Continue doxycycline for possible sinus infection, he has penicillin allergy and not sure if he ever had cephalosporins  •     Currently afebrile for 24 hrs without leukocytosis    Day 3 out of 5 doxycycline  • Mucinex, Tessalon Perles  • Incentive spirometry  • Continue Afrin    [place] (hard of hearing)  Assessment & Plan  Noted    Uncomplicated opioid dependence (HCC)  Assessment & Plan  Verified on PDMP  Continue oxy IR 5 mg TID      Mild persistent asthma without complication  Assessment & Plan  Continue breo with PRN albuterol    Restless legs  Assessment & Plan  Continue Pramipexole 0.25 mg daily    HLD (hyperlipidemia)  Assessment & Plan  On Atorvastatin 80 mg daily  Zetia 10 mg daily    Gastroesophageal reflux disease without esophagitis  Assessment & Plan  Continue Protonix 40 mg BID        Disposition: pending CABG     SUBJECTIVE     Patient seen and examined. No acute events overnight. Patient admits to chest pain only on exertion with walking. Some cough and congestion present but overall improved. Denies SOB, abd pain, N/V, dysuria. Patient did NOT want family updated regarding plan of care.      OBJECTIVE     Vitals:    07/10/23 0805 07/10/23 0847 07/10/23 1342 07/10/23 1508   BP:  149/68 131/65 134/65   BP Location:       Pulse:  67 56 59   Resp:   18 15   Temp:  98.6 °F (37 °C) 98.3 °F (36.8 °C) 98.6 °F (37 °C)   TempSrc:       SpO2: 96% 96% 97% 97% Weight:       Height:          Temperature:   Temp (24hrs), Av.5 °F (36.9 °C), Min:98.3 °F (36.8 °C), Max:98.6 °F (37 °C)    Temperature: 98.6 °F (37 °C)  Intake & Output:  I/O        07 07 0701  07/10 0700 07/10 07 0700    P. O. 348 445 221    I.V. (mL/kg)       Total Intake(mL/kg) 348 (4.2) 445 (5.4) 221 (2.7)    Net +348 +445 +221               Weights:   IBW (Ideal Body Weight): 77.6 kg    Body mass index is 24.85 kg/m². Weight (last 2 days)     Date/Time Weight    23 1400 83.1 (183.2)        Physical Exam  Vitals reviewed. Constitutional:       General: He is not in acute distress. HENT:      Head: Normocephalic and atraumatic. Mouth/Throat:      Pharynx: Oropharynx is clear. Eyes:      Extraocular Movements: Extraocular movements intact. Conjunctiva/sclera: Conjunctivae normal.   Cardiovascular:      Rate and Rhythm: Normal rate and regular rhythm. Pulses: Normal pulses. Heart sounds: Normal heart sounds. Pulmonary:      Effort: Pulmonary effort is normal.      Breath sounds: Normal breath sounds. Abdominal:      General: There is no distension. Palpations: Abdomen is soft. Tenderness: There is no abdominal tenderness. Musculoskeletal:         General: Normal range of motion. Right lower leg: No edema. Left lower leg: No edema. Neurological:      Mental Status: He is alert and oriented to person, place, and time. LABORATORY DATA     Labs: I have personally reviewed pertinent reports.   Results from last 7 days   Lab Units 07/10/23  0519 23  0507 23  0536 23  0541 23  1953   WBC Thousand/uL 7.59 9.57 10.11 10.96* 11.08*   HEMOGLOBIN g/dL 10.6* 11.0* 11.3* 11.3* 10.4*   HEMATOCRIT % 32.4* 34.5* 35.2* 35.2* 32.7*   PLATELETS Thousands/uL 232 207 209 208 202   NEUTROS PCT %  --   --  66 60 62   MONOS PCT %  --   --  10 9 10   EOS PCT %  --   --  2 3 3      Results from last 7 days   Lab Units 07/10/23  0519 07/09/23  0507 07/08/23  0536 07/07/23  0541 07/1953   POTASSIUM mmol/L 3.5 3.7 3.8   < > 5.1   CHLORIDE mmol/L 109* 112* 113*   < > 109*   CO2 mmol/L 25 25 24   < > 24   BUN mg/dL 15 18 22   < > 28*   CREATININE mg/dL 1.22 1.30 1.42*   < > 1.62*   CALCIUM mg/dL 8.4 8.5 8.1*   < > 8.5   ALK PHOS U/L  --   --   --   --  59   ALT U/L  --   --   --   --  15   AST U/L  --   --   --   --  28    < > = values in this interval not displayed. Results from last 7 days   Lab Units 07/08/23  0536   MAGNESIUM mg/dL 1.9                        IMAGING & DIAGNOSTIC TESTING     Radiology Results: I have personally reviewed pertinent reports. CT chest wo contrast    Result Date: 7/8/2023  Impression: Normal caliber ascending aorta with no significant atherosclerotic calcification. No pericardial effusion or adhesion. Mild edema. Workstation performed: [id number]     Other Diagnostic Testing: I have personally reviewed pertinent reports.     ACTIVE MEDICATIONS     Current Facility-Administered Medications   Medication Dose Route Frequency   • acetaminophen (TYLENOL) tablet 975 mg  975 mg Oral Q6H PRN   • albuterol (PROVENTIL HFA,VENTOLIN HFA) inhaler 2 puff  2 puff Inhalation Q4H PRN   • aluminum-magnesium hydroxide-simethicone (MAALOX) oral suspension 30 mL  30 mL Oral Q6H PRN   • aspirin chewable tablet 81 mg  81 mg Oral Daily   • atorvastatin (LIPITOR) tablet 80 mg  80 mg Oral HS   • benzonatate (TESSALON PERLES) capsule 100 mg  100 mg Oral TID PRN   • chlorhexidine (PERIDEX) 0.12 % oral rinse 15 mL  15 mL Swish [employer] Spit Q12H [address]   • doxycycline hyclate (VIBRAMYCIN) capsule 100 mg  100 mg Oral Q12H [address]   • ezetimibe (ZETIA) tablet 10 mg  10 mg Oral HS   • fluticasone (FLONASE) 50 mcg/act nasal spray 2 spray  2 spray Nasal Daily PRN   • fluticasone-vilanterol 200-25 mcg/actuation 1 puff  1 puff Inhalation Daily   • guaiFENesin (MUCINEX) 12 hr tablet 600 mg  600 mg Oral Q12H PALLAVI   • heparin (porcine) subcutaneous injection 5,000 Units  5,000 Units Subcutaneous Q8H [facility]   • hydrocortisone 1 % cream   Topical BID   • hydrOXYzine HCL (ATARAX) tablet 50 mg  50 mg Oral TID PRN   • isosorbide mononitrate (IMDUR) 24 hr tablet 30 mg  30 mg Oral Daily   • lubiprostone (AMITIZA) capsule 8 mcg  8 mcg Oral BID   • metoprolol succinate (TOPROL-XL) 24 hr tablet 50 mg  50 mg Oral Daily   • mupirocin (BACTROBAN) 2 % nasal ointment   Nasal Q12H PALLAVI   • nitroglycerin (NITROSTAT) SL tablet 0.4 mg  0.4 mg Sublingual Q5 Min PRN   • ondansetron (ZOFRAN) injection 4 mg  4 mg Intravenous Q6H PRN   • oxyCODONE (ROXICODONE) IR tablet 5 mg  5 mg Oral TID   • pantoprazole (PROTONIX) EC tablet 40 mg  40 mg Oral BID AC   • pramipexole (MIRAPEX) tablet 0.25 mg  0.25 mg Oral After Dinner   • simethicone (MYLICON) chewable tablet 80 mg  80 mg Oral 4x Daily PRN   • temazepam (RESTORIL) capsule 7.5 mg  7.5 mg Oral HS PRN       VTE Pharmacologic Prophylaxis: Heparin  VTE Mechanical Prophylaxis: sequential compression device    Portions of the record may have been created with voice recognition software. Occasional wrong word or "sound a like" substitutions may have occurred due to the inherent limitations of voice recognition software.   Read the chart carefully and recognize, using context, where substitutions have occurred.  ==  Delilah Mcburney, MD  [address] [facility]  Internal Medicine Residency PGY-2

## 2023-07-10 NOTE — PROGRESS NOTES
Progress Note - Cardiothoracic Surgery   Sae Bella 69 y.o. male MRN: [id number]  Unit/Bed#: [facility] 419-01 Encounter: [id number]      24 Hour Events: no events, consent obtained     Medications:   Scheduled Meds:  Current Facility-Administered Medications   Medication Dose Route Frequency Provider Last Rate   • acetaminophen  975 mg Oral Q6H PRN Maria Victoria Whelan MD     • albuterol  2 puff Inhalation Q4H PRN Maria Victoria Whelan MD     • aluminum-magnesium hydroxide-simethicone  30 mL Oral Q6H PRN Maria Victoria Whelan MD     • aspirin  81 mg Oral Daily Maria Victoria Whelan MD     • atorvastatin  80 mg Oral HS Maria Victoria Whelan MD     • benzonatate  100 mg Oral TID PRN Arnaldo Castillo MD     • chlorhexidine  15 mL Swish & Spit Q12H [facility] Dayanna Mitchell PA-C     • doxycycline hyclate  100 mg Oral Q12H Jackie Baca MD     • ezetimibe  10 mg Oral HS Maria Victoria Whelan MD     • fluticasone  2 spray Nasal Daily PRN Maria Victoria Whelan MD     • fluticasone-vilanterol  1 puff Inhalation Daily Maria Victoria Whelan MD     • guaiFENesin  600 mg Oral Q12H Jackie Baca MD     • heparin (porcine)  5,000 Units Subcutaneous [facility] Maria Victoria Whelan MD     • hydrocortisone   Topical BID Maria Victoria Whelan MD     • hydrOXYzine HCL  50 mg Oral TID PRN Maria Victoria Whelan MD     • isosorbide mononitrate  30 mg Oral Daily Maria Victoria Whelan MD     • lubiprostone  8 mcg Oral BID Maria Victoria Whelan MD     • metoprolol succinate  50 mg Oral Daily Maria Victoria Whelan MD     • mupirocin   Nasal Q12H [facility] Dayanna Mitchell PA-C     • nitroglycerin  0.4 mg Sublingual Q5 Min PRN Maria Victoria Whelan MD     • ondansetron  4 mg Intravenous Q6H PRN Maria Victoria Whelan MD     • oxyCODONE  5 mg Oral TID Arnaldo Castillo MD     • oxymetazoline  2 spray Each Nare Q12H PRN Maria Victoria Whelan MD     • pantoprazole  40 mg Oral BID AC Maria Victoria Whelan MD     • pramipexole  0.25 mg Oral After Darinel Brewer MD     • simethicone  80 mg Oral 4x Daily PRN Zack Prieto MD     • temazepam  7.5 mg Oral HS PRN Zack Prieto MD       Continuous Infusions:     Results:   Results from last 7 days   Lab Units 07/10/23  0519 07/09/23  0507 07/08/23  0536   WBC Thousand/uL 7.59 9.57 10.11   HEMOGLOBIN g/dL 10.6* 11.0* 11.3*   HEMATOCRIT % 32.4* 34.5* 35.2*   PLATELETS Thousands/uL 232 207 209     Results from last 7 days   Lab Units 07/10/23  0519 07/09/23  0507 07/08/23  0536   POTASSIUM mmol/L 3.5 3.7 3.8   CHLORIDE mmol/L 109* 112* 113*   CO2 mmol/L 25 25 24   BUN mg/dL 15 18 22   CREATININE mg/dL 1.22 1.30 1.42*   CALCIUM mg/dL 8.4 8.5 8.1*           Studies:     Cardiac Catheterization: 7/7  Left Anterior Descending   Ost LAD lesion is 90% stenosed. Culprit for clinical presentation. TARAS flow is 3. Previously placed Mid LAD stent of unknown type is widely patent. Not the culprit lesion. TARAS flow is 3. Left Circumflex   There is moderate diffuse disease throughout the vessel. Ost Cx lesion is 50% stenosed. Previously placed Ost Cx to Prox Cx stent of unknown type is widely patent. Not the culprit lesion. TARAS flow is 3. Right Coronary Artery   There is mild diffuse disease throughout the vessel. Previously placed Mid RCA to Dist RCA stent of unknown type is widely patent. Not the culprit lesion. TARAS flow is 3.               Echocardiogram: 7/8     •  Left Ventricle: Left ventricular cavity size is normal. Wall thickness is normal. The left ventricular ejection fraction is 50%. Systolic function is low normal. Wall motion is normal. Diastolic function is normal for age. •  Right Ventricle: Right ventricular cavity size is normal. Systolic function is normal.  •  Mitral Valve: There is mild regurgitation. •  Tricuspid Valve: There is mild regurgitation.     Findings    Left Ventricle Left ventricular cavity size is normal. Wall thickness is normal. The left ventricular ejection fraction is 50%.  Systolic function is low normal.  Wall motion is normal. Diastolic function is normal for age. Right Ventricle Right ventricular cavity size is normal. Systolic function is normal. Wall thickness is normal.      Left Atrium The atrium is normal in size. Right Atrium The atrium is normal in size. Aortic Valve The aortic valve is trileaflet. The leaflets are not thickened. The leaflets are not calcified. The leaflets exhibit normal mobility. There is no evidence of regurgitation. The aortic valve has no significant stenosis. Mitral Valve Mitral valve structure is normal. There is mild regurgitation. There is no evidence of stenosis. Tricuspid Valve Tricuspid valve structure is normal. There is mild regurgitation. There is no evidence of stenosis. Pulmonic Valve Pulmonic valve structure is normal. There is trace regurgitation. There is no evidence of stenosis. Ascending Aorta The aortic root is normal in size. IVC/SVC The inferior vena cava is normal in size. Pericardium There is no pericardial effusion. The pericardium is normal in appearance.         Left Ventricle Measurements    Function/Volumes   A4C EF 50 %         Dimensions   LVIDd 4.7 cm         LVIDS 3.5 cm         IVSd 1.1 cm         LVPWd 1 cm         FS 26          Diastolic Filling   MV E' Tissue Velocity Septal 10 cm/s         E wave deceleration time 160 ms         MV Peak E Yemi 86 cm/s         MV Peak A Yemi 0.97 m/s               Right Ventricle Measurements    Dimensions   RVID d 3.1 cm         Tricuspid annular plane systolic excursion 2.4 cm               Left Atrium Measurements    Dimensions   LA size 3.8 cm         LA length (A2C) 5.6 cm         Volumes   LA volume (BP) 51 mL               Right Atrium Measurements    Dimensions   RAA A4C 16.7 cm2               Mitral Valve Measurements    Stenosis   MV stenosis pressure 1/2 time 46 ms         MV valve area p 1/2 method 4.78                Tricuspid Valve Measurements    RVSP Parameters   TR Peak Yemi 2.6 m/s         Triscuspid Valve Regurgitation Peak Gradient 28 mmHg               Aorta Measurements    Aortic Dimensions   Ao root 3.6 cm         Asc Ao 3.1 cm               Carotid artery duplex:   < 50% right carotid stenosis. Vertebral artery flow is antegrade and There is no significant subclavian artery   < 50% left carotid stenosis. Vertebral artery flow is antegrade and There is no significant subclavian artery    CT Chest: 7/8  FINDINGS:     LUNGS: Mild septal thickening and groundglass opacity due to edema. Minimal dependent atelectasis in the lower lobes.     AIRWAYS: No significant filling defects.     PLEURA:  Unremarkable.     HEART/GREAT VESSELS: Mild cardiomegaly. Moderate coronary artery calcification indicating atherosclerotic heart disease. Normal caliber ascending aorta with no calcification. No pericardial effusion or adhesion.     MEDIASTINUM AND MELITON:  Unremarkable.     CHEST WALL AND LOWER NECK: Unremarkable.     UPPER ABDOMEN: Vicarious excretion of contrast in the gallbladder.     OSSEOUS STRUCTURES: Mild degenerative disease in the spine.     IMPRESSION:     Normal caliber ascending aorta with no significant atherosclerotic calcification.     No pericardial effusion or adhesion.     Mild edema. Vitals:   Vitals:    07/09/23 2229 07/10/23 0222 07/10/23 0805 07/10/23 0847   BP: (!) 132/49 147/71  149/68   BP Location: Right arm Right arm     Pulse: 61 62  67   Resp: 16 18     Temp: 98.5 °F (36.9 °C) 98.6 °F (37 °C)  98.6 °F (37 °C)   TempSrc: Oral Oral     SpO2: 96% 97% 96% 96%   Weight:       Height:           Physical Exam:    HEENT/NECK:  Normocephalic. Atraumatic. Cardiac: Regular rate and rhythm  Pulmonary:  Breath sounds clear bilaterally  Neuro: Alert and oriented X 3, Sensation is grossly intact and No focal deficits      Assessment:    Severe coronary artery disease; Ongoing CABG workup    Plan:  Patient agreeable to proceed with surgery;  Informed consent signed    Carotid ultrasound completed, and acceptable    CT Chest ok    Blood type and cross match ordered for 7/13    Continue Mupirocin 2% nasal ointment q 12 hrs    Continue topical chlorhexidine bath and mouth rise    CABGx1 with LIMA to LAD scheduled for 7/14 with Dr. Maximino Marcus M.D.    Andrew Modi: [place]  DATE: July 10, 2023  TIME: 9:01 AM    * This note was completed in part utilizing [employer] direct voice recognition software. Grammatical errors, random word insertion, spelling mistakes, and incomplete sentences may be an occasional consequence of the system secondary to software limitations, ambient noise and hardware issues. At the time of dictation, efforts were made to edit, clarify and /or correct errors. Please read the chart carefully and recognize, using context, where substitutions have occurred. If you have any questions or concerns about the context, text or information contained within the body of this dictation, please contact myself, the provider, for further clarification.

## 2023-07-10 NOTE — UTILIZATION REVIEW
Notification of Unplanned, Urgent, or   Emergency Inpatient Admission   [address]  [facility]  Tax ID: [id number]  NPI: [id number]  Place of Service: [address]  Admission Level of Care: Inpatient  Place of Service Code: 24     Attending Physician Information  Attending Name and NPI#Ken [place], [address] [[id number]]  Phone: [phone]     Admission Information  Inpatient Admission Date/Time: N/A N/A  Discharge Date/Time: 7/7/2023 10:00 PM  Admitting Diagnosis Code/Description:  Chest pain [R07.9]     Utilization Review Contact  Renetta Baca Utilization   Phone: [phone]  Fax: [phone]  Email: [email or web address]  Contact for approvals/pending authorizations, clinical reviews, and discharge. Physician Advisory Services Contact  [facility]cess[facility] Discussion  Phone: [phone]  Fax: [phone]  Email: [email or web address]

## 2023-07-11 LAB
ANION GAP SERPL CALCULATED.3IONS-SCNC: 3 MMOL/L
BUN SERPL-MCNC: 15 MG/DL (ref 5–25)
CALCIUM SERPL-MCNC: 8.6 MG/DL (ref 8.3–10.1)
CHLORIDE SERPL-SCNC: 112 MMOL/L (ref 96–108)
CO2 SERPL-SCNC: 27 MMOL/L (ref 21–32)
CREAT SERPL-MCNC: 1.24 MG/DL (ref 0.6–1.3)
GFR SERPL CREATININE-BSD FRML MDRD: 58 ML/MIN/1.73SQ M
GLUCOSE SERPL-MCNC: 107 MG/DL (ref 65–140)
POTASSIUM SERPL-SCNC: 3.8 MMOL/L (ref 3.5–5.3)
SODIUM SERPL-SCNC: 142 MMOL/L (ref 135–147)

## 2023-07-11 PROCEDURE — 99233 SBSQ HOSP IP/OBS HIGH 50: CPT | Performed by: INTERNAL MEDICINE

## 2023-07-11 PROCEDURE — 99232 SBSQ HOSP IP/OBS MODERATE 35: CPT | Performed by: INTERNAL MEDICINE

## 2023-07-11 PROCEDURE — 80048 BASIC METABOLIC PNL TOTAL CA: CPT | Performed by: STUDENT IN AN ORGANIZED HEALTH CARE EDUCATION/TRAINING PROGRAM

## 2023-07-11 PROCEDURE — 87081 CULTURE SCREEN ONLY: CPT | Performed by: PHYSICIAN ASSISTANT

## 2023-07-11 RX ADMIN — HEPARIN SODIUM 5000 UNITS: 5000 INJECTION INTRAVENOUS; SUBCUTANEOUS at 14:07

## 2023-07-11 RX ADMIN — HEPARIN SODIUM 5000 UNITS: 5000 INJECTION INTRAVENOUS; SUBCUTANEOUS at 06:03

## 2023-07-11 RX ADMIN — BENZONATATE 100 MG: 100 CAPSULE ORAL at 17:14

## 2023-07-11 RX ADMIN — METOPROLOL SUCCINATE 50 MG: 50 TABLET, EXTENDED RELEASE ORAL at 08:58

## 2023-07-11 RX ADMIN — DOXYCYCLINE 100 MG: 100 CAPSULE ORAL at 21:23

## 2023-07-11 RX ADMIN — LUBIPROSTONE 8 MCG: 8 CAPSULE, GELATIN COATED ORAL at 08:58

## 2023-07-11 RX ADMIN — EZETIMIBE 10 MG: 10 TABLET ORAL at 21:23

## 2023-07-11 RX ADMIN — DOXYCYCLINE 100 MG: 100 CAPSULE ORAL at 08:58

## 2023-07-11 RX ADMIN — MUPIROCIN 1 APPLICATION: 20 OINTMENT TOPICAL at 08:58

## 2023-07-11 RX ADMIN — PANTOPRAZOLE SODIUM 40 MG: 40 TABLET, DELAYED RELEASE ORAL at 15:48

## 2023-07-11 RX ADMIN — GUAIFENESIN 600 MG: 600 TABLET, EXTENDED RELEASE ORAL at 08:58

## 2023-07-11 RX ADMIN — HYDROCORTISONE: 1 CREAM TOPICAL at 08:59

## 2023-07-11 RX ADMIN — TEMAZEPAM 15 MG: 15 CAPSULE ORAL at 21:36

## 2023-07-11 RX ADMIN — FLUTICASONE FUROATE AND VILANTEROL TRIFENATATE 1 PUFF: 200; 25 POWDER RESPIRATORY (INHALATION) at 08:58

## 2023-07-11 RX ADMIN — OXYCODONE HYDROCHLORIDE 5 MG: 5 TABLET ORAL at 17:11

## 2023-07-11 RX ADMIN — BENZONATATE 100 MG: 100 CAPSULE ORAL at 06:08

## 2023-07-11 RX ADMIN — ATORVASTATIN CALCIUM 80 MG: 80 TABLET, FILM COATED ORAL at 21:23

## 2023-07-11 RX ADMIN — MUPIROCIN 1 APPLICATION: 20 OINTMENT TOPICAL at 21:23

## 2023-07-11 RX ADMIN — PANTOPRAZOLE SODIUM 40 MG: 40 TABLET, DELAYED RELEASE ORAL at 06:03

## 2023-07-11 RX ADMIN — OXYCODONE HYDROCHLORIDE 5 MG: 5 TABLET ORAL at 11:04

## 2023-07-11 RX ADMIN — GUAIFENESIN 600 MG: 600 TABLET, EXTENDED RELEASE ORAL at 21:23

## 2023-07-11 RX ADMIN — PRAMIPEXOLE DIHYDROCHLORIDE 0.25 MG: 0.25 TABLET ORAL at 17:11

## 2023-07-11 RX ADMIN — HYDROCORTISONE: 1 CREAM TOPICAL at 17:11

## 2023-07-11 RX ADMIN — ASPIRIN 81 MG CHEWABLE TABLET 81 MG: 81 TABLET CHEWABLE at 08:58

## 2023-07-11 RX ADMIN — LUBIPROSTONE 8 MCG: 8 CAPSULE, GELATIN COATED ORAL at 21:24

## 2023-07-11 RX ADMIN — ISOSORBIDE MONONITRATE 30 MG: 30 TABLET, EXTENDED RELEASE ORAL at 08:58

## 2023-07-11 RX ADMIN — HEPARIN SODIUM 5000 UNITS: 5000 INJECTION INTRAVENOUS; SUBCUTANEOUS at 21:23

## 2023-07-11 RX ADMIN — OXYCODONE HYDROCHLORIDE 5 MG: 5 TABLET ORAL at 06:03

## 2023-07-11 NOTE — PROGRESS NOTES
INTERNAL MEDICINE RESIDENCY PROGRESS NOTE     Name: Gabriele Coyle   Age & Sex: 69 y.o. male   MRN: [id number]  Unit/Bed#: [facility] 419-01   Encounter: [id number]  Team: SLIM    PATIENT INFORMATION     Name: Gabriele Coyle   Age & Sex: 69 y.o. male   MRN: [address] Stay Days: 4    ASSESSMENT/PLAN     Principal Problem:    Coronary artery disease  Active Problems:    Gastroesophageal reflux disease without esophagitis    HLD (hyperlipidemia)    Restless legs    Mild persistent asthma without complication    Uncomplicated opioid dependence ([address])    [place] (hard of hearing)    Fever    Stage 3a chronic kidney disease ([address])    Primary hypertension    Irritable bowel syndrome with constipation      * Coronary artery disease  Assessment & Plan  SLA 7/6 with 1 month progressive exertional chest discomfort  Dec 2021- inf MI s/p MERRITT to dRCA residual mLAD  Cath Jan 2022 and June 2022  LVH June 2023- nonischemic, 7/7/23 cath- Manual [place] LAD 90% stenosed   SLB for cardiac surgery CABG eval    VAS carotid <50% stenosis  CT chest- normal caliber aorta, no significant atherosclerotic, no pericardial effusion/adhesion, mild edema  Echo- 50% EF    Plan:  ASA 81, atorva 80 mg , toprol 50 mg daily  Plavix and Losartan held  Tele monitoring  Per CTS, plan for CABG Friday 7/14    Irritable bowel syndrome with constipation  Assessment & Plan  IBS-C on Linzess  BM every 4 days   Denies abd pain or diarrhea or constipation at this time      Primary hypertension  Assessment & Plan  -149  Losartan 50 mg HELD given Cr elevation and pending CABG    Continue Toprol 50 mg daily      Stage 3a chronic kidney disease [facility])  Assessment & Plan  Lab Results   Component Value Date    EGFR 58 07/11/2023    EGFR 60 07/10/2023    EGFR 55 07/09/2023    CREATININE 1.24 07/11/2023    CREATININE 1.22 07/10/2023    CREATININE 1.30 07/09/2023     Baseline 1.1-1.3  Improved and back to baseline  Losartan held  Avoid nephrotoxins and hypotension    Fever  Assessment & Plan  • Intermittent fever  • A few days ago patient had a right ear pressure, right-sided sinus pain/pressure, later developed postnasal drip and productive cough  • Suspect sinus infection, viral upper respiratory tract  infection  • Pro-Ramez -negative x2  • Chest x-ray  negative  • COVID/flu/RSV negative  • Blood cultures negative at 24 hours  • CT chest showed mild edema, minimal dependent atelectasis in the lower lobes, no consolidation  • Afrin significantly improved his symptoms, relieving sinus pressure  • Continue doxycycline for possible sinus infection, he has penicillin allergy and not sure if he ever had cephalosporins  •     Currently afebrile for 24 hrs without leukocytosis    Day 4 out of 5 doxycycline  • Mucinex, Tessalon Perles  • Incentive spirometry  • Continue Afrin    [place] (hard of hearing)  Assessment & Plan  Noted    Uncomplicated opioid dependence (HCC)  Assessment & Plan  Verified on PDMP  Continue oxy IR 5 mg TID      Mild persistent asthma without complication  Assessment & Plan  Continue breo with PRN albuterol    Restless legs  Assessment & Plan  Continue Pramipexole 0.25 mg daily    HLD (hyperlipidemia)  Assessment & Plan  On Atorvastatin 80 mg daily  Zetia 10 mg daily    Gastroesophageal reflux disease without esophagitis  Assessment & Plan  Continue Protonix 40 mg BID        Disposition: Pending CABG Friday     SUBJECTIVE     Patient seen and examined. No acute events overnight. Comfortable and ambulatory, denies chest pain or SOB.        OBJECTIVE     Vitals:    23 0240 23 0654 23 0800 23 1111   BP: (!) 123/44 144/72  112/61   BP Location:       Pulse: 56 62  57   Resp: 16 17  16   Temp: 98.1 °F (36.7 °C) 98.2 °F (36.8 °C)  98.3 °F (36.8 °C)   TempSrc:       SpO2: 96% 97% 97% 98%   Weight:       Height:          Temperature:   Temp (24hrs), Av.3 °F (36.8 °C), Min:98.1 °F (36.7 °C), Max:98.6 °F (37 °C)    Temperature: 98.3 °F (36.8 °C)  Intake & Output:  I/O       07/09 0701  07/10 0700 07/10 0701 07/11 0700 07/11 0701  07/12 0700    P. O. 445 321 224    Total Intake(mL/kg) 445 (5.4) 321 (3.9) 224 (2.7)    Urine (mL/kg/hr)   200 (0.3)    Total Output   200    Net +445 +321 +24               Weights:   IBW (Ideal Body Weight): 77.6 kg    Body mass index is 24.85 kg/m². Weight (last 2 days)     None        Physical Exam  Vitals reviewed. Constitutional:       General: He is not in acute distress. HENT:      Head: Normocephalic and atraumatic. Mouth/Throat:      Pharynx: Oropharynx is clear. Eyes:      Conjunctiva/sclera: Conjunctivae normal.   Cardiovascular:      Rate and Rhythm: Normal rate and regular rhythm. Pulses: Normal pulses. Heart sounds: Normal heart sounds. Pulmonary:      Effort: Pulmonary effort is normal.      Breath sounds: Normal breath sounds. Abdominal:      Palpations: Abdomen is soft. Tenderness: There is no abdominal tenderness. Musculoskeletal:         General: Normal range of motion. Right lower leg: No edema. Left lower leg: No edema. Neurological:      Mental Status: He is alert and oriented to person, place, and time. LABORATORY DATA     Labs: I have personally reviewed pertinent reports.   Results from last 7 days   Lab Units 07/10/23  0519 07/09/23  0507 07/08/23  0536 07/07/23  0541 07/1953   WBC Thousand/uL 7.59 9.57 10.11 10.96* 11.08*   HEMOGLOBIN g/dL 10.6* 11.0* 11.3* 11.3* 10.4*   HEMATOCRIT % 32.4* 34.5* 35.2* 35.2* 32.7*   PLATELETS Thousands/uL 232 207 209 208 202   NEUTROS PCT %  --   --  66 60 62   MONOS PCT %  --   --  10 9 10   EOS PCT %  --   --  2 3 3      Results from last 7 days   Lab Units 07/11/23  0441 07/10/23  0519 07/09/23  0507 07/07/23  0541 07/1955   POTASSIUM mmol/L 3.8 3.5 3.7   < > 5.1   CHLORIDE mmol/L 112* 109* 112*   < > 109*   CO2 mmol/L 27 25 25   < > 24   BUN mg/dL 15 15 18   < > 28*   CREATININE mg/dL 1. 24 1.22 1.30   < > 1.62*   CALCIUM mg/dL 8.6 8.4 8.5   < > 8.5   ALK PHOS U/L  --   --   --   --  59   ALT U/L  --   --   --   --  15   AST U/L  --   --   --   --  28    < > = values in this interval not displayed. Results from last 7 days   Lab Units 07/08/23  0536   MAGNESIUM mg/dL 1.9                        IMAGING & DIAGNOSTIC TESTING     Radiology Results: I have personally reviewed pertinent reports. CT chest wo contrast    Result Date: 7/8/2023  Impression: Normal caliber ascending aorta with no significant atherosclerotic calcification. No pericardial effusion or adhesion. Mild edema. Workstation performed: [id number]     Other Diagnostic Testing: I have personally reviewed pertinent reports.     ACTIVE MEDICATIONS     Current Facility-Administered Medications   Medication Dose Route Frequency   • acetaminophen (TYLENOL) tablet 975 mg  975 mg Oral Q6H PRN   • albuterol (PROVENTIL HFA,VENTOLIN HFA) inhaler 2 puff  2 puff Inhalation Q4H PRN   • aluminum-magnesium hydroxide-simethicone (MAALOX) oral suspension 30 mL  30 mL Oral Q6H PRN   • aspirin chewable tablet 81 mg  81 mg Oral Daily   • atorvastatin (LIPITOR) tablet 80 mg  80 mg Oral HS   • benzonatate (TESSALON PERLES) capsule 100 mg  100 mg Oral TID PRN   • chlorhexidine (PERIDEX) 0.12 % oral rinse 15 mL  15 mL Swish & Spit Q12H [address]   • doxycycline hyclate (VIBRAMYCIN) capsule 100 mg  100 mg Oral Q12H [address]   • ezetimibe (ZETIA) tablet 10 mg  10 mg Oral HS   • fluticasone (FLONASE) 50 mcg/act nasal spray 2 spray  2 spray Nasal Daily PRN   • fluticasone-vilanterol 200-25 mcg/actuation 1 puff  1 puff Inhalation Daily   • guaiFENesin (MUCINEX) 12 hr tablet 600 mg  600 mg Oral Q12H PALLAVI   • heparin (porcine) subcutaneous injection 5,000 Units  5,000 Units Subcutaneous Q8H [address]   • hydrocortisone 1 % cream   Topical BID   • hydrOXYzine HCL (ATARAX) tablet 50 mg  50 mg Oral TID PRN   • isosorbide mononitrate (IMDUR) 24 hr tablet 30 mg  30 mg Oral Daily   • lubiprostone (AMITIZA) capsule 8 mcg  8 mcg Oral BID   • metoprolol succinate (TOPROL-XL) 24 hr tablet 50 mg  50 mg Oral Daily   • mupirocin (BACTROBAN) 2 % nasal ointment   Nasal Q12H PALLAVI   • nitroglycerin (NITROSTAT) SL tablet 0.4 mg  0.4 mg Sublingual Q5 Min PRN   • ondansetron (ZOFRAN) injection 4 mg  4 mg Intravenous Q6H PRN   • oxyCODONE (ROXICODONE) IR tablet 5 mg  5 mg Oral TID   • pantoprazole (PROTONIX) EC tablet 40 mg  40 mg Oral BID AC   • pramipexole (MIRAPEX) tablet 0.25 mg  0.25 mg Oral After Dinner   • simethicone (MYLICON) chewable tablet 80 mg  80 mg Oral 4x Daily PRN   • temazepam (RESTORIL) capsule 15 mg  15 mg Oral HS PRN       VTE Pharmacologic Prophylaxis: Heparin  VTE Mechanical Prophylaxis: sequential compression device    Portions of the record may have been created with voice recognition software. Occasional wrong word or "sound a like" substitutions may have occurred due to the inherent limitations of voice recognition software.   Read the chart carefully and recognize, using context, where substitutions have occurred.  ==  Terell Benitez MD  [id number]  Internal Medicine Residency PGY-2

## 2023-07-11 NOTE — PROGRESS NOTES
General Cardiology   Progress Note -  Team One   Ifeanyi Hillman 69 y.o. male MRN: [id number]  Unit/Bed#: [facility] 419-01 Encounter: [id number]    Assessment     1.  Coronary artery disease with hx of inferior MI 2021 s/p PCI dRCA, PCI of mLAD 01/2022, PCI of LCx and OM1 06/2022  Presented with exertional chest pain, SOB, fatigue and weakness  Hs troponin negative  Cardiac catheterization 7/7/2023 revealed ostial LAD lesion, 90% stenosis with patent prior stents  Now for CABG later this week- 7/14/23  TTE showed low normal LV function with no specific wall motion normalities. Continued on aspirin, statin, Imdur, beta-blocker. Currently chest pain-free        2.  Fever without leukocytosis  C/o sinus congestion that began a few days prior to admission  COVID/Flu/RSV negative  Symptoms improving with nasal spray and oral antibiotics     3.  Hypertension- 24 hour average /64 on Imdur 30 mg daily and Toprol XL 50 mg daily     4.  Hyperlipidemia- TC 89, , HDL 28, LDL 40 on atorvastatin 80 mg daily     5.  Mild persistent asthma     6.  Opioid dependence     7.  CKD IIIa- baseline creatinine 1.1-1.3; currently at baseline 1.2     Plan  • Continue medical management of CAD while awaiting CABG- ASA, statin, Imdur, and Toprol XL  • Management of URI per primary team    Subjective  Feeling a little better today  Review of Systems   Constitutional: Positive for malaise/fatigue. Negative for chills and weight gain. Cardiovascular: Negative for chest pain, dyspnea on exertion, leg swelling, orthopnea, palpitations and syncope. Respiratory: Positive for cough. Negative for shortness of breath, sleep disturbances due to breathing and sputum production. Gastrointestinal: Negative for bloating, nausea and vomiting. Neurological: Negative for dizziness, light-headedness and weakness. Psychiatric/Behavioral: Negative for altered mental status. All other systems reviewed and are negative.     Objective: Vitals: Blood pressure 144/72, pulse 62, temperature 98.2 °F (36.8 °C), resp. rate 17, height 6' (1.829 m), weight 83.1 kg (183 lb 3.2 oz), SpO2 97 %. , Body mass index is 24.85 kg/m². ,     Systolic (55WCC), HQX:704 , Min:123 , BEL:619     Diastolic (19BED), HWQ:79, Min:44, Max:72    Intake/Output Summary (Last 24 hours) at 7/11/2023 0840  Last data filed at 7/10/2023 1901  Gross per 24 hour   Intake 321 ml   Output --   Net 321 ml     Wt Readings from Last 3 Encounters:   07/08/23 83.1 kg (183 lb 3.2 oz)   07/07/23 83.1 kg (183 lb 4.8 oz)   05/13/21 89.9 kg (198 lb 3.1 oz)     Telemetry Review: NSR with sinus bradycardia at HS    Physical Exam  Vitals reviewed. Constitutional:       General: He is not in acute distress. Neck:      Vascular: No hepatojugular reflux or JVD. Cardiovascular:      Rate and Rhythm: Normal rate and regular rhythm. Pulses: Normal pulses. Heart sounds: Normal heart sounds. No murmur heard. No friction rub. No gallop. Pulmonary:      Effort: Pulmonary effort is normal. No respiratory distress. Breath sounds: No rales. Comments: Room air  Abdominal:      General: Bowel sounds are normal. There is no distension. Palpations: Abdomen is soft. Tenderness: There is no abdominal tenderness. Musculoskeletal:         General: No tenderness. Normal range of motion. Cervical back: Neck supple. Right lower leg: No edema. Left lower leg: No edema. Skin:     General: Skin is warm and dry. Capillary Refill: Capillary refill takes less than 2 seconds. Findings: No erythema. Neurological:      Mental Status: He is alert and oriented to person, place, and time.    Psychiatric:         Mood and Affect: Mood normal.         LABORATORY RESULTS      CBC with diff:   Results from last 7 days   Lab Units 07/10/23  0519 07/09/23  0507 07/08/23  0536 07/07/23  0541 07/06/23  1953   WBC Thousand/uL 7.59 9.57 10.11 10.96* 11.08*   HEMOGLOBIN g/dL 10.6* 11.0* 11.3* 11.3* 10.4*   HEMATOCRIT % 32.4* 34.5* 35.2* 35.2* 32.7*   MCV fL 90 92 91 92 92   PLATELETS Thousands/uL 232 207 209 208 202   RBC Million/uL 3.62* 3.76* 3.85* 3.84* 3.56*   MCH pg 29.3 29.3 29.4 29.4 29.2   MCHC g/dL 32.7 31.9 32.1 32.1 31.8   RDW % 12.7 13.1 13.4 13.6 13.4   MPV fL 10.6 10.4 10.3 10.5 10.3   NRBC AUTO /100 WBCs  --   --  0 0 0       CMP:  Results from last 7 days   Lab Units 07/11/23  0441 07/10/23  0519 07/09/23  0507 07/08/23  0536 07/07/23  0541 07/1953   POTASSIUM mmol/L 3.8 3.5 3.7 3.8 3.6 5.1   CHLORIDE mmol/L 112* 109* 112* 113* 106 109*   CO2 mmol/L 27 25 25 24 27 24   BUN mg/dL 15 15 18 22 25 28*   CREATININE mg/dL 1.24 1.22 1.30 1.42* 1.38* 1.62*   CALCIUM mg/dL 8.6 8.4 8.5 8.1* 8.5 8.5   AST U/L  --   --   --   --   --  28   ALT U/L  --   --   --   --   --  15   ALK PHOS U/L  --   --   --   --   --  59   EGFR ml/min/1.73sq m 58 60 55 50 51 42       BMP:  Results from last 7 days   Lab Units 07/11/23  0441 07/10/23  0519 07/09/23  0507 07/08/23  0536 07/07/23  0541 07/1953   POTASSIUM mmol/L 3.8 3.5 3.7 3.8 3.6 5.1   CHLORIDE mmol/L 112* 109* 112* 113* 106 109*   CO2 mmol/L 27 25 25 24 27 24   BUN mg/dL 15 15 18 22 25 28*   CREATININE mg/dL 1.24 1.22 1.30 1.42* 1.38* 1.62*   CALCIUM mg/dL 8.6 8.4 8.5 8.1* 8.5 8.5       Lab Results   Component Value Date    CREATININE 1.24 07/11/2023    CREATININE 1.22 07/10/2023    CREATININE 1.30 07/09/2023       No results found for: "NTBNP"        Results from last 7 days   Lab Units 07/08/23  0536   MAGNESIUM mg/dL 1.9          Results from last 7 days   Lab Units 07/09/23  0507   HEMOGLOBIN A1C % 5.2                    Lipid Profile:   No results found for: "CHOL"  Lab Results   Component Value Date    HDL 28 (L) 07/09/2023     Lab Results   Component Value Date    LDLCALC 40 07/09/2023     Lab Results   Component Value Date    TRIG 107 07/09/2023     Meds/Allergies   all current active meds have been reviewed and current meds:   Current Facility-Administered Medications   Medication Dose Route Frequency   • acetaminophen (TYLENOL) tablet 975 mg  975 mg Oral Q6H PRN   • albuterol (PROVENTIL HFA,VENTOLIN HFA) inhaler 2 puff  2 puff Inhalation Q4H PRN   • aluminum-magnesium hydroxide-simethicone (MAALOX) oral suspension 30 mL  30 mL Oral Q6H PRN   • aspirin chewable tablet 81 mg  81 mg Oral Daily   • atorvastatin (LIPITOR) tablet 80 mg  80 mg Oral HS   • benzonatate (TESSALON PERLES) capsule 100 mg  100 mg Oral TID PRN   • chlorhexidine (PERIDEX) 0.12 % oral rinse 15 mL  15 mL Swish & Spit Q12H [facility]   • doxycycline hyclate (VIBRAMYCIN) capsule 100 mg  100 mg Oral Q12H [facility]   • ezetimibe (ZETIA) tablet 10 mg  10 mg Oral HS   • fluticasone (FLONASE) 50 mcg/act nasal spray 2 spray  2 spray Nasal Daily PRN   • fluticasone-vilanterol 200-25 mcg/actuation 1 puff  1 puff Inhalation Daily   • guaiFENesin (MUCINEX) 12 hr tablet 600 mg  600 mg Oral Q12H PALLAVI   • heparin (porcine) subcutaneous injection 5,000 Units  5,000 Units Subcutaneous Q8H [facility]   • hydrocortisone 1 % cream   Topical BID   • hydrOXYzine HCL (ATARAX) tablet 50 mg  50 mg Oral TID PRN   • isosorbide mononitrate (IMDUR) 24 hr tablet 30 mg  30 mg Oral Daily   • lubiprostone (AMITIZA) capsule 8 mcg  8 mcg Oral BID   • metoprolol succinate (TOPROL-XL) 24 hr tablet 50 mg  50 mg Oral Daily   • mupirocin (BACTROBAN) 2 % nasal ointment   Nasal Q12H PALLAVI   • nitroglycerin (NITROSTAT) SL tablet 0.4 mg  0.4 mg Sublingual Q5 Min PRN   • ondansetron (ZOFRAN) injection 4 mg  4 mg Intravenous Q6H PRN   • oxyCODONE (ROXICODONE) IR tablet 5 mg  5 mg Oral TID   • pantoprazole (PROTONIX) EC tablet 40 mg  40 mg Oral BID AC   • pramipexole (MIRAPEX) tablet 0.25 mg  0.25 mg Oral After Dinner   • simethicone (MYLICON) chewable tablet 80 mg  80 mg Oral 4x Daily PRN   • temazepam (RESTORIL) capsule 15 mg  15 mg Oral HS PRN     Medications Prior to Admission   Medication   • Albuterol Sulfate (VENTOLIN HFA IN)   • aspirin 81 mg chewable tablet   • atorvastatin (LIPITOR) 80 mg tablet   • fluticasone (FLONASE) 50 mcg/act nasal spray   • Fluticasone-Salmeterol (Advair Diskus) 250-50 mcg/dose inhaler   • hydrOXYzine HCL (ATARAX) 50 mg tablet   • isosorbide mononitrate (IMDUR) 30 mg 24 hr tablet   • linaCLOtide (Linzess) 72 MCG CAPS   • losartan (COZAAR) 50 mg tablet   • metoprolol succinate (TOPROL-XL) 50 mg 24 hr tablet   • oxyCODONE (OXY-IR) 5 MG capsule   • pantoprazole (PROTONIX) 40 mg tablet   • pramipexole (MIRAPEX) 0.125 mg tablet     Counseling / Coordination of Care  Total floor / unit time spent today 20 minutes. Greater than 50% of total time was spent with the patient and / or family counseling and / or coordination of care. ** Please Note: Dragon 360 Dictation voice to text software may have been used in the creation of this document.  **

## 2023-07-12 LAB
ABO GROUP BLD: NORMAL
ANION GAP SERPL CALCULATED.3IONS-SCNC: 2 MMOL/L
BUN SERPL-MCNC: 17 MG/DL (ref 5–25)
CALCIUM SERPL-MCNC: 8.9 MG/DL (ref 8.3–10.1)
CHLORIDE SERPL-SCNC: 111 MMOL/L (ref 96–108)
CO2 SERPL-SCNC: 27 MMOL/L (ref 21–32)
CREAT SERPL-MCNC: 1.33 MG/DL (ref 0.6–1.3)
GFR SERPL CREATININE-BSD FRML MDRD: 54 ML/MIN/1.73SQ M
GLUCOSE SERPL-MCNC: 88 MG/DL (ref 65–140)
MRSA NOSE QL CULT: NORMAL
POTASSIUM SERPL-SCNC: 3.9 MMOL/L (ref 3.5–5.3)
RH BLD: POSITIVE
SODIUM SERPL-SCNC: 140 MMOL/L (ref 135–147)

## 2023-07-12 PROCEDURE — 80048 BASIC METABOLIC PNL TOTAL CA: CPT | Performed by: STUDENT IN AN ORGANIZED HEALTH CARE EDUCATION/TRAINING PROGRAM

## 2023-07-12 PROCEDURE — NC001 PR NO CHARGE: Performed by: THORACIC SURGERY (CARDIOTHORACIC VASCULAR SURGERY)

## 2023-07-12 PROCEDURE — 99232 SBSQ HOSP IP/OBS MODERATE 35: CPT | Performed by: INTERNAL MEDICINE

## 2023-07-12 PROCEDURE — 99232 SBSQ HOSP IP/OBS MODERATE 35: CPT | Performed by: THORACIC SURGERY (CARDIOTHORACIC VASCULAR SURGERY)

## 2023-07-12 PROCEDURE — 99233 SBSQ HOSP IP/OBS HIGH 50: CPT | Performed by: INTERNAL MEDICINE

## 2023-07-12 RX ORDER — POLYETHYLENE GLYCOL 3350 17 G/17G
17 POWDER, FOR SOLUTION ORAL DAILY
Status: DISCONTINUED | OUTPATIENT
Start: 2023-07-12 | End: 2023-07-17 | Stop reason: HOSPADM

## 2023-07-12 RX ORDER — BISACODYL 10 MG
10 SUPPOSITORY, RECTAL RECTAL DAILY
Status: DISCONTINUED | OUTPATIENT
Start: 2023-07-12 | End: 2023-07-17 | Stop reason: HOSPADM

## 2023-07-12 RX ADMIN — EZETIMIBE 10 MG: 10 TABLET ORAL at 21:00

## 2023-07-12 RX ADMIN — GUAIFENESIN 600 MG: 600 TABLET, EXTENDED RELEASE ORAL at 21:01

## 2023-07-12 RX ADMIN — HEPARIN SODIUM 5000 UNITS: 5000 INJECTION INTRAVENOUS; SUBCUTANEOUS at 05:08

## 2023-07-12 RX ADMIN — PANTOPRAZOLE SODIUM 40 MG: 40 TABLET, DELAYED RELEASE ORAL at 06:49

## 2023-07-12 RX ADMIN — FLUTICASONE FUROATE AND VILANTEROL TRIFENATATE 1 PUFF: 200; 25 POWDER RESPIRATORY (INHALATION) at 08:53

## 2023-07-12 RX ADMIN — LUBIPROSTONE 8 MCG: 8 CAPSULE, GELATIN COATED ORAL at 08:53

## 2023-07-12 RX ADMIN — DOXYCYCLINE 100 MG: 100 CAPSULE ORAL at 08:53

## 2023-07-12 RX ADMIN — GUAIFENESIN 600 MG: 600 TABLET, EXTENDED RELEASE ORAL at 08:53

## 2023-07-12 RX ADMIN — PRAMIPEXOLE DIHYDROCHLORIDE 0.25 MG: 0.25 TABLET ORAL at 17:24

## 2023-07-12 RX ADMIN — MUPIROCIN 1 APPLICATION: 20 OINTMENT TOPICAL at 08:53

## 2023-07-12 RX ADMIN — HYDROCORTISONE: 1 CREAM TOPICAL at 17:32

## 2023-07-12 RX ADMIN — ATORVASTATIN CALCIUM 80 MG: 80 TABLET, FILM COATED ORAL at 21:00

## 2023-07-12 RX ADMIN — HEPARIN SODIUM 5000 UNITS: 5000 INJECTION INTRAVENOUS; SUBCUTANEOUS at 14:08

## 2023-07-12 RX ADMIN — MUPIROCIN 1 APPLICATION: 20 OINTMENT TOPICAL at 21:01

## 2023-07-12 RX ADMIN — ASPIRIN 81 MG CHEWABLE TABLET 81 MG: 81 TABLET CHEWABLE at 08:53

## 2023-07-12 RX ADMIN — CARBAMIDE PEROXIDE 6.5% 5 DROP: 6.5 LIQUID AURICULAR (OTIC) at 17:24

## 2023-07-12 RX ADMIN — OXYCODONE HYDROCHLORIDE 5 MG: 5 TABLET ORAL at 05:08

## 2023-07-12 RX ADMIN — PANTOPRAZOLE SODIUM 40 MG: 40 TABLET, DELAYED RELEASE ORAL at 17:24

## 2023-07-12 RX ADMIN — ACETAMINOPHEN 975 MG: 325 TABLET, FILM COATED ORAL at 21:00

## 2023-07-12 RX ADMIN — POLYETHYLENE GLYCOL 3350 17 G: 17 POWDER, FOR SOLUTION ORAL at 14:46

## 2023-07-12 RX ADMIN — METOPROLOL SUCCINATE 50 MG: 50 TABLET, EXTENDED RELEASE ORAL at 08:53

## 2023-07-12 RX ADMIN — TEMAZEPAM 15 MG: 15 CAPSULE ORAL at 21:00

## 2023-07-12 RX ADMIN — ISOSORBIDE MONONITRATE 30 MG: 30 TABLET, EXTENDED RELEASE ORAL at 08:53

## 2023-07-12 RX ADMIN — OXYCODONE HYDROCHLORIDE 5 MG: 5 TABLET ORAL at 11:47

## 2023-07-12 RX ADMIN — HYDROCORTISONE: 1 CREAM TOPICAL at 08:53

## 2023-07-12 RX ADMIN — OXYCODONE HYDROCHLORIDE 5 MG: 5 TABLET ORAL at 17:24

## 2023-07-12 RX ADMIN — DOXYCYCLINE 100 MG: 100 CAPSULE ORAL at 21:00

## 2023-07-12 RX ADMIN — HEPARIN SODIUM 5000 UNITS: 5000 INJECTION INTRAVENOUS; SUBCUTANEOUS at 21:01

## 2023-07-12 RX ADMIN — Medication 10 MG: at 14:46

## 2023-07-12 RX ADMIN — LUBIPROSTONE 8 MCG: 8 CAPSULE, GELATIN COATED ORAL at 21:06

## 2023-07-12 NOTE — QUICK NOTE
Team/CM made aware of patient's personal situation earlier today. During initial consultation patient states he is alone w/o family or friends locally & lives in an apartment up a flight of stairs by himself. Today it was revealed that he has insurance & collects social security but cannot afford an apartment & lives at the [employer]. With this arrangement he has housing overnight but during the day does not & is on the streets/homeless. This precludes him from being a surgical candidate. D/w IC who is willing to do PCI. Patient made aware of this by CM earlier today & initially refused a facility for 30 days should his insurance cover it but now has changed him mind. I have d/w surgeon & CM. Plan will be for CM to determine if his insurance will cover him going to a facility for his initial recovery period. If this is possible then we will offer the patient surgery, if not then he will not be a surgical candidate. CM to update patient. We will d/w patient further once the above is determined. Surgeon/CM/cardiology updated.

## 2023-07-12 NOTE — PROGRESS NOTES
Progress Note - Cardiothoracic Surgery   Rom Parikh 69 y.o. male MRN: [id number]  Unit/Bed#: [facility] 419-01 Encounter: [id number]      24 Hour Events: Pt resting comfortably in bed. No events overnight. Afebrile.     Medications:   Scheduled Meds:  Current Facility-Administered Medications   Medication Dose Route Frequency Provider Last Rate   • acetaminophen  975 mg Oral Q6H PRN Priya Watson MD     • albuterol  2 puff Inhalation Q4H PRN Priya Watson MD     • aluminum-magnesium hydroxide-simethicone  30 mL Oral Q6H PRN Priya Watson MD     • aspirin  81 mg Oral Daily Priya Watson MD     • atorvastatin  80 mg Oral HS Priya Watson MD     • benzonatate  100 mg Oral TID PRN Fabienne Sweeney MD     • chlorhexidine  15 mL Swish & Spit Q12H [address] Dayanna Mitchell PA-C     • doxycycline hyclate  100 mg Oral Q12H Mela Arriaga MD     • ezetimibe  10 mg Oral HS Priya Watson MD     • fluticasone  2 spray Nasal Daily PRN Priya Watson MD     • fluticasone-vilanterol  1 puff Inhalation Daily Priya Watson MD     • guaiFENesin  600 mg Oral Q12H Mela Arriaga MD     • heparin (porcine)  5,000 Units Subcutaneous [facility] Priya Watson MD     • hydrocortisone   Topical BID Priya Watson MD     • hydrOXYzine HCL  50 mg Oral TID PRN Priya Watson MD     • isosorbide mononitrate  30 mg Oral Daily Priya Watson MD     • lubiprostone  8 mcg Oral BID Priya Watson MD     • metoprolol succinate  50 mg Oral Daily Priya Watson MD     • mupirocin   Nasal Q12H [address] Dayanna Mitchell PA-C     • nitroglycerin  0.4 mg Sublingual Q5 Min PRN Priya Watson MD     • ondansetron  4 mg Intravenous Q6H PRN Priya Watson MD     • oxyCODONE  5 mg Oral TID Fabienne Sweeney MD     • pantoprazole  40 mg Oral BID AC Priya Watson MD     • pramipexole  0.25 mg Oral After Marina Carrera MD     • simethicone  80 mg Oral 4x Daily PRN Priya Watson MD     • temazepam  15 mg Oral HS PRN Baylee Plaza PA-C       Continuous Infusions:     Results:   Results from last 7 days   Lab Units 07/10/23  0519 07/09/23  0507 07/08/23  0536   WBC Thousand/uL 7.59 9.57 10.11   HEMOGLOBIN g/dL 10.6* 11.0* 11.3*   HEMATOCRIT % 32.4* 34.5* 35.2*   PLATELETS Thousands/uL 232 207 209     Results from last 7 days   Lab Units 07/12/23  0514 07/11/23  0441 07/10/23  0519   POTASSIUM mmol/L 3.9 3.8 3.5   CHLORIDE mmol/L 111* 112* 109*   CO2 mmol/L 27 27 25   BUN mg/dL 17 15 15   CREATININE mg/dL 1.33* 1.24 1.22   CALCIUM mg/dL 8.9 8.6 8.4           Studies:     Cardiac Catheterization: 7/7  Left Anterior Descending   Ost LAD lesion is 90% stenosed. Culprit for clinical presentation. TARAS flow is 3. Previously placed Mid LAD stent of unknown type is widely patent. Not the culprit lesion. TARAS flow is 3. Left Circumflex   There is moderate diffuse disease throughout the vessel. Ost Cx lesion is 50% stenosed. Previously placed Ost Cx to Prox Cx stent of unknown type is widely patent. Not the culprit lesion. TARAS flow is 3. Right Coronary Artery   There is mild diffuse disease throughout the vessel. Previously placed Mid RCA to Dist RCA stent of unknown type is widely patent. Not the culprit lesion. TARAS flow is 3.               Echocardiogram: 7/8     •  Left Ventricle: Left ventricular cavity size is normal. Wall thickness is normal. The left ventricular ejection fraction is 50%. Systolic function is low normal. Wall motion is normal. Diastolic function is normal for age. •  Right Ventricle: Right ventricular cavity size is normal. Systolic function is normal.  •  Mitral Valve: There is mild regurgitation. •  Tricuspid Valve: There is mild regurgitation.     Findings    Left Ventricle Left ventricular cavity size is normal. Wall thickness is normal. The left ventricular ejection fraction is 50%.  Systolic function is low normal.  Wall motion is normal. Diastolic function is normal for age. Right Ventricle Right ventricular cavity size is normal. Systolic function is normal. Wall thickness is normal.      Left Atrium The atrium is normal in size. Right Atrium The atrium is normal in size. Aortic Valve The aortic valve is trileaflet. The leaflets are not thickened. The leaflets are not calcified. The leaflets exhibit normal mobility. There is no evidence of regurgitation. The aortic valve has no significant stenosis. Mitral Valve Mitral valve structure is normal. There is mild regurgitation. There is no evidence of stenosis. Tricuspid Valve Tricuspid valve structure is normal. There is mild regurgitation. There is no evidence of stenosis. Pulmonic Valve Pulmonic valve structure is normal. There is trace regurgitation. There is no evidence of stenosis. Ascending Aorta The aortic root is normal in size. IVC/SVC The inferior vena cava is normal in size. Pericardium There is no pericardial effusion. The pericardium is normal in appearance.         Left Ventricle Measurements    Function/Volumes   A4C EF 50 %         Dimensions   LVIDd 4.7 cm         LVIDS 3.5 cm         IVSd 1.1 cm         LVPWd 1 cm         FS 26          Diastolic Filling   MV E' Tissue Velocity Septal 10 cm/s         E wave deceleration time 160 ms         MV Peak E Yemi 86 cm/s         MV Peak A Yemi 0.97 m/s               Right Ventricle Measurements    Dimensions   RVID d 3.1 cm         Tricuspid annular plane systolic excursion 2.4 cm               Left Atrium Measurements    Dimensions   LA size 3.8 cm         LA length (A2C) 5.6 cm         Volumes   LA volume (BP) 51 mL               Right Atrium Measurements    Dimensions   RAA A4C 16.7 cm2               Mitral Valve Measurements    Stenosis   MV stenosis pressure 1/2 time 46 ms         MV valve area p 1/2 method 4.78                Tricuspid Valve Measurements    RVSP Parameters   TR Peak Yemi 2.6 m/s         Triscuspid Valve Regurgitation Peak Gradient 28 mmHg               Aorta Measurements    Aortic Dimensions   Ao root 3.6 cm         Asc Ao 3.1 cm               Carotid artery duplex:   < 50% right carotid stenosis. Vertebral artery flow is antegrade and There is no significant subclavian artery   < 50% left carotid stenosis. Vertebral artery flow is antegrade and There is no significant subclavian artery    CT Chest: 7/8  FINDINGS:     LUNGS: Mild septal thickening and groundglass opacity due to edema. Minimal dependent atelectasis in the lower lobes.     AIRWAYS: No significant filling defects.     PLEURA:  Unremarkable.     HEART/GREAT VESSELS: Mild cardiomegaly. Moderate coronary artery calcification indicating atherosclerotic heart disease. Normal caliber ascending aorta with no calcification. No pericardial effusion or adhesion.     MEDIASTINUM AND MELITON:  Unremarkable.     CHEST WALL AND LOWER NECK: Unremarkable.     UPPER ABDOMEN: Vicarious excretion of contrast in the gallbladder.     OSSEOUS STRUCTURES: Mild degenerative disease in the spine.     IMPRESSION:     Normal caliber ascending aorta with no significant atherosclerotic calcification.     No pericardial effusion or adhesion.     Mild edema. Vitals:   Vitals:    07/11/23 1532 07/11/23 2258 07/12/23 0238 07/12/23 0657   BP: 112/61 115/59 138/65 148/73   Pulse: 57 56 62 62   Resp: 16 14 16 20   Temp: 98.3 °F (36.8 °C) 98.1 °F (36.7 °C) 98.2 °F (36.8 °C) 98.4 °F (36.9 °C)   TempSrc:       SpO2: 97% 98% 93% 96%   Weight:       Height:           Physical Exam:    HEENT/NECK:  Normocephalic. Atraumatic. No jugular venous distention. Cardiac: Regular rate and rhythm  Pulmonary:  normal respiratory effort  Abdomen:  Non-distended  Neuro: somnolent  Skin: Warm/Dry, without rashes or lesions. Assessment:    Severe coronary artery disease; Ongoing CABG workup    Plan:  Patient agreeable to proceed with surgery;  Informed consent signed and on chart. Carotid ultrasound completed, and acceptable    CT Chest ok    Fever, poss sinusitis - On Day 5/5 Doxycycline. Blood cultures with NG x 72 hrs. Remains Afebrile. Blood type and cross match ordered for 7/13    Continue Mupirocin 2% nasal ointment q 12 hrs    Continue topical chlorhexidine bath and mouth rise    CABGx1 with LIMA to LAD scheduled for Friday, 7/14 with Dr. Francois Herrera M.D.    Santiago Mir: Carolyne Arshad PA-C  DATE: July 12, 2023  TIME: 8:19 AM    * This note was completed in part utilizing [email or web address] direct voice recognition software. Grammatical errors, random word insertion, spelling mistakes, and incomplete sentences may be an occasional consequence of the system secondary to software limitations, ambient noise and hardware issues. At the time of dictation, efforts were made to edit, clarify and /or correct errors. Please read the chart carefully and recognize, using context, where substitutions have occurred. If you have any questions or concerns about the context, text or information contained within the body of this dictation, please contact myself, the provider, for further clarification.

## 2023-07-12 NOTE — PROGRESS NOTES
Progress Note - Cardiothoracic Surgery   Srinivas Hazel 69 y.o. male MRN: [id number]  Unit/Bed#: [facility] 419-01 Encounter: [id number]      24 Hour Events: See quick note for update on CM/cardiology/surgeon involvement on social situation. In short, patient is homeless during the day & has no support system to stay w/. Per CM if qualifies for rehab has coverage w/ insurance but does not have any other coverage for facility to stay at for initial 30 day recovery period, also if would go to rehab does not guarantee he would be able to stay there for full recovery period if he progresses & is cleared for discharge from rehab before those 30 days. Reviewed w/ patient at bedside however became aggressive & did not wish to discuss further. Per CM was in earlier & told her "you are going to kill me now".     Medications:   Scheduled Meds:  Current Facility-Administered Medications   Medication Dose Route Frequency Provider Last Rate    acetaminophen  975 mg Oral Q6H PRN Aida Hebert MD      albuterol  2 puff Inhalation Q4H PRN Aida Hebert MD      aluminum-magnesium hydroxide-simethicone  30 mL Oral Q6H PRN Aida Hebert MD      aspirin  81 mg Oral Daily Aida Hebert MD      atorvastatin  80 mg Oral HS Aida Hebert MD      benzonatate  100 mg Oral TID PRN Orquidea Kuo MD      bisacodyl  10 mg Rectal Daily Hetul Slater, DO      carbamide peroxide  5 drop Both Ears BID Hetul Slater, DO      chlorhexidine  15 mL Swish & Spit Q12H [address] Dayanna Mitchell PA-C      doxycycline hyclate  100 mg Oral Q12H [address] Orquidea Kuo MD      ezetimibe  10 mg Oral HS Aida Hebert MD      fluticasone  2 spray Nasal Daily PRN Aida Hebert MD      fluticasone-vilanterol  1 puff Inhalation Daily Aida Hebert MD      guaiFENesin  600 mg Oral Q12H [address] Orquidea Kuo MD      heparin (porcine)  5,000 Units Subcutaneous [facility] Aida Hebert MD      hydrocortisone   Topical BID Aida Hebert MD      hydrOXYzine HCL  50 mg Oral TID PRN Odalys Paz MD      isosorbide mononitrate  30 mg Oral Daily Odalys Paz MD      lubiprostone  8 mcg Oral BID Odalys Paz MD      metoprolol succinate  50 mg Oral Daily Odalys Paz MD      mupirocin   Nasal Q12H [address] Dayanna Mitchell PA-C      nitroglycerin  0.4 mg Sublingual Q5 Min PRN Odalys Paz MD      ondansetron  4 mg Intravenous Q6H PRN Odalys Paz MD      oxyCODONE  5 mg Oral TID Chelsea Spruce, MD      pantoprazole  40 mg Oral BID AC Odalys Paz MD      polyethylene glycol  17 g Oral Daily Kishore Slater DO      pramipexole  0.25 mg Oral After Dolores Lockwood MD      simethicone  80 mg Oral 4x Daily PRN Odalys Paz MD      temazepam  15 mg Oral HS PRN Baylee Plaza PA-C       Continuous Infusions:     Results:   Results from last 7 days   Lab Units 07/10/23  0519 07/09/23  0507 07/08/23  0536   WBC Thousand/uL 7.59 9.57 10.11   HEMOGLOBIN g/dL 10.6* 11.0* 11.3*   HEMATOCRIT % 32.4* 34.5* 35.2*   PLATELETS Thousands/uL 232 207 209     Results from last 7 days   Lab Units 07/12/23  0514 07/11/23  0441 07/10/23  0519   POTASSIUM mmol/L 3.9 3.8 3.5   CHLORIDE mmol/L 111* 112* 109*   CO2 mmol/L 27 27 25   BUN mg/dL 17 15 15   CREATININE mg/dL 1.33* 1.24 1.22   CALCIUM mg/dL 8.9 8.6 8.4           Studies:     Cardiac Cath 7/7: 90% ostial LAD, patent mid LAD/prox circ/OM/distal RCA stents     TTE 6/28 (External Image Report): EF 55-60%, grade 1 DD, trace MR, trace TR     TTE 7/8: EF 50%, mild MR, mild TR     Carotid US 7/8: <50% ICA stenosis b/l, antegrade flow b/l, no subclvaidn disease b/l     CT chest w/o 7/8: mild pulm edema otherwise no abnormalities     Vitals:   Vitals:    07/12/23 0657 07/12/23 0800 07/12/23 1033 07/12/23 1300   BP: 148/73  148/71    Pulse: 62  60    Resp: 20  16    Temp: 98.4 °F (36.9 °C)  98.3 °F (36.8 °C)    TempSrc:       SpO2: 96% 96% 95%    Weight:    84.4 kg (186 lb 1.1 oz) Height:           Physical Exam:    Unable to perform due to patient aggression/refusal to cooperate. On RA, no respiratory distress, does not have focal deficit, RRR on telem monitor    Assessment:    Severe coronary artery disease; Ongoing CABG workup    Plan:  Patient agreeable to proceed with surgery;    Pre-op work-up complete --> not surgical candidate for reasons listed above. Cardiology made aware. Sinusitis   Afebrile   No leukocytosis   Doxy day 5/5 complete    Disontinue Mupirocin 2% nasal ointment q 12 hrs    Discontinue topical chlorhexidine bath and mouth rise    Ok to resume Plavix at cardiology decision    Cardiac sx will sing off. Surgeon to address w/ patient in person tomorrow AM. Addressed w/ patient at bedside & w/ CM today. Surgeon will reiterate tomorrow & ensure all questions are addressed again if there are further ones. Case discussed w/ Dr. Ana Cristina Gomes: Beba Vidales PA-C  DATE: July 12, 2023  TIME: 3:28 PM    * This note was completed in part utilizing [employer] direct voice recognition software. Grammatical errors, random word insertion, spelling mistakes, and incomplete sentences may be an occasional consequence of the system secondary to software limitations, ambient noise and hardware issues. At the time of dictation, efforts were made to edit, clarify and /or correct errors. Please read the chart carefully and recognize, using context, where substitutions have occurred. If you have any questions or concerns about the context, text or information contained within the body of this dictation, please contact myself, the provider, for further clarification.

## 2023-07-12 NOTE — PROGRESS NOTES
INTERNAL MEDICINE RESIDENCY PROGRESS NOTE     Name: Gabriele Coyle   Age & Sex: 69 y.o. male   MRN: [id number]  Unit/Bed#: [facility] 419-01   Encounter: [id number]  Team: SLIM    PATIENT INFORMATION     Name: Gabriele Coyle   Age & Sex: 69 y.o. male   MRN: [address] Stay Days: 5    ASSESSMENT/PLAN     Principal Problem:    Coronary artery disease  Active Problems:    Gastroesophageal reflux disease without esophagitis    HLD (hyperlipidemia)    Restless legs    Mild persistent asthma without complication    Uncomplicated opioid dependence ([address])    [place] (hard of hearing)    Fever    Stage 3a chronic kidney disease ([address])    Primary hypertension    Irritable bowel syndrome with constipation      * Coronary artery disease  Assessment & Plan  SLA 7/6 with 1 month progressive exertional chest discomfort  Dec 2021- inf MI s/p MERRITT to dRCA residual mLAD  Cath Jan 2022 and June 2022  LVH June 2023- nonischemic, 7/7/23 cath- Manual [place] LAD 90% stenosed   SLB for cardiac surgery CABG eval    VAS carotid <50% stenosis  CT chest- normal caliber aorta, no significant atherosclerotic, no pericardial effusion/adhesion, mild edema  Echo- 50% EF    Plan:  ASA 81, atorva 80 mg , toprol 50 mg daily  Plavix and Losartan held  Tele monitoring  Per CTS, plan for CABG Friday 7/14    Irritable bowel syndrome with constipation  Assessment & Plan  IBS-C on [employer]  BM every 4 days   Denies abd pain or diarrhea or constipation at this time      Primary hypertension  Assessment & Plan  -149  Losartan 50 mg HELD given Cr elevation and pending CABG    Continue Toprol 50 mg daily      Stage 3a chronic kidney disease [facility])  Assessment & Plan  Lab Results   Component Value Date    EGFR 54 07/12/2023    EGFR 58 07/11/2023    EGFR 60 07/10/2023    CREATININE 1.33 (H) 07/12/2023    CREATININE 1.24 07/11/2023    CREATININE 1.22 07/10/2023     Baseline 1.1-1.3  Losartan held  Avoid nephrotoxins and hypotension  Encouraged oral hydration Fever  Assessment & Plan  • Intermittent fever  • A few days ago patient had a right ear pressure, right-sided sinus pain/pressure, later developed postnasal drip and productive cough  • Suspect sinus infection, viral upper respiratory tract  infection  • Pro-Ramez -negative x2  • Chest x-ray  negative  • COVID/flu/RSV negative  • Blood cultures negative at 24 hours  • CT chest showed mild edema, minimal dependent atelectasis in the lower lobes, no consolidation  • Afrin significantly improved his symptoms, relieving sinus pressure  • Continue doxycycline for possible sinus infection, he has penicillin allergy and not sure if he ever had cephalosporins      Currently afebrile for 24 hrs without leukocytosis    Day 5 out of 5 doxycycline, complete  • Mucinex, Tessalon Perles  • Incentive spirometry  • Continue Afrin    [place] (hard of hearing)  Assessment & Plan  Noted    Uncomplicated opioid dependence (HCC)  Assessment & Plan  Verified on PDMP  Continue oxy IR 5 mg TID      Mild persistent asthma without complication  Assessment & Plan  Continue breo with PRN albuterol    Restless legs  Assessment & Plan  Continue Pramipexole 0.25 mg daily    HLD (hyperlipidemia)  Assessment & Plan  On Atorvastatin 80 mg daily  Zetia 10 mg daily    Gastroesophageal reflux disease without esophagitis  Assessment & Plan  Continue Protonix 40 mg BID        Disposition: Pending CABG     SUBJECTIVE     Patient seen and examined. No acute events overnight. Patient feels the same today, no chest pain or SOB. Admits to not drinking water as much due to taste. Encouraged oral intake.     OBJECTIVE     Vitals:    23 0238 23 0657 23 0800 23 1033   BP: 138/65 148/73  148/71   Pulse: 62 62  60   Resp: 16 20  16   Temp: 98.2 °F (36.8 °C) 98.4 °F (36.9 °C)  98.3 °F (36.8 °C)   TempSrc:       SpO2: 93% 96% 96% 95%   Weight:       Height:          Temperature:   Temp (24hrs), Av.3 °F (36.8 °C), Min:98.1 °F (36.7 °C), Max:98.4 °F (36.9 °C)    Temperature: 98.3 °F (36.8 °C)  Intake & Output:  I/O       07/10 0701  07/11 0700 07/11 0701 07/12 0700 07/12 0701 07/13 0700    P. O. 321 464     Total Intake(mL/kg) 321 (3.9) 464 (5.6)     Urine (mL/kg/hr)  200 (0.1)     Total Output  200     Net +321 +264                Weights:   IBW (Ideal Body Weight): 77.6 kg    Body mass index is 24.85 kg/m². Weight (last 2 days)     None        Physical Exam  Vitals reviewed. Constitutional:       General: He is not in acute distress. HENT:      Head: Normocephalic and atraumatic. Mouth/Throat:      Pharynx: Oropharynx is clear. Eyes:      Conjunctiva/sclera: Conjunctivae normal.   Cardiovascular:      Rate and Rhythm: Normal rate and regular rhythm. Pulses: Normal pulses. Heart sounds: Normal heart sounds. Pulmonary:      Effort: Pulmonary effort is normal.      Breath sounds: Normal breath sounds. Abdominal:      General: There is no distension. Palpations: Abdomen is soft. Musculoskeletal:         General: Normal range of motion. Right lower leg: No edema. Left lower leg: No edema. Neurological:      Mental Status: He is alert and oriented to person, place, and time. LABORATORY DATA     Labs: I have personally reviewed pertinent reports.   Results from last 7 days   Lab Units 07/10/23  0519 07/09/23  0507 07/08/23  0536 07/07/23  0541 07/1955   WBC Thousand/uL 7.59 9.57 10.11 10.96* 11.08*   HEMOGLOBIN g/dL 10.6* 11.0* 11.3* 11.3* 10.4*   HEMATOCRIT % 32.4* 34.5* 35.2* 35.2* 32.7*   PLATELETS Thousands/uL 232 207 209 208 202   NEUTROS PCT %  --   --  66 60 62   MONOS PCT %  --   --  10 9 10   EOS PCT %  --   --  2 3 3      Results from last 7 days   Lab Units 07/12/23  0514 07/11/23  0441 07/10/23  0519 07/07/23  0541 07/1955   POTASSIUM mmol/L 3.9 3.8 3.5   < > 5.1   CHLORIDE mmol/L 111* 112* 109*   < > 109*   CO2 mmol/L 27 27 25   < > 24   BUN mg/dL 17 15 15   < > 28*   CREATININE mg/dL 1.33* 1.24 1.22   < > 1.62*   CALCIUM mg/dL 8.9 8.6 8.4   < > 8.5   ALK PHOS U/L  --   --   --   --  59   ALT U/L  --   --   --   --  15   AST U/L  --   --   --   --  28    < > = values in this interval not displayed. Results from last 7 days   Lab Units 07/08/23  0536   MAGNESIUM mg/dL 1.9                        IMAGING & DIAGNOSTIC TESTING     Radiology Results: I have personally reviewed pertinent reports. CT chest wo contrast    Result Date: 7/8/2023  Impression: Normal caliber ascending aorta with no significant atherosclerotic calcification. No pericardial effusion or adhesion. Mild edema. Workstation performed: [id number]     Other Diagnostic Testing: I have personally reviewed pertinent reports.     ACTIVE MEDICATIONS     Current Facility-Administered Medications   Medication Dose Route Frequency   • acetaminophen (TYLENOL) tablet 975 mg  975 mg Oral Q6H PRN   • albuterol (PROVENTIL HFA,VENTOLIN HFA) inhaler 2 puff  2 puff Inhalation Q4H PRN   • aluminum-magnesium hydroxide-simethicone (MAALOX) oral suspension 30 mL  30 mL Oral Q6H PRN   • aspirin chewable tablet 81 mg  81 mg Oral Daily   • atorvastatin (LIPITOR) tablet 80 mg  80 mg Oral HS   • benzonatate (TESSALON PERLES) capsule 100 mg  100 mg Oral TID PRN   • chlorhexidine (PERIDEX) 0.12 % oral rinse 15 mL  15 mL Swish & Spit Q12H [address]   • doxycycline hyclate (VIBRAMYCIN) capsule 100 mg  100 mg Oral Q12H [address]   • ezetimibe (ZETIA) tablet 10 mg  10 mg Oral HS   • fluticasone (FLONASE) 50 mcg/act nasal spray 2 spray  2 spray Nasal Daily PRN   • fluticasone-vilanterol 200-25 mcg/actuation 1 puff  1 puff Inhalation Daily   • guaiFENesin (MUCINEX) 12 hr tablet 600 mg  600 mg Oral Q12H PALLAVI   • heparin (porcine) subcutaneous injection 5,000 Units  5,000 Units Subcutaneous Q8H [address]   • hydrocortisone 1 % cream   Topical BID   • hydrOXYzine HCL (ATARAX) tablet 50 mg  50 mg Oral TID PRN   • isosorbide mononitrate (IMDUR) 24 hr tablet 30 mg  30 mg Oral Daily • lubiprostone (AMITIZA) capsule 8 mcg  8 mcg Oral BID   • metoprolol succinate (TOPROL-XL) 24 hr tablet 50 mg  50 mg Oral Daily   • mupirocin (BACTROBAN) 2 % nasal ointment   Nasal Q12H PALLAVI   • nitroglycerin (NITROSTAT) SL tablet 0.4 mg  0.4 mg Sublingual Q5 Min PRN   • ondansetron (ZOFRAN) injection 4 mg  4 mg Intravenous Q6H PRN   • oxyCODONE (ROXICODONE) IR tablet 5 mg  5 mg Oral TID   • pantoprazole (PROTONIX) EC tablet 40 mg  40 mg Oral BID AC   • pramipexole (MIRAPEX) tablet 0.25 mg  0.25 mg Oral After Dinner   • simethicone (MYLICON) chewable tablet 80 mg  80 mg Oral 4x Daily PRN   • temazepam (RESTORIL) capsule 15 mg  15 mg Oral HS PRN       VTE Pharmacologic Prophylaxis: Heparin  VTE Mechanical Prophylaxis: sequential compression device    Portions of the record may have been created with voice recognition software. Occasional wrong word or "sound a like" substitutions may have occurred due to the inherent limitations of voice recognition software.   Read the chart carefully and recognize, using context, where substitutions have occurred.  ==  Cait Mensah MD  [facility]  Internal Medicine Residency PGY-2

## 2023-07-12 NOTE — CASE MANAGEMENT
Case Management Discharge Planning Note    Patient name Concepción Damico  [facility] MRN [id number]  : 1955 Date 2023       Current Admission Date: 2023  Current Admission Diagnosis:Coronary artery disease   Patient Active Problem List    Diagnosis Date Noted   • Primary hypertension 2023   • Irritable bowel syndrome with constipation 2023   • Stage 3a chronic kidney disease ([address]) 2023   • Angina pectoris ([address]) 2023   • Coronary artery disease    • Uncomplicated opioid dependence ([address]) 2023   • [place] (hard of hearing) 2023   • Fever 2023   • Chest pain 2021   • Gastroesophageal reflux disease without esophagitis 2021   • HLD (hyperlipidemia) 2021   • Restless legs 2021   • Mild persistent asthma without complication    • Abnormal CT scan of head 2021      LOS (days): 5  Geometric Mean LOS (GMLOS) (days): 1.80  Days to GMLOS:-2.8     OBJECTIVE:  Risk of Unplanned Readmission Score: 19.6         Current admission status: Inpatient   Preferred Pharmacy:   [facility] [address]  [address] [place] [address]  Phone: [phone] Fax: [phone]    Primary Care Provider: Julio Naik DO    Primary Insurance: MEDICARE  Secondary Insurance: [facility]    DISCHARGE DETAILS:              Additional Comments: CM introduced herself and role to pt at bedside. CM discussed aftercare options such as rehab with pt due to pt resididng at [facility]. Pt appeared to be upset and stated he did not want to go to rehab. Pt then stated " great, if I go to rehab there going to give me oxycodone". CM then asked pt if he had any family or friends he could stay with in the mean time while he recovers from surgery.  Pt stated " I have exhausted all of my options and I don't get enough from social secuirty to afford an apartment". CM asked pt if he had a  or  at the [facility]. Pt stated no. CM contacted [facility] and spoke to Kyrie Carreno. Kyrie Carreno stated the [facility] is opened from 4:30pm to 8:00 am, after 8:00 am there is no one allowed in the building until they reopen. CM asked Kyrie Carreno if they make expections for people who0 just had a surgical procedure and would need post op care. Kyrie Carreno stated " No, because we are short staffed". CM will continue to follow.

## 2023-07-12 NOTE — PLAN OF CARE
Problem: PAIN - ADULT  Goal: Verbalizes/displays adequate comfort level or baseline comfort level  Description: Interventions:  - Encourage patient to monitor pain and request assistance  - Assess pain using appropriate pain scale  - Administer analgesics based on type and severity of pain and evaluate response  - Implement non-pharmacological measures as appropriate and evaluate response  - Consider cultural and social influences on pain and pain management  - Notify physician/advanced practitioner if interventions unsuccessful or patient reports new pain  Outcome: Progressing     Problem: INFECTION - ADULT  Goal: Absence or prevention of progression during hospitalization  Description: INTERVENTIONS:  - Assess and monitor for signs and symptoms of infection  - Monitor lab/diagnostic results  - Monitor all insertion sites, i.e. indwelling lines, tubes, and drains  - Monitor endotracheal if appropriate and nasal secretions for changes in amount and color  - [place] appropriate cooling/warming therapies per order  - Administer medications as ordered  - Instruct and encourage patient and family to use good hand hygiene technique  - Identify and instruct in appropriate isolation precautions for identified infection/condition  Outcome: Progressing      Problem: SAFETY ADULT  Goal: Patient will remain free of falls  Description: INTERVENTIONS:  - Educate patient/family on patient safety including physical limitations  - Instruct patient to call for assistance with activity   - Consult OT/PT to assist with strengthening/mobility   - Keep Call bell within reach  - Keep bed low and locked with side rails adjusted as appropriate  - Keep care items and personal belongings within reach  - Initiate and maintain comfort rounds  - Make Fall Risk Sign visible to staff  - Offer Toileting every 2 Hours, in advance of need  - Obtain necessary fall risk management equipment:  - Apply yellow socks and bracelet for high fall risk patients  - Consider moving patient to room near nurses station  Outcome: Progressing  Goal: Maintain or return to baseline ADL function  Description: INTERVENTIONS:  -  Assess patient's ability to carry out ADLs; assess patient's baseline for ADL function and identify physical deficits which impact ability to perform ADLs (bathing, care of mouth/teeth, toileting, grooming, dressing, etc.)  - Assess/evaluate cause of self-care deficits   - Assess range of motion  - Assess patient's mobility; develop plan if impaired  - Assess patient's need for assistive devices and provide as appropriate  - Encourage maximum independence but intervene and supervise when necessary  - Involve family in performance of ADLs  - Assess for home care needs following discharge   - Consider OT consult to assist with ADL evaluation and planning for discharge  - Provide patient education as appropriate  Outcome: Progressing  Goal: Maintains/Returns to pre admission functional level  Description: INTERVENTIONS:  - Perform BMAT or MOVE assessment daily.   - Set and communicate daily mobility goal to care team and patient/family/caregiver.    - Collaborate with rehabilitation services on mobility goals if consulted  - Ambulate patient 3 times a day  - Out of bed for toileting  - Record patient progress and toleration of activity level   Outcome: Progressing     Problem: SAFETY ADULT  Goal: Maintain or return to baseline ADL function  Description: INTERVENTIONS:  -  Assess patient's ability to carry out ADLs; assess patient's baseline for ADL function and identify physical deficits which impact ability to perform ADLs (bathing, care of mouth/teeth, toileting, grooming, dressing, etc.)  - Assess/evaluate cause of self-care deficits   - Assess range of motion  - Assess patient's mobility; develop plan if impaired  - Assess patient's need for assistive devices and provide as appropriate  - Encourage maximum independence but intervene and supervise when necessary  - Involve family in performance of ADLs  - Assess for home care needs following discharge   - Consider OT consult to assist with ADL evaluation and planning for discharge  - Provide patient education as appropriate  Outcome: Progressing     Problem: SAFETY ADULT  Goal: Maintains/Returns to pre admission functional level  Description: INTERVENTIONS:  - Perform BMAT or MOVE assessment daily.   - Set and communicate daily mobility goal to care team and patient/family/caregiver. - Collaborate with rehabilitation services on mobility goals if consulted  - Ambulate patient 3 times a day  - Out of bed for toileting  - Record patient progress and toleration of activity level   Outcome: Progressing     Problem: DISCHARGE PLANNING  Goal: Discharge to home or other facility with appropriate resources  Description: INTERVENTIONS:  - Identify barriers to discharge w/patient and caregiver  - Arrange for needed discharge resources and transportation as appropriate  - Identify discharge learning needs (meds, wound care, etc.)  - Arrange for interpretive services to assist at discharge as needed  - Refer to Case Management Department for coordinating discharge planning if the patient needs post-hospital services based on physician/advanced practitioner order or complex needs related to functional status, cognitive ability, or social support system  Outcome: Progressing     Problem: Knowledge Deficit  Goal: Patient/family/caregiver demonstrates understanding of disease process, treatment plan, medications, and discharge instructions  Description: Complete learning assessment and assess knowledge base.   Interventions:  - Provide teaching at level of understanding  - Provide teaching via preferred learning methods  Outcome: Progressing

## 2023-07-12 NOTE — PROGRESS NOTES
Cardiology Progress Note - Caridad Castro 69 y.o. male MRN: [id number]    Unit/Bed#: [facility] 419-01 Encounter: [id number]        Hospital Problems:  Principal Problem:    Coronary artery disease  Active Problems:    Gastroesophageal reflux disease without esophagitis    HLD (hyperlipidemia)    Restless legs    Mild persistent asthma without complication    Uncomplicated opioid dependence (HCC)    [place] (hard of hearing)    Fever    Stage 3a chronic kidney disease (HCC)    Primary hypertension    Irritable bowel syndrome with constipation      Assessment/Recommendations:    Severe ostial LAD disease, patent mid LAD, circumflex and RCA stents  No angina on medical therapy. CABG with LIMA to the LAD planned for 7/14/2023. Cardiac surgery notes reviewed. Continue current medical therapy. Encouraged ambulation. Dyslipidemia  Tolerating high intensity statins and Zetia. Hypertension  Controlled. Sinusitis  Improving on antibiotics. IBS  Reports constipation. Discussed with Dr. Robert Redmond regarding stool softeners. Subjective:     Overnight events reviewed. No angina reported. Cardiac surgery notes reviewed, surgery is  planned for Friday. Reports constipation. Review of Systems   Constitutional: Positive for fatigue. HENT: Positive for hearing loss. Respiratory: Negative for chest tightness and shortness of breath. Cardiovascular: Negative for chest pain and palpitations. Gastrointestinal: Positive for constipation. Psychiatric/Behavioral: Positive for sleep disturbance. All other systems reviewed and are negative. Review of ten system was conducted and was negative except for findings stated elsewhere in the note.         Current Facility-Administered Medications:   •  acetaminophen (TYLENOL) tablet 975 mg, 975 mg, Oral, Q6H PRN, Fausto Kussmaul, MD, 975 mg at 07/10/23 1347  •  albuterol (PROVENTIL HFA,VENTOLIN HFA) inhaler 2 puff, 2 puff, Inhalation, Q4H PRN, Fausto Kussmaul, MD  •  aluminum-magnesium hydroxide-simethicone (MAALOX) oral suspension 30 mL, 30 mL, Oral, Q6H PRN, Doris Vargas MD  •  aspirin chewable tablet 81 mg, 81 mg, Oral, Daily, Doris Vargas MD, 81 mg at 07/12/23 0853  •  atorvastatin (LIPITOR) tablet 80 mg, 80 mg, Oral, HS, Doris Vargas MD, 80 mg at 07/11/23 2123  •  benzonatate (TESSALON PERLES) capsule 100 mg, 100 mg, Oral, TID PRN, Catina Jeter MD, 100 mg at 07/11/23 1714  •  chlorhexidine (PERIDEX) 0.12 % oral rinse 15 mL, 15 mL, Swish & Spit, Q12H [address], Dayanna Mitchell PA-C, 15 mL at 07/08/23 1034  •  doxycycline hyclate (VIBRAMYCIN) capsule 100 mg, 100 mg, Oral, Q12H [address], Catina Jeter MD, 100 mg at 07/12/23 9670  •  ezetimibe (ZETIA) tablet 10 mg, 10 mg, Oral, HS, Doris Vargas MD, 10 mg at 07/11/23 2123  •  fluticasone (FLONASE) 50 mcg/act nasal spray 2 spray, 2 spray, Nasal, Daily PRN, Doris Vargas MD  •  fluticasone-vilanterol 200-25 mcg/actuation 1 puff, 1 puff, Inhalation, Daily, Doris Vargas MD, 1 puff at 07/12/23 0853  •  guaiFENesin (MUCINEX) 12 hr tablet 600 mg, 600 mg, Oral, Q12H [address], Catina Jeter MD, 600 mg at 07/12/23 0853  •  heparin (porcine) subcutaneous injection 5,000 Units, 5,000 Units, Subcutaneous, Q8H [address], Doris Vargas MD, 5,000 Units at 07/12/23 0508  •  hydrocortisone 1 % cream, , Topical, BID, Doris Vargas MD, Given at 07/12/23 4559  •  hydrOXYzine HCL (ATARAX) tablet 50 mg, 50 mg, Oral, TID PRN, Doris Vargas MD  •  isosorbide mononitrate (IMDUR) 24 hr tablet 30 mg, 30 mg, Oral, Daily, Doris Vargas MD, 30 mg at 07/12/23 6530  •  lubiprostone (AMITIZA) capsule 8 mcg, 8 mcg, Oral, BID, Doris Vargas MD, 8 mcg at 07/12/23 0853  •  metoprolol succinate (TOPROL-XL) 24 hr tablet 50 mg, 50 mg, Oral, Daily, Doris Vargas MD, 50 mg at 07/12/23 0853  •  mupirocin (BACTROBAN) 2 % nasal ointment, , Nasal, Q12H [address], Dayanna Mitchell PA-C, 1 Application at 93/88/59 0853  • nitroglycerin (NITROSTAT) SL tablet 0.4 mg, 0.4 mg, Sublingual, Q5 Min PRN, Naima Ferris MD  •  [facility]ron [facility]) injection 4 mg, 4 mg, Intravenous, Q6H PRN, Naima Ferris MD  •  oxyCODONE (ROXICODONE) IR tablet 5 mg, 5 mg, Oral, TID, Brandie Chapa MD, 5 mg at 07/12/23 0508  •  pantoprazole (PROTONIX) EC tablet 40 mg, 40 mg, Oral, BID AC, Naima Ferris MD, 40 mg at 07/12/23 3165  •  pramipexole (MIRAPEX) tablet 0.25 mg, 0.25 mg, Oral, After Estella Wheeler MD, 0.25 mg at 07/11/23 1711  •  simethicone (MYLICON) chewable tablet 80 mg, 80 mg, Oral, 4x Daily PRN, Naima Ferris MD  •  temazepam (RESTORIL) capsule 15 mg, 15 mg, Oral, HS PRN, Brie Siddiqui PA-C, 15 mg at 07/11/23 2136     Objective:     Vitals:   Blood pressure 148/73, pulse 62, temperature 98.4 °F (36.9 °C), resp. rate 20, height 6' (1.829 m), weight 83.1 kg (183 lb 3.2 oz), SpO2 96 %. Body mass index is 24.85 kg/m². Orthostatic Blood Pressures    Flowsheet Row Most Recent Value   Blood Pressure 148/73 filed at 07/12/2023 1448   Patient Position - Orthostatic VS Sitting filed at 07/10/2023 0094         Systolic (22DKR), NNO:254 , Min:112 , JZJ:027     Diastolic (50GZJ), JJH:79, Min:59, Max:73      Intake/Output Summary (Last 24 hours) at 7/12/2023 1031  Last data filed at 7/11/2023 1734  Gross per 24 hour   Intake 464 ml   Output --   Net 464 ml     Weight (last 2 days)     None            Physical Exam  Vitals reviewed. HENT:      Head: Normocephalic. Cardiovascular:      Rate and Rhythm: Normal rate and regular rhythm. Heart sounds: No murmur heard. Pulmonary:      Breath sounds: No wheezing or rhonchi. Abdominal:      Tenderness: There is no abdominal tenderness. Musculoskeletal:      Right lower leg: No edema. Left lower leg: No edema. Skin:     General: Skin is warm. Neurological:      Mental Status: Mental status is at baseline.    Psychiatric:         Mood and Affect: Mood normal. Labs & Results:    Lab Results   Component Value Date    SODIUM 140 07/12/2023    K 3.9 07/12/2023     (H) 07/12/2023    CO2 27 07/12/2023    BUN 17 07/12/2023    CREATININE 1.33 (H) 07/12/2023    GLUC 88 07/12/2023    CALCIUM 8.9 07/12/2023     Lab Results   Component Value Date    WBC 7.59 07/10/2023    RBC 3.62 (L) 07/10/2023    HGB 10.6 (L) 07/10/2023    HCT 32.4 (L) 07/10/2023    MCV 90 07/10/2023    MCH 29.3 07/10/2023    RDW 12.7 07/10/2023     07/10/2023         Lab Results   Component Value Date    LDLCALC 40 07/09/2023     Lab Results   Component Value Date    RSF9FVPBPLWP 2.985 05/13/2021           Available cardiac and imaging studies were reviewed independently. Available cardiology studies, imaging and lab results independently reviewed today. This note was completed in part utilizing voice recognition software. Grammatical errors, random word insertion, spelling mistakes, occasional wrong word or "sound-alike" substitutions and incomplete sentences may be an occasional consequence of the system secondary to software limitations, ambient noise and hardware issues. At the time of dictation, efforts were made to edit, clarify and /or correct errors. Please read the chart carefully and recognize, using context, where substitutions have occurred. If you have any questions or concerns about the context, text or information contained within the body of this dictation, please contact myself, the provider, for further clarification.

## 2023-07-12 NOTE — CASE MANAGEMENT
Case Management Discharge Planning Note    Patient name Dian Wells  [facility] [id number]  : 1955 Date 2023       Current Admission Date: 2023  Current Admission Diagnosis:Coronary artery disease   Patient Active Problem List    Diagnosis Date Noted   • Primary hypertension 2023   • Irritable bowel syndrome with constipation 2023   • Stage 3a chronic kidney disease ([address]) 2023   • Angina pectoris ([address]) 2023   • Coronary artery disease    • Uncomplicated opioid dependence ([address]) 2023   • [place] (hard of hearing) 2023   • Fever 2023   • Chest pain 2021   • Gastroesophageal reflux disease without esophagitis 2021   • HLD (hyperlipidemia) 2021   • Restless legs 2021   • Mild persistent asthma without complication    • Abnormal CT scan of head 2021      LOS (days): 5  Geometric Mean LOS (GMLOS) (days): 1.80  Days to GMLOS:-3     OBJECTIVE:  Risk of Unplanned Readmission Score: 20.05         Current admission status: Inpatient   Preferred Pharmacy:   [address] [place] [address] [place] [address]  Phone: [phone] Fax: [phone]    Primary Care Provider: Dejuan Tamez DO    Primary Insurance: MEDICARE  Secondary Insurance: [facility]    DISCHARGE DETAILS:                         Additional Comments: CM spoke to pt in regards to surgical procedure he was scheduled to recieve on friday. CM stated because pt does have secure covergae or housing, pt does not qualify for surgical procedure per surgoen. CM explained that pt medicare will only cover for rehab if pt meets criteria, and we won't know that until pt has surgery. Pt stated " This is going to kill me" and shut door in CM face. CM notified Physician Assistant. CM will continue to follow as needed.

## 2023-07-13 PROBLEM — R50.9 FEVER: Status: RESOLVED | Noted: 2023-07-06 | Resolved: 2023-07-13

## 2023-07-13 LAB
ABO GROUP BLD: NORMAL
ANION GAP SERPL CALCULATED.3IONS-SCNC: 1 MMOL/L
BACTERIA BLD CULT: NORMAL
BACTERIA BLD CULT: NORMAL
BLD GP AB SCN SERPL QL: NEGATIVE
BUN SERPL-MCNC: 15 MG/DL (ref 5–25)
CALCIUM SERPL-MCNC: 8.8 MG/DL (ref 8.3–10.1)
CHLORIDE SERPL-SCNC: 110 MMOL/L (ref 96–108)
CO2 SERPL-SCNC: 27 MMOL/L (ref 21–32)
CREAT SERPL-MCNC: 1.27 MG/DL (ref 0.6–1.3)
GFR SERPL CREATININE-BSD FRML MDRD: 57 ML/MIN/1.73SQ M
GLUCOSE SERPL-MCNC: 83 MG/DL (ref 65–140)
POTASSIUM SERPL-SCNC: 3.8 MMOL/L (ref 3.5–5.3)
RH BLD: POSITIVE
SODIUM SERPL-SCNC: 138 MMOL/L (ref 135–147)
SPECIMEN EXPIRATION DATE: NORMAL

## 2023-07-13 PROCEDURE — 86901 BLOOD TYPING SEROLOGIC RH(D): CPT | Performed by: PHYSICIAN ASSISTANT

## 2023-07-13 PROCEDURE — 99232 SBSQ HOSP IP/OBS MODERATE 35: CPT | Performed by: INTERNAL MEDICINE

## 2023-07-13 PROCEDURE — 99233 SBSQ HOSP IP/OBS HIGH 50: CPT | Performed by: INTERNAL MEDICINE

## 2023-07-13 PROCEDURE — 80048 BASIC METABOLIC PNL TOTAL CA: CPT | Performed by: STUDENT IN AN ORGANIZED HEALTH CARE EDUCATION/TRAINING PROGRAM

## 2023-07-13 PROCEDURE — 86850 RBC ANTIBODY SCREEN: CPT | Performed by: PHYSICIAN ASSISTANT

## 2023-07-13 PROCEDURE — 86900 BLOOD TYPING SEROLOGIC ABO: CPT | Performed by: PHYSICIAN ASSISTANT

## 2023-07-13 RX ORDER — CLOPIDOGREL BISULFATE 75 MG/1
75 TABLET ORAL DAILY
Status: DISCONTINUED | OUTPATIENT
Start: 2023-07-14 | End: 2023-07-17 | Stop reason: HOSPADM

## 2023-07-13 RX ORDER — ASPIRIN 81 MG/1
324 TABLET, CHEWABLE ORAL ONCE
Status: CANCELLED | OUTPATIENT
Start: 2023-07-13 | End: 2023-07-13

## 2023-07-13 RX ORDER — CLOPIDOGREL BISULFATE 75 MG/1
600 TABLET ORAL ONCE
Status: COMPLETED | OUTPATIENT
Start: 2023-07-13 | End: 2023-07-13

## 2023-07-13 RX ORDER — ALPRAZOLAM 0.25 MG/1
0.25 TABLET ORAL ONCE
Status: COMPLETED | OUTPATIENT
Start: 2023-07-13 | End: 2023-07-13

## 2023-07-13 RX ORDER — SODIUM CHLORIDE 9 MG/ML
125 INJECTION, SOLUTION INTRAVENOUS CONTINUOUS
Status: CANCELLED | OUTPATIENT
Start: 2023-07-13

## 2023-07-13 RX ADMIN — EZETIMIBE 10 MG: 10 TABLET ORAL at 21:10

## 2023-07-13 RX ADMIN — OXYCODONE HYDROCHLORIDE 5 MG: 5 TABLET ORAL at 12:54

## 2023-07-13 RX ADMIN — HEPARIN SODIUM 5000 UNITS: 5000 INJECTION INTRAVENOUS; SUBCUTANEOUS at 15:13

## 2023-07-13 RX ADMIN — ISOSORBIDE MONONITRATE 30 MG: 30 TABLET, EXTENDED RELEASE ORAL at 08:53

## 2023-07-13 RX ADMIN — GUAIFENESIN 600 MG: 600 TABLET, EXTENDED RELEASE ORAL at 08:53

## 2023-07-13 RX ADMIN — HEPARIN SODIUM 5000 UNITS: 5000 INJECTION INTRAVENOUS; SUBCUTANEOUS at 06:21

## 2023-07-13 RX ADMIN — FLUTICASONE FUROATE AND VILANTEROL TRIFENATATE 1 PUFF: 200; 25 POWDER RESPIRATORY (INHALATION) at 08:54

## 2023-07-13 RX ADMIN — OXYCODONE HYDROCHLORIDE 5 MG: 5 TABLET ORAL at 18:06

## 2023-07-13 RX ADMIN — LUBIPROSTONE 8 MCG: 8 CAPSULE, GELATIN COATED ORAL at 08:54

## 2023-07-13 RX ADMIN — ATORVASTATIN CALCIUM 80 MG: 80 TABLET, FILM COATED ORAL at 21:10

## 2023-07-13 RX ADMIN — ALPRAZOLAM 0.25 MG: 0.25 TABLET ORAL at 12:54

## 2023-07-13 RX ADMIN — PANTOPRAZOLE SODIUM 40 MG: 40 TABLET, DELAYED RELEASE ORAL at 15:13

## 2023-07-13 RX ADMIN — CLOPIDOGREL BISULFATE 600 MG: 75 TABLET ORAL at 12:53

## 2023-07-13 RX ADMIN — PANTOPRAZOLE SODIUM 40 MG: 40 TABLET, DELAYED RELEASE ORAL at 06:21

## 2023-07-13 RX ADMIN — TEMAZEPAM 15 MG: 15 CAPSULE ORAL at 21:19

## 2023-07-13 RX ADMIN — GUAIFENESIN 600 MG: 600 TABLET, EXTENDED RELEASE ORAL at 21:10

## 2023-07-13 RX ADMIN — ASPIRIN 81 MG CHEWABLE TABLET 81 MG: 81 TABLET CHEWABLE at 08:53

## 2023-07-13 RX ADMIN — PRAMIPEXOLE DIHYDROCHLORIDE 0.25 MG: 0.25 TABLET ORAL at 18:06

## 2023-07-13 RX ADMIN — HYDROCORTISONE: 1 CREAM TOPICAL at 18:04

## 2023-07-13 RX ADMIN — Medication 10 MG: at 08:53

## 2023-07-13 RX ADMIN — OXYCODONE HYDROCHLORIDE 5 MG: 5 TABLET ORAL at 06:21

## 2023-07-13 RX ADMIN — CARBAMIDE PEROXIDE 6.5% 5 DROP: 6.5 LIQUID AURICULAR (OTIC) at 08:55

## 2023-07-13 RX ADMIN — HEPARIN SODIUM 5000 UNITS: 5000 INJECTION INTRAVENOUS; SUBCUTANEOUS at 21:10

## 2023-07-13 RX ADMIN — POLYETHYLENE GLYCOL 3350 17 G: 17 POWDER, FOR SOLUTION ORAL at 08:53

## 2023-07-13 RX ADMIN — METOPROLOL SUCCINATE 50 MG: 50 TABLET, EXTENDED RELEASE ORAL at 08:53

## 2023-07-13 NOTE — PROGRESS NOTES
INTERNAL MEDICINE RESIDENCY PROGRESS NOTE     Name: Kristian Gerardo   Age & Sex: 69 y.o. male   MRN: [id number]  Unit/Bed#: [facility] 419-01   Encounter: [id number]  Team: [id number]    PATIENT INFORMATION     Name: Kristian Gerardo   Age & Sex: 69 y.o. male   MRN: [address] Stay Days: 6    ASSESSMENT/PLAN     Principal Problem:    Coronary artery disease  Active Problems:    Gastroesophageal reflux disease without esophagitis    HLD (hyperlipidemia)    Restless legs    Mild persistent asthma without complication    Uncomplicated opioid dependence ([address])    [place] (hard of hearing)    Stage 3a chronic kidney disease ([address])    Primary hypertension    Irritable bowel syndrome with constipation      * Coronary artery disease  Assessment & Plan  SLA 7/6 with 1 month progressive exertional chest discomfort  Dec 2021- inf MI s/p MERRITT to dRCA residual mLAD  Cath Jan 2022 and June 2022  LVH June 2023- nonischemic, 7/7/23 cath- [facility] LAD 90% stenosed   SLB for cardiac surgery CABG eval    VAS carotid <50% stenosis  CT chest- normal caliber aorta, no significant atherosclerotic, no pericardial effusion/adhesion, mild edema  Echo- 50% EF    Plan:  ASA 81, atorva 80 mg , toprol 50 mg daily  Plavix and Losartan held  Plavix load  Plan for High risk PCI tomorrow  CABG cancelled due to social situation        Irritable bowel syndrome with constipation  Assessment & Plan  IBS-C on Linzess  BM every 4 days   Denies abd pain or diarrhea or constipation at this time      Primary hypertension  Assessment & Plan  -149  Losartan 50 mg HELD given Cr elevation and pending CABG    Continue Toprol 50 mg daily      Stage 3a chronic kidney disease [facility])  Assessment & Plan  Lab Results   Component Value Date    EGFR 54 07/12/2023    EGFR 58 07/11/2023    EGFR 60 07/10/2023    CREATININE 1.33 (H) 07/12/2023    CREATININE 1.24 07/11/2023    CREATININE 1.22 07/10/2023     Baseline 1.1-1.3  Losartan held  Avoid nephrotoxins and hypotension  Encouraged oral hydration     [place] (hard of hearing)  Assessment & Plan  Noted    Uncomplicated opioid dependence ([address])  Assessment & Plan  Verified on PDMP  Continue oxy IR 5 mg TID      Mild persistent asthma without complication  Assessment & Plan  Continue breo with PRN albuterol    Restless legs  Assessment & Plan  Continue Pramipexole 0.25 mg daily    HLD (hyperlipidemia)  Assessment & Plan  On Atorvastatin 80 mg daily  Zetia 10 mg daily    Gastroesophageal reflux disease without esophagitis  Assessment & Plan  Continue Protonix 40 mg BID      Fever-resolved as of 7/13/2023  Assessment & Plan  • Intermittent fever  • A few days ago patient had a right ear pressure, right-sided sinus pain/pressure, later developed postnasal drip and productive cough  • Suspect sinus infection, viral upper respiratory tract  infection  • Pro-Ramez -negative x2  • Chest x-ray 7/6 negative  • COVID/flu/RSV negative  • Blood cultures negative at 24 hours  • CT chest showed mild edema, minimal dependent atelectasis in the lower lobes, no consolidation  • Afrin significantly improved his symptoms, relieving sinus pressure  • Continue doxycycline for possible sinus infection, he has penicillin allergy and not sure if he ever had cephalosporins      Currently afebrile for 24 hrs without leukocytosis    Day 5 out of 5 doxycycline, complete  • Mucinex, Tessalon Perles  • Incentive spirometry  • Continue Afrin      Disposition: Pending PCI      SUBJECTIVE     Patient seen and examined. No acute events overnight. Denies chest pain or SOB, ambulatory. Patient states he is willing to undergo surgery and rehab and was looking into apartments given homeless status.        OBJECTIVE     Vitals:    07/12/23 2236 07/13/23 0241 07/13/23 0648 07/13/23 1045   BP: 145/68 134/61 144/77 148/72   BP Location: Right arm Right arm     Pulse: 56 (!) 54 56 62   Resp: 16 16 18 19   Temp: 98.8 °F (37.1 °C) 97.9 °F (36.6 °C) 97.9 °F (36.6 °C) 98.5 °F (36.9 °C)   TempSrc: Oral Oral     SpO2: 97% 97% 98% 96%   Weight:       Height:          Temperature:   Temp (24hrs), Av.5 °F (36.9 °C), Min:97.9 °F (36.6 °C), Max:99 °F (37.2 °C)    Temperature: 98.5 °F (36.9 °C)  Intake & Output:  I/O        07 07 07 07 07 0700    P. O. 464 520 440    Total Intake(mL/kg) 464 (5.6) 520 (6.2) 440 (5.2)    Urine (mL/kg/hr) 200 (0.1)      Total Output 200      Net +264 +520 +440               Weights:   IBW (Ideal Body Weight): 77.6 kg    Body mass index is 25.24 kg/m². Weight (last 2 days)     Date/Time Weight    23 1300 84.4 (186.07)        Physical Exam  Vitals reviewed. Constitutional:       General: He is not in acute distress. HENT:      Head: Normocephalic and atraumatic. Eyes:      Extraocular Movements: Extraocular movements intact. Conjunctiva/sclera: Conjunctivae normal.   Cardiovascular:      Rate and Rhythm: Normal rate and regular rhythm. Pulses: Normal pulses. Heart sounds: Normal heart sounds. Pulmonary:      Effort: Pulmonary effort is normal.      Breath sounds: Normal breath sounds. Abdominal:      Palpations: Abdomen is soft. Tenderness: There is no abdominal tenderness. Musculoskeletal:         General: Normal range of motion. Right lower leg: No edema. Left lower leg: No edema. Neurological:      Mental Status: He is alert and oriented to person, place, and time. LABORATORY DATA     Labs: I have personally reviewed pertinent reports.   Results from last 7 days   Lab Units 07/10/23  0519 23  0507 23  0536 23  0541 23  1953   WBC Thousand/uL 7.59 9.57 10.11 10.96* 11.08*   HEMOGLOBIN g/dL 10.6* 11.0* 11.3* 11.3* 10.4*   HEMATOCRIT % 32.4* 34.5* 35.2* 35.2* 32.7*   PLATELETS Thousands/uL 232 207 209 208 202   NEUTROS PCT %  --   --  66 60 62   MONOS PCT %  --   --  10 9 10   EOS PCT %  --   --  2 3 3      Results from last 7 days Lab Units 07/13/23  0445 07/12/23  0514 07/11/23  0441 07/07/23  0541 07/1953   POTASSIUM mmol/L 3.8 3.9 3.8   < > 5.1   CHLORIDE mmol/L 110* 111* 112*   < > 109*   CO2 mmol/L 27 27 27   < > 24   BUN mg/dL 15 17 15   < > 28*   CREATININE mg/dL 1.27 1.33* 1.24   < > 1.62*   CALCIUM mg/dL 8.8 8.9 8.6   < > 8.5   ALK PHOS U/L  --   --   --   --  59   ALT U/L  --   --   --   --  15   AST U/L  --   --   --   --  28    < > = values in this interval not displayed. Results from last 7 days   Lab Units 07/08/23  0536   MAGNESIUM mg/dL 1.9                        IMAGING & DIAGNOSTIC TESTING     Radiology Results: I have personally reviewed pertinent reports. CT chest wo contrast    Result Date: 7/8/2023  Impression: Normal caliber ascending aorta with no significant atherosclerotic calcification. No pericardial effusion or adhesion. Mild edema. Workstation performed: [id number]     Other Diagnostic Testing: I have personally reviewed pertinent reports.     ACTIVE MEDICATIONS     Current Facility-Administered Medications   Medication Dose Route Frequency   • acetaminophen (TYLENOL) tablet 975 mg  975 mg Oral Q6H PRN   • albuterol (PROVENTIL HFA,VENTOLIN HFA) inhaler 2 puff  2 puff Inhalation Q4H PRN   • aluminum-magnesium hydroxide-simethicone (MAALOX) oral suspension 30 mL  30 mL Oral Q6H PRN   • aspirin chewable tablet 81 mg  81 mg Oral Daily   • atorvastatin (LIPITOR) tablet 80 mg  80 mg Oral HS   • benzonatate (TESSALON PERLES) capsule 100 mg  100 mg Oral TID PRN   • bisacodyl (DULCOLAX) rectal suppository 10 mg  10 mg Rectal Daily   • carbamide peroxide (DEBROX) 6.5 % otic solution 5 drop  5 drop Both Ears BID   • [START ON 7/14/2023] clopidogrel (PLAVIX) tablet 75 mg  75 mg Oral Daily   • ezetimibe (ZETIA) tablet 10 mg  10 mg Oral HS   • fluticasone (FLONASE) 50 mcg/act nasal spray 2 spray  2 spray Nasal Daily PRN   • fluticasone-vilanterol 200-25 mcg/actuation 1 puff  1 puff Inhalation Daily   • guaiFENesin (MUCINEX) 12 hr tablet 600 mg  600 mg Oral Q12H [address]   • heparin (porcine) subcutaneous injection 5,000 Units  5,000 Units Subcutaneous Q8H [address]   • hydrocortisone 1 % cream   Topical BID   • hydrOXYzine HCL (ATARAX) tablet 50 mg  50 mg Oral TID PRN   • isosorbide mononitrate (IMDUR) 24 hr tablet 30 mg  30 mg Oral Daily   • lubiprostone (AMITIZA) capsule 8 mcg  8 mcg Oral BID   • metoprolol succinate (TOPROL-XL) 24 hr tablet 50 mg  50 mg Oral Daily   • nitroglycerin (NITROSTAT) SL tablet 0.4 mg  0.4 mg Sublingual Q5 Min PRN   • ondansetron (ZOFRAN) injection 4 mg  4 mg Intravenous Q6H PRN   • oxyCODONE (ROXICODONE) IR tablet 5 mg  5 mg Oral TID   • pantoprazole (PROTONIX) EC tablet 40 mg  40 mg Oral BID AC   • polyethylene glycol (MIRALAX) packet 17 g  17 g Oral Daily   • pramipexole (MIRAPEX) tablet 0.25 mg  0.25 mg Oral After Dinner   • simethicone (MYLICON) chewable tablet 80 mg  80 mg Oral 4x Daily PRN   • temazepam (RESTORIL) capsule 15 mg  15 mg Oral HS PRN       VTE Pharmacologic Prophylaxis: Heparin  VTE Mechanical Prophylaxis: sequential compression device    Portions of the record may have been created with voice recognition software. Occasional wrong word or "sound a like" substitutions may have occurred due to the inherent limitations of voice recognition software.   Read the chart carefully and recognize, using context, where substitutions have occurred.  ==  Jamie Middleton MD  [address]  Internal Medicine Residency PGY-2

## 2023-07-13 NOTE — PROGRESS NOTES
Cardiology Progress Note - Rowena Lefort 69 y.o. male MRN: [id number]    Unit/Bed#: [facility] 419-01 Encounter: [id number]        Hospital Problems:  Principal Problem:    Coronary artery disease  Active Problems:    Gastroesophageal reflux disease without esophagitis    HLD (hyperlipidemia)    Restless legs    Mild persistent asthma without complication    Uncomplicated opioid dependence (HCC)    [place] (hard of hearing)    Fever    Stage 3a chronic kidney disease (HCC)    Primary hypertension    Irritable bowel syndrome with constipation      Assessment/Recommendations:    Severe ostial LAD disease, patent mid LAD, circumflex and RCA stents  Initially CABG with LIMA to the LAD was planned for 7/14/2023. Surgery cancelled due to social issues (see cardiac surgery notes and discussion)  Discussed high risk PCI of ostial LAD with Dr. Juancho Ozuna. Procedure details, risks of PCI discussed with the patient. Schedule for PCI tomorrow. Cath lab made aware. Load with clopidogrel today. Creat is 1.27. Continue current medical therapy. Anxiety  Requesting something to calm hie nerves- one dose of low dose alprazolam given. Dyslipidemia  Tolerating high intensity statins and Zetia. Hypertension  BP controlled. Sinusitis  Improving on antibiotics. IBS  Reports constipation and abdominal distention. Also reports nausea. Was given stool softeners. Subjective:     Overnight events reviewed. No angina reported. Cardiac surgery notes reviewed. Reports nausea constipation. Reports a great deal of anxiety related to cancellation of his surgery. Review of Systems   Constitutional: Positive for fatigue. HENT: Positive for hearing loss. Eyes: Negative. Respiratory: Negative for cough and shortness of breath. Cardiovascular: Negative for chest pain and palpitations. Gastrointestinal: Positive for abdominal distention, constipation and nausea. Endocrine: Negative. Genitourinary: Negative. Musculoskeletal: Negative. Skin: Negative. Allergic/Immunologic: Negative. Neurological: Negative. Hematological: Does not bruise/bleed easily. Psychiatric/Behavioral: Positive for dysphoric mood and sleep disturbance. The patient is nervous/anxious. All other systems reviewed and are negative. Review of ten system was conducted and was negative except for findings stated elsewhere in the note.         Current Facility-Administered Medications:   •  acetaminophen (TYLENOL) tablet 975 mg, 975 mg, Oral, Q6H PRN, Nay Alanis MD, 975 mg at 07/12/23 2100  •  albuterol (PROVENTIL HFA,VENTOLIN HFA) inhaler 2 puff, 2 puff, Inhalation, Q4H PRN, Nay Alanis MD  •  ALPRAZolam Corena Brooking) tablet 0.25 mg, 0.25 mg, Oral, Once, Jocelyn Knapp MD  •  aluminum-magnesium hydroxide-simethicone (MAALOX) oral suspension 30 mL, 30 mL, Oral, Q6H PRN, Nay Alanis MD  •  aspirin chewable tablet 81 mg, 81 mg, Oral, Daily, Nay Alanis MD, 81 mg at 07/13/23 0853  •  atorvastatin (LIPITOR) tablet 80 mg, 80 mg, Oral, HS, Nay Alanis MD, 80 mg at 07/12/23 2100  •  benzonatate (TESSALON PERLES) capsule 100 mg, 100 mg, Oral, TID PRN, Fredi Boggs MD, 100 mg at 07/11/23 1714  •  bisacodyl (DULCOLAX) rectal suppository 10 mg, 10 mg, Rectal, Daily, Kishore Slater DO, 10 mg at 07/13/23 0853  •  carbamide peroxide (DEBROX) 6.5 % otic solution 5 drop, 5 drop, Both Ears, BID, Kishore Slater DO, 5 drop at 07/13/23 0855  •  clopidogrel (PLAVIX) tablet 600 mg, 600 mg, Oral, Once, Jocelyn Knapp MD  •  [START ON 7/14/2023] clopidogrel (PLAVIX) tablet 75 mg, 75 mg, Oral, Daily, Jocelyn Knapp MD  •  ezetimibe (ZETIA) tablet 10 mg, 10 mg, Oral, HS, Nay Alanis MD, 10 mg at 07/12/23 2100  •  fluticasone (FLONASE) 50 mcg/act nasal spray 2 spray, 2 spray, Nasal, Daily PRN, Nay Alanis MD  •  fluticasone-vilanterol 200-25 mcg/actuation 1 puff, 1 puff, Inhalation, Daily, Nay Alanis MD, 1 puff at 07/13/23 0854  •  guaiFENesin (MUCINEX) 12 hr tablet 600 mg, 600 mg, Oral, Q12H [facility], Evelyn Wang MD, 600 mg at 07/13/23 0853  •  heparin (porcine) subcutaneous injection 5,000 Units, 5,000 Units, Subcutaneous, Q8H [facility], Geovanna Baxter MD, 5,000 Units at 07/13/23 4746  •  hydrocortisone 1 % cream, , Topical, BID, Geovanna Baxter MD, Given at 07/12/23 1732  •  hydrOXYzine HCL (ATARAX) tablet 50 mg, 50 mg, Oral, TID PRN, Geovanna Baxter MD  •  isosorbide mononitrate (IMDUR) 24 hr tablet 30 mg, 30 mg, Oral, Daily, Geovanna Baxter MD, 30 mg at 07/13/23 5977  •  lubiprostone (AMITIZA) capsule 8 mcg, 8 mcg, Oral, BID, Geovanna Baxter MD, 8 mcg at 07/13/23 0854  •  metoprolol succinate (TOPROL-XL) 24 hr tablet 50 mg, 50 mg, Oral, Daily, Geovanna Baxter MD, 50 mg at 07/13/23 0853  •  nitroglycerin (NITROSTAT) SL tablet 0.4 mg, 0.4 mg, Sublingual, Q5 Min PRN, Geovanna Baxter MD  •  ondansetron [facility]) injection 4 mg, 4 mg, Intravenous, Q6H PRN, Geovanna Baxter MD  •  oxyCODONE (ROXICODONE) IR tablet 5 mg, 5 mg, Oral, TID, Luis Felipe White MD, 5 mg at 07/13/23 2488  •  pantoprazole (PROTONIX) EC tablet 40 mg, 40 mg, Oral, BID AC, Geovanna Baxter MD, 40 mg at 07/13/23 2212  •  polyethylene glycol (MIRALAX) packet 17 g, 17 g, Oral, Daily, Kishore Slater DO, 17 g at 07/13/23 1751  •  pramipexole (MIRAPEX) tablet 0.25 mg, 0.25 mg, Oral, After Yvette Donohue MD, 0.25 mg at 07/12/23 1724  •  simethicone (MYLICON) chewable tablet 80 mg, 80 mg, Oral, 4x Daily PRN, Geovanna Baxter MD  •  temazepam (RESTORIL) capsule 15 mg, 15 mg, Oral, HS PRN, Moses Cortez PA-C, 15 mg at 07/12/23 2100     Objective:     Vitals:   Blood pressure 148/72, pulse 62, temperature 98.5 °F (36.9 °C), resp. rate 19, height 6' (1.829 m), weight 84.4 kg (186 lb 1.1 oz), SpO2 96 %. Body mass index is 25.24 kg/m².   Orthostatic Blood Pressures    Flowsheet Row Most Recent Value   Blood Pressure 148/72 filed at 07/13/2023 1045   Patient Position - Orthostatic VS Lying filed at 2023 0481         Systolic (01YSP), OMV:487 , Min:134 , JDQ:942     Diastolic (50ITS), [place], Min:61, Max:77      Intake/Output Summary (Last 24 hours) at 2023 1137  Last data filed at 2023 1046  Gross per 24 hour   Intake 840 ml   Output --   Net 840 ml     Weight (last 2 days)     Date/Time Weight    23 1300 84.4 (186.07)            Physical Exam  Vitals reviewed. Constitutional:       General: He is not in acute distress. HENT:      Head: Normocephalic. Cardiovascular:      Rate and Rhythm: Normal rate and regular rhythm. Heart sounds: S1 normal and S2 normal. No murmur heard. Pulmonary:      Breath sounds: No wheezing or rhonchi. Musculoskeletal:      Right lower leg: No edema. Left lower leg: No edema. Skin:     General: Skin is warm. Neurological:      Mental Status: He is alert. Mental status is at baseline. Psychiatric:      Comments: Anxious           Labs & Results:    Lab Results   Component Value Date    SODIUM 138 2023    K 3.8 2023     (H) 2023    CO2 27 2023    BUN 15 2023    CREATININE 1.27 2023    GLUC 83 2023    CALCIUM 8.8 2023     Lab Results   Component Value Date    WBC 7.59 07/10/2023    RBC 3.62 (L) 07/10/2023    HGB 10.6 (L) 07/10/2023    HCT 32.4 (L) 07/10/2023    MCV 90 07/10/2023    MCH 29.3 07/10/2023    RDW 12.7 07/10/2023     07/10/2023         Lab Results   Component Value Date    LDLCALC 40 2023     Lab Results   Component Value Date    [id number] 2.985 2021           Available cardiac and imaging studies were reviewed independently. Available cardiology studies, imaging and lab results independently reviewed today. This note was completed in part utilizing voice recognition software.    Grammatical errors, random word insertion, spelling mistakes, occasional wrong word or "sound-alike" substitutions and incomplete sentences may be an occasional consequence of the system secondary to software limitations, ambient noise and hardware issues. At the time of dictation, efforts were made to edit, clarify and /or correct errors. Please read the chart carefully and recognize, using context, where substitutions have occurred. If you have any questions or concerns about the context, text or information contained within the body of this dictation, please contact myself, the provider, for further clarification.

## 2023-07-14 ENCOUNTER — APPOINTMENT (INPATIENT)
Dept: NON INVASIVE DIAGNOSTICS | Facility: HOSPITAL | Age: 68
DRG: 247 | End: 2023-07-14
Attending: THORACIC SURGERY (CARDIOTHORACIC VASCULAR SURGERY)
Payer: MEDICARE

## 2023-07-14 LAB
ANION GAP SERPL CALCULATED.3IONS-SCNC: 5 MMOL/L
BUN SERPL-MCNC: 12 MG/DL (ref 5–25)
CALCIUM SERPL-MCNC: 9.1 MG/DL (ref 8.3–10.1)
CHLORIDE SERPL-SCNC: 109 MMOL/L (ref 96–108)
CO2 SERPL-SCNC: 27 MMOL/L (ref 21–32)
CREAT SERPL-MCNC: 1.2 MG/DL (ref 0.6–1.3)
GFR SERPL CREATININE-BSD FRML MDRD: 61 ML/MIN/1.73SQ M
GLUCOSE SERPL-MCNC: 80 MG/DL (ref 65–140)
POTASSIUM SERPL-SCNC: 3.5 MMOL/L (ref 3.5–5.3)
SODIUM SERPL-SCNC: 141 MMOL/L (ref 135–147)

## 2023-07-14 PROCEDURE — 85347 COAGULATION TIME ACTIVATED: CPT

## 2023-07-14 PROCEDURE — C1725 CATH, TRANSLUMIN NON-LASER: HCPCS | Performed by: INTERNAL MEDICINE

## 2023-07-14 PROCEDURE — C1769 GUIDE WIRE: HCPCS | Performed by: INTERNAL MEDICINE

## 2023-07-14 PROCEDURE — C1753 CATH, INTRAVAS ULTRASOUND: HCPCS | Performed by: INTERNAL MEDICINE

## 2023-07-14 PROCEDURE — C1887 CATHETER, GUIDING: HCPCS | Performed by: INTERNAL MEDICINE

## 2023-07-14 PROCEDURE — 80048 BASIC METABOLIC PNL TOTAL CA: CPT | Performed by: STUDENT IN AN ORGANIZED HEALTH CARE EDUCATION/TRAINING PROGRAM

## 2023-07-14 PROCEDURE — 92928 PRQ TCAT PLMT NTRAC ST 1 LES: CPT | Performed by: INTERNAL MEDICINE

## 2023-07-14 PROCEDURE — 99153 MOD SED SAME PHYS/QHP EA: CPT | Performed by: INTERNAL MEDICINE

## 2023-07-14 PROCEDURE — 93454 CORONARY ARTERY ANGIO S&I: CPT | Performed by: INTERNAL MEDICINE

## 2023-07-14 PROCEDURE — 92920 PRQ TRLUML C ANGIOP 1ART&/BR: CPT | Performed by: INTERNAL MEDICINE

## 2023-07-14 PROCEDURE — C9600 PERC DRUG-EL COR STENT SING: HCPCS | Performed by: INTERNAL MEDICINE

## 2023-07-14 PROCEDURE — 99152 MOD SED SAME PHYS/QHP 5/>YRS: CPT | Performed by: INTERNAL MEDICINE

## 2023-07-14 PROCEDURE — C1894 INTRO/SHEATH, NON-LASER: HCPCS | Performed by: INTERNAL MEDICINE

## 2023-07-14 PROCEDURE — 027034Z DILATION OF CORONARY ARTERY, ONE ARTERY WITH DRUG-ELUTING INTRALUMINAL DEVICE, PERCUTANEOUS APPROACH: ICD-10-PCS | Performed by: INTERNAL MEDICINE

## 2023-07-14 PROCEDURE — 99232 SBSQ HOSP IP/OBS MODERATE 35: CPT | Performed by: INTERNAL MEDICINE

## 2023-07-14 PROCEDURE — 92978 ENDOLUMINL IVUS OCT C 1ST: CPT | Performed by: INTERNAL MEDICINE

## 2023-07-14 PROCEDURE — 99233 SBSQ HOSP IP/OBS HIGH 50: CPT | Performed by: INTERNAL MEDICINE

## 2023-07-14 PROCEDURE — C1874 STENT, COATED/COV W/DEL SYS: HCPCS | Performed by: INTERNAL MEDICINE

## 2023-07-14 DEVICE — XIENCE SKYPOINT™ EVEROLIMUS ELUTING CORONARY STENT SYSTEM 3.50 MM X 23 MM / RAPID-EXCHANGE
Type: IMPLANTABLE DEVICE | Status: FUNCTIONAL
Brand: XIENCE SKYPOINT™

## 2023-07-14 RX ORDER — SODIUM CHLORIDE 9 MG/ML
125 INJECTION, SOLUTION INTRAVENOUS CONTINUOUS
Status: DISCONTINUED | OUTPATIENT
Start: 2023-07-14 | End: 2023-07-14

## 2023-07-14 RX ORDER — NITROGLYCERIN 20 MG/100ML
INJECTION INTRAVENOUS CODE/TRAUMA/SEDATION MEDICATION
Status: DISCONTINUED | OUTPATIENT
Start: 2023-07-14 | End: 2023-07-14 | Stop reason: HOSPADM

## 2023-07-14 RX ORDER — ALPRAZOLAM 0.25 MG/1
0.25 TABLET ORAL ONCE AS NEEDED
Status: COMPLETED | OUTPATIENT
Start: 2023-07-14 | End: 2023-07-14

## 2023-07-14 RX ORDER — VERAPAMIL HCL 2.5 MG/ML
AMPUL (ML) INTRAVENOUS CODE/TRAUMA/SEDATION MEDICATION
Status: DISCONTINUED | OUTPATIENT
Start: 2023-07-14 | End: 2023-07-14 | Stop reason: HOSPADM

## 2023-07-14 RX ORDER — MIDAZOLAM HYDROCHLORIDE 2 MG/2ML
INJECTION, SOLUTION INTRAMUSCULAR; INTRAVENOUS CODE/TRAUMA/SEDATION MEDICATION
Status: DISCONTINUED | OUTPATIENT
Start: 2023-07-14 | End: 2023-07-14 | Stop reason: HOSPADM

## 2023-07-14 RX ORDER — LIDOCAINE HYDROCHLORIDE 10 MG/ML
INJECTION, SOLUTION EPIDURAL; INFILTRATION; INTRACAUDAL; PERINEURAL CODE/TRAUMA/SEDATION MEDICATION
Status: DISCONTINUED | OUTPATIENT
Start: 2023-07-14 | End: 2023-07-14 | Stop reason: HOSPADM

## 2023-07-14 RX ORDER — SODIUM CHLORIDE 9 MG/ML
125 INJECTION, SOLUTION INTRAVENOUS CONTINUOUS
Status: DISPENSED | OUTPATIENT
Start: 2023-07-14 | End: 2023-07-14

## 2023-07-14 RX ORDER — FENTANYL CITRATE 50 UG/ML
INJECTION, SOLUTION INTRAMUSCULAR; INTRAVENOUS CODE/TRAUMA/SEDATION MEDICATION
Status: DISCONTINUED | OUTPATIENT
Start: 2023-07-14 | End: 2023-07-14 | Stop reason: HOSPADM

## 2023-07-14 RX ORDER — HEPARIN SODIUM 1000 [USP'U]/ML
INJECTION, SOLUTION INTRAVENOUS; SUBCUTANEOUS CODE/TRAUMA/SEDATION MEDICATION
Status: DISCONTINUED | OUTPATIENT
Start: 2023-07-14 | End: 2023-07-14 | Stop reason: HOSPADM

## 2023-07-14 RX ADMIN — ISOSORBIDE MONONITRATE 30 MG: 30 TABLET, EXTENDED RELEASE ORAL at 09:10

## 2023-07-14 RX ADMIN — GUAIFENESIN 600 MG: 600 TABLET, EXTENDED RELEASE ORAL at 21:37

## 2023-07-14 RX ADMIN — LUBIPROSTONE 8 MCG: 8 CAPSULE, GELATIN COATED ORAL at 09:09

## 2023-07-14 RX ADMIN — CLOPIDOGREL BISULFATE 75 MG: 75 TABLET ORAL at 09:10

## 2023-07-14 RX ADMIN — GUAIFENESIN 600 MG: 600 TABLET, EXTENDED RELEASE ORAL at 09:10

## 2023-07-14 RX ADMIN — METOPROLOL SUCCINATE 50 MG: 50 TABLET, EXTENDED RELEASE ORAL at 09:10

## 2023-07-14 RX ADMIN — HEPARIN SODIUM 5000 UNITS: 5000 INJECTION INTRAVENOUS; SUBCUTANEOUS at 21:36

## 2023-07-14 RX ADMIN — OXYCODONE HYDROCHLORIDE 5 MG: 5 TABLET ORAL at 17:00

## 2023-07-14 RX ADMIN — ATORVASTATIN CALCIUM 80 MG: 80 TABLET, FILM COATED ORAL at 21:36

## 2023-07-14 RX ADMIN — PANTOPRAZOLE SODIUM 40 MG: 40 TABLET, DELAYED RELEASE ORAL at 17:01

## 2023-07-14 RX ADMIN — OXYCODONE HYDROCHLORIDE 5 MG: 5 TABLET ORAL at 11:49

## 2023-07-14 RX ADMIN — OXYCODONE HYDROCHLORIDE 5 MG: 5 TABLET ORAL at 05:58

## 2023-07-14 RX ADMIN — FLUTICASONE FUROATE AND VILANTEROL TRIFENATATE 1 PUFF: 200; 25 POWDER RESPIRATORY (INHALATION) at 09:10

## 2023-07-14 RX ADMIN — HYDROCORTISONE: 1 CREAM TOPICAL at 09:14

## 2023-07-14 RX ADMIN — LUBIPROSTONE 8 MCG: 8 CAPSULE, GELATIN COATED ORAL at 21:37

## 2023-07-14 RX ADMIN — SODIUM CHLORIDE 125 ML/HR: 0.9 INJECTION, SOLUTION INTRAVENOUS at 15:11

## 2023-07-14 RX ADMIN — PRAMIPEXOLE DIHYDROCHLORIDE 0.25 MG: 0.25 TABLET ORAL at 17:00

## 2023-07-14 RX ADMIN — ALPRAZOLAM 0.25 MG: 0.25 TABLET ORAL at 06:34

## 2023-07-14 RX ADMIN — PANTOPRAZOLE SODIUM 40 MG: 40 TABLET, DELAYED RELEASE ORAL at 06:03

## 2023-07-14 RX ADMIN — HEPARIN SODIUM 5000 UNITS: 5000 INJECTION INTRAVENOUS; SUBCUTANEOUS at 05:58

## 2023-07-14 RX ADMIN — EZETIMIBE 10 MG: 10 TABLET ORAL at 21:37

## 2023-07-14 RX ADMIN — ASPIRIN 81 MG CHEWABLE TABLET 81 MG: 81 TABLET CHEWABLE at 09:10

## 2023-07-14 NOTE — PLAN OF CARE
Problem: PAIN - ADULT  Goal: Verbalizes/displays adequate comfort level or baseline comfort level  Description: Interventions:  - Encourage patient to monitor pain and request assistance  - Assess pain using appropriate pain scale  - Administer analgesics based on type and severity of pain and evaluate response  - Implement non-pharmacological measures as appropriate and evaluate response  - Consider cultural and social influences on pain and pain management  - Notify physician/advanced practitioner if interventions unsuccessful or patient reports new pain  Outcome: Progressing     Problem: INFECTION - ADULT  Goal: Absence or prevention of progression during hospitalization  Description: INTERVENTIONS:  - Assess and monitor for signs and symptoms of infection  - Monitor lab/diagnostic results  - Monitor all insertion sites, i.e. indwelling lines, tubes, and drains  - Monitor endotracheal if appropriate and nasal secretions for changes in amount and color  - [place] appropriate cooling/warming therapies per order  - Administer medications as ordered  - Instruct and encourage patient and family to use good hand hygiene technique  - Identify and instruct in appropriate isolation precautions for identified infection/condition  Outcome: Progressing  Goal: Absence of fever/infection during neutropenic period  Description: INTERVENTIONS:  - Monitor WBC    Outcome: Progressing

## 2023-07-14 NOTE — PROGRESS NOTES
Cardiology Progress Note - Luca Urbina 69 y.o. male MRN: [id number]    Unit/Bed#: [facility] 419-01 Encounter: [id number]        Hospital Problems:  Principal Problem:    Coronary artery disease  Active Problems:    Gastroesophageal reflux disease without esophagitis    HLD (hyperlipidemia)    Restless legs    Mild persistent asthma without complication    Uncomplicated opioid dependence (HCC)    [place] (hard of hearing)    Stage 3a chronic kidney disease (HCC)    Primary hypertension    Irritable bowel syndrome with constipation      Assessment/Recommendations:    Severe ostial LAD disease, patent mid LAD, circumflex and RCA stents  Initially CABG with LIMA to the LAD was planned for 7/14/2023. Surgery cancelled due to social issues (see cardiac surgery notes and discussion)  Discussed high risk PCI of ostial LAD with patient and interventional cardiology. Procedure details, risks again reviewed with the patient. He has received clopidogrel loading dose yesterday. Continue dual antiplatelet therapy. Creatinine improved to 1.2. Telemetry shows sinus bradycardia and a 5 beat run of nonsustained VT. Plans for PCI of the ostial LAD today.     Anxiety  Improved with alprazolam.     Dyslipidemia  Tolerating high intensity statins and Zetia.     Further recommendations to follow after review of interventional procedure. Plan of care discussed with the patient in detail. All questions were answered. Discussed also with RN. Subjective:     Overnight events reviewed. Patient seen this morning with RN in the room. All questions regarding upcoming PCI procedure answered. No chest pain overnight. No worsening dyspnea. Creatinine improved to 1.2. Review of Systems   Constitutional: Positive for fatigue. Negative for fever. HENT: Positive for hearing loss. Respiratory: Negative for chest tightness, shortness of breath and wheezing.     Cardiovascular: Negative for chest pain, palpitations and leg swelling. Gastrointestinal: Positive for constipation. Skin: Negative for rash. Neurological: Negative for syncope. Hematological: Does not bruise/bleed easily. Psychiatric/Behavioral: Positive for sleep disturbance. The patient is nervous/anxious. All other systems reviewed and are negative. Review of ten system was conducted and was negative except for findings stated elsewhere in the note.         Current Facility-Administered Medications:   •  acetaminophen (TYLENOL) tablet 975 mg, 975 mg, Oral, Q6H PRN, Madyson Victoria MD, 975 mg at 07/12/23 2100  •  albuterol (PROVENTIL HFA,VENTOLIN HFA) inhaler 2 puff, 2 puff, Inhalation, Q4H PRN, Madyson Victoria MD  •  aluminum-magnesium hydroxide-simethicone (MAALOX) oral suspension 30 mL, 30 mL, Oral, Q6H PRN, Madyson Victoria MD  •  aspirin chewable tablet 81 mg, 81 mg, Oral, Daily, Madyson Victoria MD, 81 mg at 07/14/23 0910  •  atorvastatin (LIPITOR) tablet 80 mg, 80 mg, Oral, HS, Madyson Victoria MD, 80 mg at 07/13/23 2110  •  benzonatate (TESSALON PERLES) capsule 100 mg, 100 mg, Oral, TID PRN, Nikki Corona MD, 100 mg at 07/11/23 1714  •  bisacodyl (DULCOLAX) rectal suppository 10 mg, 10 mg, Rectal, Daily, Hetul Slater DO, 10 mg at 07/13/23 0853  •  carbamide peroxide (DEBROX) 6.5 % otic solution 5 drop, 5 drop, Both Ears, BID, Hetul Slater DO, 5 drop at 07/13/23 0855  •  clopidogrel (PLAVIX) tablet 75 mg, 75 mg, Oral, Daily, Shavonne Smith MD, 75 mg at 07/14/23 0910  •  ezetimibe (ZETIA) tablet 10 mg, 10 mg, Oral, HS, Madyson Victoria MD, 10 mg at 07/13/23 2110  •  fluticasone (FLONASE) 50 mcg/act nasal spray 2 spray, 2 spray, Nasal, Daily PRN, Madyson Victoria MD  •  fluticasone-vilanterol 200-25 mcg/actuation 1 puff, 1 puff, Inhalation, Daily, Madyson Victoria MD, 1 puff at 07/14/23 0910  •  guaiFENesin (MUCINEX) 12 hr tablet 600 mg, 600 mg, Oral, Q12H [facility], Nikki Corona MD, 600 mg at 07/14/23 0910  •  heparin (porcine) subcutaneous injection 5,000 Units, 5,000 Units, Subcutaneous, Q8H [address], Francois Santiago MD, 5,000 Units at 07/14/23 0558  •  hydrocortisone 1 % cream, , Topical, BID, Francois Santiago MD, Given at 07/14/23 0914  •  hydrOXYzine HCL (ATARAX) tablet 50 mg, 50 mg, Oral, TID PRN, Francois Santiago MD  •  isosorbide mononitrate (IMDUR) 24 hr tablet 30 mg, 30 mg, Oral, Daily, Francois Santiago MD, 30 mg at 07/14/23 0910  •  lubiprostone (AMITIZA) capsule 8 mcg, 8 mcg, Oral, BID, Francois Santiago MD, 8 mcg at 07/14/23 0909  •  metoprolol succinate (TOPROL-XL) 24 hr tablet 50 mg, 50 mg, Oral, Daily, Francois Santiago MD, 50 mg at 07/14/23 0910  •  nitroglycerin (NITROSTAT) SL tablet 0.4 mg, 0.4 mg, Sublingual, Q5 Min PRN, Francois Santiago MD  •  ondansetron [facility]) injection 4 mg, 4 mg, Intravenous, Q6H PRN, Francois Santiago MD  •  oxyCODONE (ROXICODONE) IR tablet 5 mg, 5 mg, Oral, TID, Nba Mcmullen MD, 5 mg at 07/14/23 0558  •  pantoprazole (PROTONIX) EC tablet 40 mg, 40 mg, Oral, BID AC, Francois Santiago MD, 40 mg at 07/14/23 0603  •  polyethylene glycol (MIRALAX) packet 17 g, 17 g, Oral, Daily, Hetul Slater, DO, 17 g at 07/13/23 4461  •  pramipexole (MIRAPEX) tablet 0.25 mg, 0.25 mg, Oral, After Maryann Parry MD, 0.25 mg at 07/13/23 1806  •  simethicone (MYLICON) chewable tablet 80 mg, 80 mg, Oral, 4x Daily PRN, Francois Santiago MD  •  temazepam (RESTORIL) capsule 15 mg, 15 mg, Oral, HS PRN, Tor Flores, PA-C, 15 mg at 07/13/23 2119     Objective:     Vitals:   Blood pressure 146/71, pulse 58, temperature 98.3 °F (36.8 °C), resp. rate 18, height 6' (1.829 m), weight 84.4 kg (186 lb 1.1 oz), SpO2 97 %. Body mass index is 25.24 kg/m².   Orthostatic Blood Pressures    Flowsheet Row Most Recent Value   Blood Pressure 146/71 filed at 07/14/2023 0708   Patient Position - Orthostatic VS Lying filed at 07/13/2023 2451         Systolic (93WYD), IJF:128 , Min:137 , ZPM:116     Diastolic (54OSN), Av, Min:66, Max:72      Intake/Output Summary (Last 24 hours) at 2023 1011  Last data filed at 2023 0708  Gross per 24 hour   Intake 440 ml   Output --   Net 440 ml     Weight (last 2 days)     Date/Time Weight    23 1300 84.4 (186.07)            Physical Exam  Vitals reviewed. Constitutional:       General: He is not in acute distress. HENT:      Head: Normocephalic. Ears:      Comments: Profound hearing loss  Cardiovascular:      Rate and Rhythm: Normal rate and regular rhythm. Heart sounds: S1 normal and S2 normal. No murmur heard. Pulmonary:      Breath sounds: No wheezing or rhonchi. Musculoskeletal:      Right lower leg: No edema. Left lower leg: No edema. Skin:     General: Skin is warm. Neurological:      Mental Status: He is alert. Mental status is at baseline. Psychiatric:         Mood and Affect: Mood normal.           Labs & Results:    Lab Results   Component Value Date    SODIUM 141 2023    K 3.5 2023     (H) 2023    CO2 27 2023    BUN 12 2023    CREATININE 1.20 2023    GLUC 80 2023    CALCIUM 9.1 2023     Lab Results   Component Value Date    WBC 7.59 07/10/2023    RBC 3.62 (L) 07/10/2023    HGB 10.6 (L) 07/10/2023    HCT 32.4 (L) 07/10/2023    MCV 90 07/10/2023    MCH 29.3 07/10/2023    RDW 12.7 07/10/2023     07/10/2023         Lab Results   Component Value Date    LDLCALC 40 2023     Lab Results   Component Value Date    FRZ4AHLQTDKT 2.985 2021           Available cardiac and imaging studies were reviewed independently. Available cardiology studies, imaging and lab results independently reviewed today. This note was completed in part utilizing voice recognition software.    Grammatical errors, random word insertion, spelling mistakes, occasional wrong word or "sound-alike" substitutions and incomplete sentences may be an occasional consequence of the system secondary to software limitations, ambient noise and hardware issues. At the time of dictation, efforts were made to edit, clarify and /or correct errors. Please read the chart carefully and recognize, using context, where substitutions have occurred. If you have any questions or concerns about the context, text or information contained within the body of this dictation, please contact myself, the provider, for further clarification.

## 2023-07-14 NOTE — PROGRESS NOTES
INTERNAL MEDICINE RESIDENCY PROGRESS NOTE     Name: Rosa Maria Diamond   Age & Sex: 69 y.o. male   MRN: [id number]  Unit/Bed#: [facility] 419-01   Encounter: [id number]  Team: SLIM    PATIENT INFORMATION     Name: Rosa Maria Diamond   Age & Sex: 69 y.o. male   MRN: [address] Stay Days: 7    ASSESSMENT/PLAN     Principal Problem:    Coronary artery disease  Active Problems:    Gastroesophageal reflux disease without esophagitis    HLD (hyperlipidemia)    Restless legs    Mild persistent asthma without complication    Uncomplicated opioid dependence ([address])    [place] (hard of hearing)    Stage 3a chronic kidney disease ([address])    Primary hypertension    Irritable bowel syndrome with constipation      * Coronary artery disease  Assessment & Plan  SLA 7/6 with 1 month progressive exertional chest discomfort  Dec 2021- inf MI s/p MERRITT to dRCA residual mLAD  Cath Jan 2022 and June 2022  LVH June 2023- nonischemic, 7/7/23 cath- Geraldine Shells LAD 90% stenosed   SLB for cardiac surgery CABG eval    VAS carotid <50% stenosis  CT chest- normal caliber aorta, no significant atherosclerotic, no pericardial effusion/adhesion, mild edema  Echo- 50% EF    Rate 58 bpm, however noted to be 40s on Tele      Plan:  ASA 81, atorva 80 mg , toprol 50 mg daily  Monitor tele  Resume Losartan after PCI  Plavix load given  Plan for High risk PCI today  CABG cancelled due to social situation        Irritable bowel syndrome with constipation  Assessment & Plan  IBS-C on Linzess  BM every 4 days   Denies abd pain or diarrhea or constipation at this time      Primary hypertension  Assessment & Plan  -149  Losartan 50 mg HELD given Cr elevation and pending CABG    Continue Toprol 50 mg daily      Stage 3a chronic kidney disease [facility])  Assessment & Plan  Lab Results   Component Value Date    EGFR 61 07/14/2023    EGFR 57 07/13/2023    EGFR 54 07/12/2023    CREATININE 1.20 07/14/2023    CREATININE 1.27 07/13/2023    CREATININE 1.33 (H) 07/12/2023 Baseline 1.1-1.3  Losartan held  Avoid nephrotoxins and hypotension  Encouraged oral hydration     [place] (hard of hearing)  Assessment & Plan  Noted    Uncomplicated opioid dependence ([address])  Assessment & Plan  Verified on PDMP  Continue oxy IR 5 mg TID      Mild persistent asthma without complication  Assessment & Plan  Continue breo with PRN albuterol    Restless legs  Assessment & Plan  Continue Pramipexole 0.25 mg daily    HLD (hyperlipidemia)  Assessment & Plan  On Atorvastatin 80 mg daily  Zetia 10 mg daily    Gastroesophageal reflux disease without esophagitis  Assessment & Plan  Continue Protonix 40 mg BID      Fever-resolved as of 7/13/2023  Assessment & Plan  • Intermittent fever  • A few days ago patient had a right ear pressure, right-sided sinus pain/pressure, later developed postnasal drip and productive cough  • Suspect sinus infection, viral upper respiratory tract  infection  • Pro-Ramez -negative x2  • Chest x-ray 7/6 negative  • COVID/flu/RSV negative  • Blood cultures negative at 24 hours  • CT chest showed mild edema, minimal dependent atelectasis in the lower lobes, no consolidation  • Afrin significantly improved his symptoms, relieving sinus pressure  • Continue doxycycline for possible sinus infection, he has penicillin allergy and not sure if he ever had cephalosporins      Currently afebrile for 24 hrs without leukocytosis    Day 5 out of 5 doxycycline, complete  • Mucinex, Tessalon Perles  • Incentive spirometry  • Continue Afrin      Disposition: Pending PCI      SUBJECTIVE     Patient seen and examined. No acute events overnight. Resting comfortably and ambulatory. Denies chest pain, SOB, dizziness, headache, palpitations.      OBJECTIVE     Vitals:    07/13/23 2246 07/14/23 0322 07/14/23 0708 07/14/23 0800   BP: 140/69 137/66 146/71    BP Location:       Pulse: 56 57 58    Resp: 13 13 18    Temp: 98.2 °F (36.8 °C) 98 °F (36.7 °C) 98.3 °F (36.8 °C)    TempSrc:       SpO2: 96% 97% 97% 97% Weight:       Height:          Temperature:   Temp (24hrs), Av.3 °F (36.8 °C), Min:98 °F (36.7 °C), Max:98.7 °F (37.1 °C)    Temperature: 98.3 °F (36.8 °C)  Intake & Output:  I/O        07 0700  07 07 0701  07/15 0700    P. O. 520 660 0    Total Intake(mL/kg) 520 (6.2) 660 (7.8) 0 (0)    Urine (mL/kg/hr)       Total Output       Net +520 +660 0           Unmeasured Stool Occurrence  1 x         Weights:   IBW (Ideal Body Weight): 77.6 kg    Body mass index is 25.24 kg/m². Weight (last 2 days)     Date/Time Weight    23 1300 84.4 (186.07)        Physical Exam  Vitals reviewed. Constitutional:       General: He is not in acute distress. HENT:      Head: Normocephalic and atraumatic. Mouth/Throat:      Pharynx: Oropharynx is clear. Eyes:      Extraocular Movements: Extraocular movements intact. Conjunctiva/sclera: Conjunctivae normal.   Cardiovascular:      Rate and Rhythm: Normal rate and regular rhythm. Pulses: Normal pulses. Heart sounds: Normal heart sounds. Pulmonary:      Effort: Pulmonary effort is normal.      Breath sounds: Normal breath sounds. Abdominal:      Palpations: Abdomen is soft. Tenderness: There is no abdominal tenderness. Musculoskeletal:         General: Normal range of motion. Right lower leg: No edema. Left lower leg: No edema. Neurological:      Mental Status: He is alert and oriented to person, place, and time. LABORATORY DATA     Labs: I have personally reviewed pertinent reports.   Results from last 7 days   Lab Units 07/10/23  0519 23  0507 23  0536   WBC Thousand/uL 7.59 9.57 10.11   HEMOGLOBIN g/dL 10.6* 11.0* 11.3*   HEMATOCRIT % 32.4* 34.5* 35.2*   PLATELETS Thousands/uL 232 207 209   NEUTROS PCT %  --   --  66   MONOS PCT %  --   --  10   EOS PCT %  --   --  2      Results from last 7 days   Lab Units 23  0437 23  0445 23  0514   POTASSIUM mmol/L 3.5 3.8 3. 9   CHLORIDE mmol/L 109* 110* 111*   CO2 mmol/L 27 27 27   BUN mg/dL 12 15 17   CREATININE mg/dL 1.20 1.27 1.33*   CALCIUM mg/dL 9.1 8.8 8.9     Results from last 7 days   Lab Units 07/08/23  0536   MAGNESIUM mg/dL 1.9                        IMAGING & DIAGNOSTIC TESTING     Radiology Results: I have personally reviewed pertinent reports. CT chest wo contrast    Result Date: 7/8/2023  Impression: Normal caliber ascending aorta with no significant atherosclerotic calcification. No pericardial effusion or adhesion. Mild edema. Workstation performed: [id number]     Other Diagnostic Testing: I have personally reviewed pertinent reports.     ACTIVE MEDICATIONS     Current Facility-Administered Medications   Medication Dose Route Frequency   • acetaminophen (TYLENOL) tablet 975 mg  975 mg Oral Q6H PRN   • albuterol (PROVENTIL HFA,VENTOLIN HFA) inhaler 2 puff  2 puff Inhalation Q4H PRN   • aluminum-magnesium hydroxide-simethicone (MAALOX) oral suspension 30 mL  30 mL Oral Q6H PRN   • aspirin chewable tablet 81 mg  81 mg Oral Daily   • atorvastatin (LIPITOR) tablet 80 mg  80 mg Oral HS   • benzonatate (TESSALON PERLES) capsule 100 mg  100 mg Oral TID PRN   • bisacodyl (DULCOLAX) rectal suppository 10 mg  10 mg Rectal Daily   • carbamide peroxide (DEBROX) 6.5 % otic solution 5 drop  5 drop Both Ears BID   • clopidogrel (PLAVIX) tablet 75 mg  75 mg Oral Daily   • ezetimibe (ZETIA) tablet 10 mg  10 mg Oral HS   • fluticasone (FLONASE) 50 mcg/act nasal spray 2 spray  2 spray Nasal Daily PRN   • fluticasone-vilanterol 200-25 mcg/actuation 1 puff  1 puff Inhalation Daily   • guaiFENesin (MUCINEX) 12 hr tablet 600 mg  600 mg Oral Q12H [facility]I   • heparin (porcine) subcutaneous injection 5,000 Units  5,000 Units Subcutaneous Q8H [facility]   • hydrocortisone 1 % cream   Topical BID   • hydrOXYzine HCL (ATARAX) tablet 50 mg  50 mg Oral TID PRN   • isosorbide mononitrate (IMDUR) 24 hr tablet 30 mg  30 mg Oral Daily   • lubiprostone (AMITIZA) capsule 8 mcg  8 mcg Oral BID   • metoprolol succinate (TOPROL-XL) 24 hr tablet 50 mg  50 mg Oral Daily   • nitroglycerin (NITROSTAT) SL tablet 0.4 mg  0.4 mg Sublingual Q5 Min PRN   • ondansetron (ZOFRAN) injection 4 mg  4 mg Intravenous Q6H PRN   • oxyCODONE (ROXICODONE) IR tablet 5 mg  5 mg Oral TID   • pantoprazole (PROTONIX) EC tablet 40 mg  40 mg Oral BID AC   • polyethylene glycol (MIRALAX) packet 17 g  17 g Oral Daily   • pramipexole (MIRAPEX) tablet 0.25 mg  0.25 mg Oral After Dinner   • simethicone (MYLICON) chewable tablet 80 mg  80 mg Oral 4x Daily PRN   • temazepam (RESTORIL) capsule 15 mg  15 mg Oral HS PRN       VTE Pharmacologic Prophylaxis: Heparin  VTE Mechanical Prophylaxis: sequential compression device    Portions of the record may have been created with voice recognition software. Occasional wrong word or "sound a like" substitutions may have occurred due to the inherent limitations of voice recognition software.   Read the chart carefully and recognize, using context, where substitutions have occurred.  ==  Patricia Bailey MD  [address] [facility]  Internal Medicine Residency PGY-2

## 2023-07-15 LAB
ANION GAP SERPL CALCULATED.3IONS-SCNC: 5 MMOL/L
BUN SERPL-MCNC: 14 MG/DL (ref 5–25)
CALCIUM SERPL-MCNC: 8.9 MG/DL (ref 8.3–10.1)
CHLORIDE SERPL-SCNC: 109 MMOL/L (ref 96–108)
CO2 SERPL-SCNC: 25 MMOL/L (ref 21–32)
CREAT SERPL-MCNC: 1.16 MG/DL (ref 0.6–1.3)
GFR SERPL CREATININE-BSD FRML MDRD: 63 ML/MIN/1.73SQ M
GLUCOSE SERPL-MCNC: 76 MG/DL (ref 65–140)
POTASSIUM SERPL-SCNC: 3.8 MMOL/L (ref 3.5–5.3)
SODIUM SERPL-SCNC: 139 MMOL/L (ref 135–147)

## 2023-07-15 PROCEDURE — 80048 BASIC METABOLIC PNL TOTAL CA: CPT | Performed by: INTERNAL MEDICINE

## 2023-07-15 PROCEDURE — 99233 SBSQ HOSP IP/OBS HIGH 50: CPT | Performed by: INTERNAL MEDICINE

## 2023-07-15 PROCEDURE — 99232 SBSQ HOSP IP/OBS MODERATE 35: CPT | Performed by: INTERNAL MEDICINE

## 2023-07-15 RX ORDER — ISOSORBIDE MONONITRATE 30 MG/1
30 TABLET, EXTENDED RELEASE ORAL DAILY
Status: DISCONTINUED | OUTPATIENT
Start: 2023-07-15 | End: 2023-07-17 | Stop reason: HOSPADM

## 2023-07-15 RX ADMIN — HEPARIN SODIUM 5000 UNITS: 5000 INJECTION INTRAVENOUS; SUBCUTANEOUS at 14:45

## 2023-07-15 RX ADMIN — EZETIMIBE 10 MG: 10 TABLET ORAL at 22:16

## 2023-07-15 RX ADMIN — Medication 10 MG: at 08:33

## 2023-07-15 RX ADMIN — OXYCODONE HYDROCHLORIDE 5 MG: 5 TABLET ORAL at 12:01

## 2023-07-15 RX ADMIN — HEPARIN SODIUM 5000 UNITS: 5000 INJECTION INTRAVENOUS; SUBCUTANEOUS at 22:16

## 2023-07-15 RX ADMIN — CARBAMIDE PEROXIDE 6.5% 5 DROP: 6.5 LIQUID AURICULAR (OTIC) at 08:33

## 2023-07-15 RX ADMIN — FLUTICASONE FUROATE AND VILANTEROL TRIFENATATE 1 PUFF: 200; 25 POWDER RESPIRATORY (INHALATION) at 08:33

## 2023-07-15 RX ADMIN — ASPIRIN 81 MG CHEWABLE TABLET 81 MG: 81 TABLET CHEWABLE at 08:34

## 2023-07-15 RX ADMIN — PRAMIPEXOLE DIHYDROCHLORIDE 0.25 MG: 0.25 TABLET ORAL at 18:06

## 2023-07-15 RX ADMIN — OXYCODONE HYDROCHLORIDE 5 MG: 5 TABLET ORAL at 18:06

## 2023-07-15 RX ADMIN — PANTOPRAZOLE SODIUM 40 MG: 40 TABLET, DELAYED RELEASE ORAL at 06:00

## 2023-07-15 RX ADMIN — LUBIPROSTONE 8 MCG: 8 CAPSULE, GELATIN COATED ORAL at 08:34

## 2023-07-15 RX ADMIN — TEMAZEPAM 15 MG: 15 CAPSULE ORAL at 22:16

## 2023-07-15 RX ADMIN — HYDROCORTISONE: 1 CREAM TOPICAL at 08:38

## 2023-07-15 RX ADMIN — PANTOPRAZOLE SODIUM 40 MG: 40 TABLET, DELAYED RELEASE ORAL at 18:06

## 2023-07-15 RX ADMIN — ISOSORBIDE MONONITRATE 30 MG: 30 TABLET, EXTENDED RELEASE ORAL at 12:01

## 2023-07-15 RX ADMIN — HYDROCORTISONE: 1 CREAM TOPICAL at 18:07

## 2023-07-15 RX ADMIN — HEPARIN SODIUM 5000 UNITS: 5000 INJECTION INTRAVENOUS; SUBCUTANEOUS at 05:48

## 2023-07-15 RX ADMIN — CLOPIDOGREL BISULFATE 75 MG: 75 TABLET ORAL at 08:34

## 2023-07-15 RX ADMIN — CARBAMIDE PEROXIDE 6.5% 5 DROP: 6.5 LIQUID AURICULAR (OTIC) at 18:06

## 2023-07-15 RX ADMIN — GUAIFENESIN 600 MG: 600 TABLET, EXTENDED RELEASE ORAL at 22:16

## 2023-07-15 RX ADMIN — METOPROLOL SUCCINATE 50 MG: 50 TABLET, EXTENDED RELEASE ORAL at 08:34

## 2023-07-15 RX ADMIN — OXYCODONE HYDROCHLORIDE 5 MG: 5 TABLET ORAL at 05:48

## 2023-07-15 RX ADMIN — LUBIPROSTONE 8 MCG: 8 CAPSULE, GELATIN COATED ORAL at 22:16

## 2023-07-15 RX ADMIN — GUAIFENESIN 600 MG: 600 TABLET, EXTENDED RELEASE ORAL at 08:34

## 2023-07-15 RX ADMIN — POLYETHYLENE GLYCOL 3350 17 G: 17 POWDER, FOR SOLUTION ORAL at 08:34

## 2023-07-15 RX ADMIN — ATORVASTATIN CALCIUM 80 MG: 80 TABLET, FILM COATED ORAL at 22:16

## 2023-07-15 NOTE — ASSESSMENT & PLAN NOTE
· Presented to [facility] on 7/6/2023 with a 4-week history of progressively worsening chest discomfort with exertion  · H/o inferior MI in December 2021 - s/p MERRITT to dRCA with residual mLAD disease; had angina and underwent cath on 1/3/2022 with MERRITT to mLAD; stable angina and underwent cath in June 2022 s/p MERRITT to LCX and POBA to OM1 with 20% residual, patent MERRITT-dRCA and MERRITT-mLAD  · He was seen at [facility] from 6/27/2023 to 6/28/2023. EKG was nonischemic and troponins were negative x 3. Lexiscan stress test was reported as negative for ischemia. Echocardiogram was unremarkable. He was seen by cardiology and begun on Imdur 30 mg daily and advised to continue ASA 81 mg daily, Plavix 75 mg daily, atorvastatin 80 mg daily, Toprol-XL 50 mg daily  · He was advised to come to the ED after he saw his primary care physician on 7/6/2023 as he only had mild improvement in his symptoms  · Cardiac cath (7/7/23) - Ost LAD lesion is 90% stenosed. Prior mid LAD, prox LCX, OM, and distal RCA stents are patent. · [facility] [place] [facility] cardiac [facility] and cardiology evaluation   · Ct ASA 81 mg daily, Atorvastatin 80 mg daily, Toprol XL 50 mg daily  · Status post high risk PCI July 14. · Note cardiology input. Discharge planning. Will need to clarify patient's living arrangements.

## 2023-07-15 NOTE — ASSESSMENT & PLAN NOTE
· History of IBS C, on Linzess  · Usually has a bowel movement every 4 days, on the day he has a bowel movements he has severe cramping and he usually spends the day in bed.  Had BMs 7/8  · Will bring in [address] [place] [address] from home

## 2023-07-15 NOTE — PROGRESS NOTES
Cardiology Progress Note - Caridad Castro 69 y.o. male MRN: [id number]    Unit/Bed#: [facility] 419-01 Encounter: [id number]        Hospital Problems:  Principal Problem:    Coronary artery disease  Active Problems:    Gastroesophageal reflux disease without esophagitis    HLD (hyperlipidemia)    Restless legs    Mild persistent asthma without complication    Uncomplicated opioid dependence (HCC)    [place] (hard of hearing)    Stage 3a chronic kidney disease (HCC)    Primary hypertension    Irritable bowel syndrome with constipation      Assessment/Recommendations:    Severe ostial LAD disease, patent mid LAD, circumflex and RCA stents  Underwent complex PCI of the ostial LAD. Left circumflex was jailed and rescue PTA performed of the ostial circumflex. Final kissing balloon angioplasty performed with excellent angiographic results and TARAS-3 flow in both vessels. No intraprocedural complications. Good left main and LAD stent expansion noted. Continue dual antiplatelet therapy for at least 1 year. High intensity statins. Continue Imdur. Continue Toprol-XL. Outpatient cardiology follow-up with Dr. Kumar Avilez in the [facility] office to be scheduled. Message sent to the [facility] cardiology office.       Dyslipidemia  Continue high intensity statins and Zetia. CKD stage III  Creatinine improved to 1.1. Continue dual antiplatelet therapy for at least 1 year. Continue high intensity statins. Patient requested that the Imdur be continued. Stable for discharge from a cardiac standpoint. Thank you for allowing us to participate in the care of this patient. If there are any further questions regarding this patient please feel free to contact us. Subjective:     Overnight events reviewed. Underwent complex high risk PCI of the ostial LAD yesterday. No angina. He is ambulatory with no further exertional dyspnea or fatigue. He feels a whole lot better. Today he is bothered by right ear pain.   He has profound hearing loss. Review of Systems   Constitutional: Positive for fatigue. Negative for fever. HENT: Positive for ear pain and hearing loss. Respiratory: Negative for chest tightness, shortness of breath and wheezing. Cardiovascular: Negative for chest pain, palpitations and leg swelling. Skin: Negative for rash. Neurological: Negative for syncope. Hematological: Does not bruise/bleed easily. All other systems reviewed and are negative. Review of ten system was conducted and was negative except for findings stated elsewhere in the note.         Current Facility-Administered Medications:   •  acetaminophen (TYLENOL) tablet 975 mg, 975 mg, Oral, Q6H PRN, Kaylah Wheatley MD, 975 mg at 07/12/23 2100  •  albuterol (PROVENTIL HFA,VENTOLIN HFA) inhaler 2 puff, 2 puff, Inhalation, Q4H PRN, Kaylah Wheatley MD  •  aluminum-magnesium hydroxide-simethicone (MAALOX) oral suspension 30 mL, 30 mL, Oral, Q6H PRN, Kaylah Wheatley MD  •  aspirin chewable tablet 81 mg, 81 mg, Oral, Daily, Kaylah Wheatley MD, 81 mg at 07/14/23 0910  •  atorvastatin (LIPITOR) tablet 80 mg, 80 mg, Oral, HS, Kaylah Wheatley MD, 80 mg at 07/14/23 2136  •  benzonatate (TESSALON PERLES) capsule 100 mg, 100 mg, Oral, TID PRN, Kaylah Wheatley MD, 100 mg at 07/11/23 1714  •  bisacodyl (DULCOLAX) rectal suppository 10 mg, 10 mg, Rectal, Daily, Kaylah Wheatley MD, 10 mg at 07/13/23 0853  •  carbamide peroxide (DEBROX) 6.5 % otic solution 5 drop, 5 drop, Both Ears, BID, Kaylah Wheatley MD, 5 drop at 07/13/23 0855  •  clopidogrel (PLAVIX) tablet 75 mg, 75 mg, Oral, Daily, Kaylah Wheatley MD, 75 mg at 07/14/23 0910  •  ezetimibe (ZETIA) tablet 10 mg, 10 mg, Oral, HS, Kaylah Wheatley MD, 10 mg at 07/14/23 2137  •  fluticasone (FLONASE) 50 mcg/act nasal spray 2 spray, 2 spray, Nasal, Daily PRN, Kaylah Wheately MD  •  fluticasone-vilanterol 200-25 mcg/actuation 1 puff, 1 puff, Inhalation, Daily, Kaylah Wheatley MD, 1 puff at 07/14/23 0910  • guaiFENesin (MUCINEX) 12 hr tablet 600 mg, 600 mg, Oral, Q12H [facility], Ian Leggett MD, 600 mg at 07/14/23 2137  •  heparin (porcine) subcutaneous injection 5,000 Units, 5,000 Units, Subcutaneous, Q8H [facility], Ian Leggett MD, 5,000 Units at 07/15/23 0548  •  hydrocortisone 1 % cream, , Topical, BID, Ian Leggett MD, Given at 07/14/23 0914  •  hydrOXYzine HCL (ATARAX) tablet 50 mg, 50 mg, Oral, TID PRN, Ian Leggett MD  •  isosorbide mononitrate (IMDUR) 24 hr tablet 30 mg, 30 mg, Oral, Daily, Ian Leggett MD, 30 mg at 07/14/23 0910  •  lubiprostone (AMITIZA) capsule 8 mcg, 8 mcg, Oral, BID, Ian Leggett MD, 8 mcg at 07/14/23 2137  •  metoprolol succinate (TOPROL-XL) 24 hr tablet 50 mg, 50 mg, Oral, Daily, Ian Leggett MD, 50 mg at 07/14/23 0910  •  nitroglycerin (NITROSTAT) SL tablet 0.4 mg, 0.4 mg, Sublingual, Q5 Min PRN, Ian Leggett MD  •  ondansetron (ZOFRAN) injection 4 mg, 4 mg, Intravenous, Q6H PRN, Ian Leggett MD  •  oxyCODONE (ROXICODONE) IR tablet 5 mg, 5 mg, Oral, TID, Ian Leggett MD, 5 mg at 07/15/23 0548  •  pantoprazole (PROTONIX) EC tablet 40 mg, 40 mg, Oral, BID AC, Ian Leggett MD, 40 mg at 07/15/23 0600  •  polyethylene glycol (MIRALAX) packet 17 g, 17 g, Oral, Daily, Ian Leggett MD, 17 g at 07/13/23 2050  •  pramipexole (MIRAPEX) tablet 0.25 mg, 0.25 mg, Oral, After Nuvia Villafana MD, 0.25 mg at 07/14/23 1700  •  simethicone (MYLICON) chewable tablet 80 mg, 80 mg, Oral, 4x Daily PRN, Ian Leggett MD  •  temazepam (RESTORIL) capsule 15 mg, 15 mg, Oral, HS PRN, Ian Leggett MD, 15 mg at 07/13/23 9209     Objective:     Vitals:   Blood pressure 157/78, pulse 61, temperature 98 °F (36.7 °C), resp. rate 17, height 6' (1.829 m), weight 84.4 kg (186 lb 1.1 oz), SpO2 98 %. Body mass index is 25.24 kg/m².   Orthostatic Blood Pressures    Flowsheet Row Most Recent Value   Blood Pressure 157/78 filed at 07/15/2023 4420   Patient Position - Orthostatic VS Lying filed at 07/13/2023 4296         Systolic (55IHY), JWJ:019 , Min:100 , YJF:234     Diastolic (70SAG), ZRF:89, Min:49, Max:79      Intake/Output Summary (Last 24 hours) at 7/15/2023 1451  Last data filed at 7/15/2023 0401  Gross per 24 hour   Intake 0 ml   Output 520 ml   Net -520 ml     Weight (last 2 days)     None            Physical Exam  Vitals reviewed. Constitutional:       General: He is not in acute distress. HENT:      Head: Normocephalic. Cardiovascular:      Rate and Rhythm: Normal rate and regular rhythm. Heart sounds: S1 normal and S2 normal. No murmur heard. Comments: Cardiac cath access site stable  Pulmonary:      Breath sounds: No wheezing or rhonchi. Musculoskeletal:      Right lower leg: No edema. Left lower leg: No edema. Skin:     General: Skin is warm. Neurological:      Mental Status: He is alert. Mental status is at baseline. Psychiatric:         Mood and Affect: Mood normal.           Labs & Results:    Lab Results   Component Value Date    SODIUM 139 07/15/2023    K 3.8 07/15/2023     (H) 07/15/2023    CO2 25 07/15/2023    BUN 14 07/15/2023    CREATININE 1.16 07/15/2023    GLUC 76 07/15/2023    CALCIUM 8.9 07/15/2023     Lab Results   Component Value Date    WBC 7.59 07/10/2023    RBC 3.62 (L) 07/10/2023    HGB 10.6 (L) 07/10/2023    HCT 32.4 (L) 07/10/2023    MCV 90 07/10/2023    MCH 29.3 07/10/2023    RDW 12.7 07/10/2023     07/10/2023         Lab Results   Component Value Date    LDLCALC 40 07/09/2023     Lab Results   Component Value Date    COR7HDGBUDKA 2.985 05/13/2021           Available cardiac and imaging studies were reviewed independently. Available cardiology studies, imaging and lab results independently reviewed today. This note was completed in part utilizing voice recognition software.    Grammatical errors, random word insertion, spelling mistakes, occasional wrong word or "sound-alike" substitutions and incomplete sentences may be an occasional consequence of the system secondary to software limitations, ambient noise and hardware issues. At the time of dictation, efforts were made to edit, clarify and /or correct errors. Please read the chart carefully and recognize, using context, where substitutions have occurred. If you have any questions or concerns about the context, text or information contained within the body of this dictation, please contact myself, the provider, for further clarification.

## 2023-07-15 NOTE — PROGRESS NOTES
[facility]  Progress Note  Name: Elijah Espinoza  MRN: [id number]  Unit/Bed#: [facility] 584-29 I Date of Admission: 7/7/2023   Date of Service: 7/15/2023 I Hospital Day: 8    Assessment/Plan   * Coronary artery disease  Assessment & Plan  · Presented to [address] [place] [address] on 7/6/2023 with a 4-week history of progressively worsening chest discomfort with exertion  · H/o inferior MI in December 2021 - s/p MERIRTT to [facility] with residual mLAD disease; had angina and underwent cath on 1/3/2022 with MERRITT to mLAD; stable angina and underwent cath in June 2022 s/p MERRITT to LCX and POBA to OM1 with 20% residual, patent MERRITT-dRCA and MERRITT-mLAD  · He was seen at [address] [facility] from 6/27/2023 to 6/28/2023. EKG was nonischemic and troponins were negative x 3. Lexiscan stress test was reported as negative for ischemia. Echocardiogram was unremarkable. He was seen by cardiology and begun on Imdur 30 mg daily and advised to continue ASA 81 mg daily, Plavix 75 mg daily, atorvastatin 80 mg daily, Toprol-XL 50 mg daily  · He was advised to come to the ED after he saw his primary care physician on 7/6/2023 as he only had mild improvement in his symptoms  · Cardiac cath (7/7/23) - Ost LAD lesion is 90% stenosed. Prior mid LAD, prox LCX, OM, and distal RCA stents are patent. · Transferred to [facility] for cardiac surgery and cardiology evaluation   · Ct ASA 81 mg daily, Atorvastatin 80 mg daily, Toprol XL 50 mg daily  · Status post high risk PCI July 14. · Note cardiology input. Discharge planning. Will need to clarify patient's living arrangements. Irritable bowel syndrome with constipation  Assessment & Plan  · History of IBS C, on Linzess  · Usually has a bowel movement every 4 days, on the day he has a bowel movements he has severe cramping and he usually spends the day in bed.  Had BMs 7/8  · Will bring in [address] [place] [address] from home    Primary hypertension  Assessment & Plan  · Home medications: Toprol-XL 50 mg daily, losartan 50 mg daily  · Losartan held in the setting of increase in creatinine from baseline and possible CABG  · Monitor BP    Stage 3a chronic kidney disease [facility])  Assessment & Plan  Lab Results   Component Value Date    EGFR 63 07/15/2023    EGFR 61 2023    EGFR 57 2023    CREATININE 1.16 07/15/2023    CREATININE 1.20 2023    CREATININE 1.27 2023   · Baseline creatinine 1.1 to 1.3  · Creatinine elevated at 1.62 on 2023 and improved now  · Losartan held  · Monitor creatinine  · Avoid nephrotoxic medications and hypotension    Uncomplicated opioid dependence (HCC)  Assessment & Plan  · Continue oxycodone IR 5 mg 3 times daily, at 6 AM, 12 PM and 6 PM  · Verified on PDMP    Restless legs  Assessment & Plan  · Continue Pramipexole 0.25 mg daily    HLD (hyperlipidemia)  Assessment & Plan  · Continue Atorvastatin 80 mg daily, Zetia 10 mg daily    Gastroesophageal reflux disease without esophagitis  Assessment & Plan  · Continue PPI twice daily           VTE Pharmacologic Prophylaxis:   Pharmacologic: Heparin  Mechanical VTE Prophylaxis in Place: No    Patient Centered Rounds: I have performed bedside rounds with nursing staff today. Time Spent for Care: 54. More than 50% of total time spent on counseling and coordination of care as described above. Current Length of Stay: 8 day(s)    Current Patient Status: Inpatient       Code Status: Level 1 - Full Code      Subjective:   nad    Objective:     Vitals:   Temp (24hrs), Av.1 °F (36.7 °C), Min:97.6 °F (36.4 °C), Max:98.8 °F (37.1 °C)    Temp:  [97.6 °F (36.4 °C)-98.8 °F (37.1 °C)] 98 °F (36.7 °C)  HR:  [50-61] 61  Resp:  [14-17] 17  BP: (100-157)/(49-79) 157/78  SpO2:  [94 %-98 %] 98 %  Body mass index is 25.24 kg/m². Input and Output Summary (last 24 hours):        Intake/Output Summary (Last 24 hours) at 7/15/2023 0905  Last data filed at 7/15/2023 0401  Gross per 24 hour   Intake 0 ml   Output 520 ml   Net -520 ml Physical Exam:     Physical Exam  Constitutional:       Appearance: Normal appearance. HENT:      Head: Normocephalic and atraumatic. Cardiovascular:      Rate and Rhythm: Normal rate and regular rhythm. Pulses: Normal pulses. Pulmonary:      Effort: Pulmonary effort is normal.      Breath sounds: Normal breath sounds. Skin:     General: Skin is warm and dry. Neurological:      General: No focal deficit present. Mental Status: He is alert.          Additional Data:     Labs:    Results from last 7 days   Lab Units 07/10/23  0519   WBC Thousand/uL 7.59   HEMOGLOBIN g/dL 10.6*   HEMATOCRIT % 32.4*   PLATELETS Thousands/uL 232     Results from last 7 days   Lab Units 07/15/23  0548   POTASSIUM mmol/L 3.8   CHLORIDE mmol/L 109*   CO2 mmol/L 25   BUN mg/dL 14   CREATININE mg/dL 1.16   CALCIUM mg/dL 8.9             Recent Cultures (last 7 days):           Last 24 Hours Medication List:   Current Facility-Administered Medications   Medication Dose Route Frequency Provider Last Rate   • acetaminophen  975 mg Oral Q6H PRN Kaylah Wheatley MD     • albuterol  2 puff Inhalation Q4H PRN Kaylah Wheatley MD     • aluminum-magnesium hydroxide-simethicone  30 mL Oral Q6H PRN Kaylah Wheatley MD     • aspirin  81 mg Oral Daily Kaylah Wheatley MD     • atorvastatin  80 mg Oral HS Kaylah Wheatley MD     • benzonatate  100 mg Oral TID PRN Kaylah Wheatley MD     • bisacodyl  10 mg Rectal Daily Kaylah Wheatley MD     • carbamide peroxide  5 drop Both Ears BID Kaylah Wheatley MD     • clopidogrel  75 mg Oral Daily Kaylah Wheatley MD     • ezetimibe  10 mg Oral HS Kaylah Wheatley MD     • fluticasone  2 spray Nasal Daily PRN Kaylah Wheatley MD     • fluticasone-vilanterol  1 puff Inhalation Daily Kaylah Wheatley MD     • guaiFENesin  600 mg Oral Q12H Francine Ortega MD     • heparin (porcine)  5,000 Units Subcutaneous Q8H [facility] Kaylah Wheatley MD     • hydrocortisone   Topical BID Kaylah Wheatley MD     • hydrOXYzine HCL  50 mg Oral TID PRN Trav Wagner MD     • lubiprostone  8 mcg Oral BID Trav Wagner MD     • metoprolol succinate  50 mg Oral Daily Trav Wagner MD     • nitroglycerin  0.4 mg Sublingual Q5 Min PRN Trav Wagner MD     • ondansetron  4 mg Intravenous Q6H PRN Trav Wagner MD     • oxyCODONE  5 mg Oral TID Trav Wagner MD     • pantoprazole  40 mg Oral BID AC Trav Wagner MD     • polyethylene glycol  17 g Oral Daily Trav Wagner MD     • pramipexole  0.25 mg Oral After Kaylah Sow MD     • simethicone  80 mg Oral 4x Daily PRN Trav Wagner MD     • temazepam  15 mg Oral HS PRN Trav Wagner MD          Today, Patient Was Seen By: Kev Bhakta DO    ** Please Note: Dictation voice to text software may have been used in the creation of this document.  **

## 2023-07-15 NOTE — ASSESSMENT & PLAN NOTE
Lab Results   Component Value Date    EGFR 63 07/15/2023    EGFR 61 07/14/2023    EGFR 57 07/13/2023    CREATININE 1.16 07/15/2023    CREATININE 1.20 07/14/2023    CREATININE 1.27 07/13/2023   · Baseline creatinine 1.1 to 1.3  · Creatinine elevated at 1.62 on 7/6/2023 and improved now  · Losartan held  · Monitor creatinine  · Avoid nephrotoxic medications and hypotension

## 2023-07-16 LAB
ANION GAP SERPL CALCULATED.3IONS-SCNC: 1 MMOL/L
BUN SERPL-MCNC: 15 MG/DL (ref 5–25)
CALCIUM SERPL-MCNC: 9 MG/DL (ref 8.3–10.1)
CHLORIDE SERPL-SCNC: 110 MMOL/L (ref 96–108)
CO2 SERPL-SCNC: 27 MMOL/L (ref 21–32)
CREAT SERPL-MCNC: 1.26 MG/DL (ref 0.6–1.3)
GFR SERPL CREATININE-BSD FRML MDRD: 57 ML/MIN/1.73SQ M
GLUCOSE SERPL-MCNC: 86 MG/DL (ref 65–140)
KCT BLD-ACNC: 207 SEC (ref 89–137)
KCT BLD-ACNC: 334 SEC (ref 89–137)
POTASSIUM SERPL-SCNC: 3.8 MMOL/L (ref 3.5–5.3)
SODIUM SERPL-SCNC: 138 MMOL/L (ref 135–147)
SPECIMEN SOURCE: ABNORMAL
SPECIMEN SOURCE: ABNORMAL

## 2023-07-16 PROCEDURE — 99233 SBSQ HOSP IP/OBS HIGH 50: CPT | Performed by: INTERNAL MEDICINE

## 2023-07-16 PROCEDURE — 80048 BASIC METABOLIC PNL TOTAL CA: CPT | Performed by: INTERNAL MEDICINE

## 2023-07-16 RX ADMIN — PANTOPRAZOLE SODIUM 40 MG: 40 TABLET, DELAYED RELEASE ORAL at 17:48

## 2023-07-16 RX ADMIN — ASPIRIN 81 MG CHEWABLE TABLET 81 MG: 81 TABLET CHEWABLE at 09:07

## 2023-07-16 RX ADMIN — CARBAMIDE PEROXIDE 6.5% 5 DROP: 6.5 LIQUID AURICULAR (OTIC) at 17:49

## 2023-07-16 RX ADMIN — METOPROLOL SUCCINATE 50 MG: 50 TABLET, EXTENDED RELEASE ORAL at 09:07

## 2023-07-16 RX ADMIN — LUBIPROSTONE 8 MCG: 8 CAPSULE, GELATIN COATED ORAL at 20:57

## 2023-07-16 RX ADMIN — EZETIMIBE 10 MG: 10 TABLET ORAL at 21:09

## 2023-07-16 RX ADMIN — PRAMIPEXOLE DIHYDROCHLORIDE 0.25 MG: 0.25 TABLET ORAL at 17:48

## 2023-07-16 RX ADMIN — FLUTICASONE PROPIONATE 2 SPRAY: 50 SPRAY, METERED NASAL at 20:47

## 2023-07-16 RX ADMIN — ISOSORBIDE MONONITRATE 30 MG: 30 TABLET, EXTENDED RELEASE ORAL at 09:07

## 2023-07-16 RX ADMIN — LUBIPROSTONE 8 MCG: 8 CAPSULE, GELATIN COATED ORAL at 09:07

## 2023-07-16 RX ADMIN — GUAIFENESIN 600 MG: 600 TABLET, EXTENDED RELEASE ORAL at 09:07

## 2023-07-16 RX ADMIN — TEMAZEPAM 15 MG: 15 CAPSULE ORAL at 20:57

## 2023-07-16 RX ADMIN — OXYCODONE HYDROCHLORIDE 5 MG: 5 TABLET ORAL at 05:27

## 2023-07-16 RX ADMIN — CARBAMIDE PEROXIDE 6.5% 5 DROP: 6.5 LIQUID AURICULAR (OTIC) at 09:10

## 2023-07-16 RX ADMIN — PANTOPRAZOLE SODIUM 40 MG: 40 TABLET, DELAYED RELEASE ORAL at 06:40

## 2023-07-16 RX ADMIN — OXYCODONE HYDROCHLORIDE 5 MG: 5 TABLET ORAL at 17:48

## 2023-07-16 RX ADMIN — GUAIFENESIN 600 MG: 600 TABLET, EXTENDED RELEASE ORAL at 20:57

## 2023-07-16 RX ADMIN — ATORVASTATIN CALCIUM 80 MG: 80 TABLET, FILM COATED ORAL at 21:10

## 2023-07-16 RX ADMIN — FLUTICASONE FUROATE AND VILANTEROL TRIFENATATE 1 PUFF: 200; 25 POWDER RESPIRATORY (INHALATION) at 09:07

## 2023-07-16 RX ADMIN — CLOPIDOGREL BISULFATE 75 MG: 75 TABLET ORAL at 09:07

## 2023-07-16 RX ADMIN — Medication 10 MG: at 09:10

## 2023-07-16 RX ADMIN — HYDROCORTISONE: 1 CREAM TOPICAL at 17:49

## 2023-07-16 RX ADMIN — OXYCODONE HYDROCHLORIDE 5 MG: 5 TABLET ORAL at 12:04

## 2023-07-16 NOTE — PROGRESS NOTES
[facility]  Progress Note  Name: Chiki Leong  MRN: [id number]  Unit/Bed#: [facility] 615-27 I Date of Admission: 7/7/2023   Date of Service: 7/16/2023 I Hospital Day: 9    Assessment/Plan   * Coronary artery disease  Assessment & Plan  · Presented to [address] [place] [address] on 7/6/2023 with a 4-week history of progressively worsening chest discomfort with exertion  · H/o inferior MI in December 2021 - s/p MERRITT to [facility] with residual mLAD disease; had angina and underwent cath on 1/3/2022 with MERRITT to mLAD; stable angina and underwent cath in June 2022 s/p MERRITT to LCX and POBA to OM1 with 20% residual, patent MERRITT-dRCA and MERRITT-mLAD  · He was seen at [address] [facility] from 6/27/2023 to 6/28/2023. EKG was nonischemic and troponins were negative x 3. Lexiscan stress test was reported as negative for ischemia. Echocardiogram was unremarkable. He was seen by cardiology and begun on Imdur 30 mg daily and advised to continue ASA 81 mg daily, Plavix 75 mg daily, atorvastatin 80 mg daily, Toprol-XL 50 mg daily  · He was advised to come to the ED after he saw his primary care physician on 7/6/2023 as he only had mild improvement in his symptoms  · Cardiac cath (7/7/23) - Ost LAD lesion is 90% stenosed. Prior mid LAD, prox LCX, OM, and distal RCA stents are patent. · Transferred to [facility] for cardiac surgery and cardiology evaluation   · Ct ASA 81 mg daily, Atorvastatin 80 mg daily, Toprol XL 50 mg daily  · Status post high risk PCI July 14. · Note cardiology input. Discharge planning. Will need to clarify patient's living arrangements. Patient reports that he should have a room available until tomorrow morning. We will plan on discharging in a.m.     Primary hypertension  Assessment & Plan  · Home medications: Toprol-XL 50 mg daily, losartan 50 mg daily  · Losartan held in the setting of increase in creatinine from baseline and possible CABG  · Monitor BP    Mild persistent asthma without complication  Assessment & Plan  · No exacerbation   · Continue Breo  · Albuterol inhaler as needed    HLD (hyperlipidemia)  Assessment & Plan  · Continue Atorvastatin 80 mg daily, Zetia 10 mg daily    Gastroesophageal reflux disease without esophagitis  Assessment & Plan  · Continue PPI twice daily             VTE Pharmacologic Prophylaxis:   Pharmacologic: Heparin  Mechanical VTE Prophylaxis in Place: No    Patient Centered Rounds: I have performed bedside rounds with nursing staff today. Time Spent for Care: 54. More than 50% of total time spent on counseling and coordination of care as described above. Current Length of Stay: 9 day(s)    Current Patient Status: Inpatient       Code Status: Level 1 - Full Code      Subjective:   nad    Objective:     Vitals:   Temp (24hrs), Av.4 °F (36.9 °C), Min:97.4 °F (36.3 °C), Max:99 °F (37.2 °C)    Temp:  [97.4 °F (36.3 °C)-99 °F (37.2 °C)] 98.3 °F (36.8 °C)  HR:  [54-60] 59  Resp:  [15-18] 15  BP: (126-153)/(55-76) 147/76  SpO2:  [95 %-97 %] 96 %  Body mass index is 25.24 kg/m². Input and Output Summary (last 24 hours): Intake/Output Summary (Last 24 hours) at 2023 1010  Last data filed at 2023 6836  Gross per 24 hour   Intake 828 ml   Output --   Net 828 ml       Physical Exam:     Physical Exam  Constitutional:       Appearance: Normal appearance. Eyes:      Pupils: Pupils are equal, round, and reactive to light. Cardiovascular:      Rate and Rhythm: Normal rate and regular rhythm. Pulmonary:      Effort: Pulmonary effort is normal.      Breath sounds: Normal breath sounds. Abdominal:      General: Abdomen is flat. There is no distension. Palpations: Abdomen is soft. There is no mass. Musculoskeletal:         General: No swelling. Neurological:      General: No focal deficit present. Mental Status: He is alert and oriented to person, place, and time.    Psychiatric:         Mood and Affect: Mood normal. Additional Data:     Labs:    Results from last 7 days   Lab Units 07/10/23  0519   WBC Thousand/uL 7.59   HEMOGLOBIN g/dL 10.6*   HEMATOCRIT % 32.4*   PLATELETS Thousands/uL 232     Results from last 7 days   Lab Units 07/16/23  0449   POTASSIUM mmol/L 3.8   CHLORIDE mmol/L 110*   CO2 mmol/L 27   BUN mg/dL 15   CREATININE mg/dL 1.26   CALCIUM mg/dL 9.0           * I Have Reviewed All Lab Data Listed Above. * Additional Pertinent Lab Tests Reviewed:  All Labs Within Last 24 Hours Reviewed        Recent Cultures (last 7 days):           Last 24 Hours Medication List:   Current Facility-Administered Medications   Medication Dose Route Frequency Provider Last Rate   • acetaminophen  975 mg Oral Q6H PRN Britt Hess MD     • albuterol  2 puff Inhalation Q4H PRN Britt Hess MD     • aluminum-magnesium hydroxide-simethicone  30 mL Oral Q6H PRN Britt Hess MD     • aspirin  81 mg Oral Daily Britt Hess MD     • atorvastatin  80 mg Oral HS Britt Hess MD     • benzonatate  100 mg Oral TID PRN Britt Hess MD     • bisacodyl  10 mg Rectal Daily Britt Hess MD     • carbamide peroxide  5 drop Both Ears BID Britt Hess MD     • clopidogrel  75 mg Oral Daily Britt Hess MD     • ezetimibe  10 mg Oral HS Britt Hess MD     • fluticasone  2 spray Nasal Daily PRN Britt Hess MD     • fluticasone-vilanterol  1 puff Inhalation Daily Britt Hess MD     • guaiFENesin  600 mg Oral Q12H Jamia Pa MD     • heparin (porcine)  5,000 Units Subcutaneous Q8H [address] Britt Hess MD     • hydrocortisone   Topical BID Britt Hess MD     • hydrOXYzine HCL  50 mg Oral TID PRN Britt Hess MD     • isosorbide mononitrate  30 mg Oral Daily Angela Whitlock MD     • lubiprostone  8 mcg Oral BID Britt Hess MD     • metoprolol succinate  50 mg Oral Daily Britt Hess MD     • nitroglycerin  0.4 mg Sublingual Q5 Min PRN Britt Hess MD     • ondansetron  4 mg Intravenous Q6H PRN Britt Hess MD     • oxyCODONE  5 mg Oral TID Britt Hess MD     • pantoprazole  40 mg Oral BID AC Britt Hess MD     • polyethylene glycol  17 g Oral Daily Britt Hess MD     • pramipexole  0.25 mg Oral After Addy Best MD     • simethicone  80 mg Oral 4x Daily PRN Britt Hess MD     • temazepam  15 mg Oral HS PRN Britt Hess MD          Today, Patient Was Seen By: Sd Espana DO    ** Please Note: Dictation voice to text software may have been used in the creation of this document.  **

## 2023-07-16 NOTE — PLAN OF CARE
Problem: PAIN - ADULT  Goal: Verbalizes/displays adequate comfort level or baseline comfort level  Description: Interventions:  - Encourage patient to monitor pain and request assistance  - Assess pain using appropriate pain scale  - Administer analgesics based on type and severity of pain and evaluate response  - Implement non-pharmacological measures as appropriate and evaluate response  - Consider cultural and social influences on pain and pain management  - Notify physician/advanced practitioner if interventions unsuccessful or patient reports new pain  Outcome: Progressing     Problem: INFECTION - ADULT  Goal: Absence or prevention of progression during hospitalization  Description: INTERVENTIONS:  - Assess and monitor for signs and symptoms of infection  - Monitor lab/diagnostic results  - Monitor all insertion sites, i.e. indwelling lines, tubes, and drains  - Monitor endotracheal if appropriate and nasal secretions for changes in amount and color  - [place] appropriate cooling/warming therapies per order  - Administer medications as ordered  - Instruct and encourage patient and family to use good hand hygiene technique  - Identify and instruct in appropriate isolation precautions for identified infection/condition  Outcome: Progressing  Goal: Absence of fever/infection during neutropenic period  Description: INTERVENTIONS:  - Monitor WBC    Outcome: Progressing     Problem: SAFETY ADULT  Goal: Patient will remain free of falls  Description: INTERVENTIONS:  - Educate patient/family on patient safety including physical limitations  - Instruct patient to call for assistance with activity   - Consult OT/PT to assist with strengthening/mobility   - Keep Call bell within reach  - Keep bed low and locked with side rails adjusted as appropriate  - Keep care items and personal belongings within reach  - Initiate and maintain comfort rounds  - Make Fall Risk Sign visible to staff  - Obtain necessary fall risk management equipment: call light within reach. TABS  - Apply yellow socks and bracelet for high fall risk patients  - Consider moving patient to room near nurses station  Outcome: Progressing  Goal: Maintain or return to baseline ADL function  Description: INTERVENTIONS:  -  Assess patient's ability to carry out ADLs; assess patient's baseline for ADL function and identify physical deficits which impact ability to perform ADLs (bathing, care of mouth/teeth, toileting, grooming, dressing, etc.)  - Assess/evaluate cause of self-care deficits   - Assess range of motion  - Assess patient's mobility; develop plan if impaired  - Assess patient's need for assistive devices and provide as appropriate  - Encourage maximum independence but intervene and supervise when necessary  - Involve family in performance of ADLs  - Assess for home care needs following discharge   - Consider OT consult to assist with ADL evaluation and planning for discharge  - Provide patient education as appropriate  Outcome: Progressing     Problem: Knowledge Deficit  Goal: Patient/family/caregiver demonstrates understanding of disease process, treatment plan, medications, and discharge instructions  Description: Complete learning assessment and assess knowledge base.   Interventions:  - Provide teaching at level of understanding  - Provide teaching via preferred learning methods  Outcome: Progressing

## 2023-07-16 NOTE — ASSESSMENT & PLAN NOTE
· Presented to [address] [place], [place] [address]  on 7/6/2023 with a 4-week history of progressively worsening chest discomfort with exertion  · H/o inferior MI in December 2021 - s/p MERRITT to dRCA with residual mLAD disease; had angina and underwent cath on 1/3/2022 with MERRITT to mLAD; stable angina and underwent cath in June 2022 s/p MERRITT to LCX and POBA to OM1 with 20% residual, patent MERRITT-dRCA and MERRITT-mLAD  · He was seen at [address] [facility] from 6/27/2023 to 6/28/2023. EKG was nonischemic and troponins were negative x 3. Lexiscan stress test was reported as negative for ischemia. Echocardiogram was unremarkable. He was seen by cardiology and begun on Imdur 30 mg daily and advised to continue ASA 81 mg daily, Plavix 75 mg daily, atorvastatin 80 mg daily, Toprol-XL 50 mg daily  · He was advised to come to the ED after he saw his primary care physician on 7/6/2023 as he only had mild improvement in his symptoms  · Cardiac cath (7/7/23) - Ost LAD lesion is 90% stenosed. Prior mid LAD, prox LCX, OM, and distal RCA stents are patent. · Transferred to [facility] for cardiac surgery and cardiology evaluation   · Ct ASA 81 mg daily, Atorvastatin 80 mg daily, Toprol XL 50 mg daily  · Status post high risk PCI July 14. · Note cardiology input. Discharge planning. Will need to clarify patient's living arrangements.

## 2023-07-16 NOTE — ASSESSMENT & PLAN NOTE
Lab Results   Component Value Date    EGFR 57 07/16/2023    EGFR 63 07/15/2023    EGFR 61 07/14/2023    CREATININE 1.26 07/16/2023    CREATININE 1.16 07/15/2023    CREATININE 1.20 07/14/2023   · Baseline creatinine 1.1 to 1.3  · Creatinine elevated at 1.62 on 7/6/2023 and improved now  · Losartan held  · Monitor creatinine  · Avoid nephrotoxic medications and hypotension

## 2023-07-17 VITALS
HEIGHT: 72 IN | TEMPERATURE: 97.9 F | HEART RATE: 56 BPM | SYSTOLIC BLOOD PRESSURE: 159 MMHG | DIASTOLIC BLOOD PRESSURE: 74 MMHG | RESPIRATION RATE: 16 BRPM | OXYGEN SATURATION: 97 % | WEIGHT: 185.85 LBS | BODY MASS INDEX: 25.17 KG/M2

## 2023-07-17 PROCEDURE — 99239 HOSP IP/OBS DSCHRG MGMT >30: CPT | Performed by: INTERNAL MEDICINE

## 2023-07-17 RX ORDER — CLOPIDOGREL BISULFATE 75 MG/1
75 TABLET ORAL DAILY
Qty: 30 TABLET | Refills: 0 | Status: SHIPPED | OUTPATIENT
Start: 2023-07-18

## 2023-07-17 RX ORDER — EZETIMIBE 10 MG/1
10 TABLET ORAL
Qty: 30 TABLET | Refills: 0 | Status: SHIPPED | OUTPATIENT
Start: 2023-07-17

## 2023-07-17 RX ADMIN — CARBAMIDE PEROXIDE 6.5% 5 DROP: 6.5 LIQUID AURICULAR (OTIC) at 08:41

## 2023-07-17 RX ADMIN — OXYCODONE HYDROCHLORIDE 5 MG: 5 TABLET ORAL at 13:09

## 2023-07-17 RX ADMIN — FLUTICASONE PROPIONATE 2 SPRAY: 50 SPRAY, METERED NASAL at 08:41

## 2023-07-17 RX ADMIN — LUBIPROSTONE 8 MCG: 8 CAPSULE, GELATIN COATED ORAL at 08:39

## 2023-07-17 RX ADMIN — PANTOPRAZOLE SODIUM 40 MG: 40 TABLET, DELAYED RELEASE ORAL at 06:00

## 2023-07-17 RX ADMIN — CLOPIDOGREL BISULFATE 75 MG: 75 TABLET ORAL at 08:39

## 2023-07-17 RX ADMIN — POLYETHYLENE GLYCOL 3350 17 G: 17 POWDER, FOR SOLUTION ORAL at 08:40

## 2023-07-17 RX ADMIN — METOPROLOL SUCCINATE 50 MG: 50 TABLET, EXTENDED RELEASE ORAL at 08:38

## 2023-07-17 RX ADMIN — OXYCODONE HYDROCHLORIDE 5 MG: 5 TABLET ORAL at 06:00

## 2023-07-17 RX ADMIN — GUAIFENESIN 600 MG: 600 TABLET, EXTENDED RELEASE ORAL at 08:39

## 2023-07-17 RX ADMIN — ISOSORBIDE MONONITRATE 30 MG: 30 TABLET, EXTENDED RELEASE ORAL at 08:39

## 2023-07-17 RX ADMIN — HYDROCORTISONE: 1 CREAM TOPICAL at 08:38

## 2023-07-17 RX ADMIN — FLUTICASONE FUROATE AND VILANTEROL TRIFENATATE 1 PUFF: 200; 25 POWDER RESPIRATORY (INHALATION) at 08:42

## 2023-07-17 RX ADMIN — ASPIRIN 81 MG CHEWABLE TABLET 81 MG: 81 TABLET CHEWABLE at 08:39

## 2023-07-17 NOTE — CASE MANAGEMENT
Case Management Discharge Planning Note    Patient name Srinivas Hazel  [facility] [id number]  : 1955 Date 2023       Current Admission Date: 2023  Current Admission Diagnosis:Coronary artery disease   Patient Active Problem List    Diagnosis Date Noted   • Primary hypertension 2023   • Irritable bowel syndrome with constipation 2023   • Stage 3a chronic kidney disease ([address]) 2023   • Angina pectoris ([address]) 2023   • Coronary artery disease    • Uncomplicated opioid dependence ([address]) 2023   • [place] (hard of hearing) 2023   • Chest pain 2021   • Gastroesophageal reflux disease without esophagitis 2021   • HLD (hyperlipidemia) 2021   • Restless legs 2021   • Mild persistent asthma without complication    • Abnormal CT scan of head 2021      LOS (days): 10  Geometric Mean LOS (GMLOS) (days): 2.00  Days to GMLOS:-7.6     OBJECTIVE:  Risk of Unplanned Readmission Score: 18.27         Current admission status: Inpatient   Preferred Pharmacy:   [address]  [address] [place] [address]  Phone: [phone] Fax: [address] [facility] [place] - [address] [address]  Phone: [phone] Fax: [phone]    Primary Care Provider: Inna Escamilla DO    Primary Insurance: MEDICARE  Secondary Insurance: [facility]    DISCHARGE DETAILS:    Discharge planning discussed with[de-identified] pt at bedside     Comments - Freedom of Choice: No discharge recommendations were discussed with pt at this time                     Requested [address]         Is the patient interested in [address] at discharge?: No    DME Referral Provided  Referral made for DME?: No    Other Referral/Resources/Interventions Provided:  Referral Comments: Pt will return to [address] [place] [address]         Treatment Team Recommendation: Shelter  Discharge Destination Plan[de-identified] Shelter  Transport at Discharge : Ride Share        Transported by [employer] and Unit #): Jose Enrique  ETA of Transport (Date): 07/17/23  ETA of Transport (Time): 1600              IMM Given (Date):: 07/17/23  IMM Given to[de-identified] Patient     Additional Comments: IMM signed. CM stated to pt that she will set up his transportation back to the [address] [place] [address] at 4:00 pm. Pt stated " Well I just spoke to them and they said they do have any beds for today". CM called [address] [place] [address] and they stated they do have male beds for today and pt needs to be there by 4:30pm. CM will continue to follow.

## 2023-07-17 NOTE — ASSESSMENT & PLAN NOTE
02/16/23 1424   Patient Assessment/Suction   Level of Consciousness (AVPU) alert   Respiratory Effort Unlabored   Expansion/Accessory Muscles/Retractions no use of accessory muscles   All Lung Fields Breath Sounds clear   Rhythm/Pattern, Respiratory unlabored   Cough Frequency no cough   PRE-TX-O2   Device (Oxygen Therapy) nasal cannula   $ Is the patient on Low Flow Oxygen? Yes   Flow (L/min) 2   SpO2 96 %   Pulse Oximetry Type Intermittent   $ Pulse Oximetry - Multiple Charge Pulse Oximetry - Multiple   Pulse (!) 116   Resp 18   Education   $ Education 15 min  (SATS)   Respiratory Evaluation   $ Care Plan Tech Time 15 min   $ Eval/Re-eval Charges Re-evaluation        · History of IBS C, on Linzess  · Usually has a bowel movement every 4 days, on the day he has a bowel movements he has severe cramping and he usually spends the day in bed.  Had BMs 7/8  · Will bring in [address] [place] [address] from home

## 2023-07-17 NOTE — ASSESSMENT & PLAN NOTE
· Presented to [facility] on 7/6/2023 with a 4-week history of progressively worsening chest discomfort with exertion  · H/o inferior MI in December 2021 - s/p MERRITT to dRCA with residual mLAD disease; had angina and underwent cath on 1/3/2022 with MERRITT to mLAD; stable angina and underwent cath in June 2022 s/p MERRITT to LCX and POBA to OM1 with 20% residual, patent MERRITT-dRCA and MERRITT-mLAD  · He was seen at [address] [facility] from 6/27/2023 to 6/28/2023. EKG was nonischemic and troponins were negative x 3. Lexiscan stress test was reported as negative for ischemia. Echocardiogram was unremarkable. He was seen by cardiology and begun on Imdur 30 mg daily and advised to continue ASA 81 mg daily, Plavix 75 mg daily, atorvastatin 80 mg daily, Toprol-XL 50 mg daily  · He was advised to come to the ED after he saw his primary care physician on 7/6/2023 as he only had mild improvement in his symptoms  · Cardiac cath (7/7/23) - Ost LAD lesion is 90% stenosed. Prior mid LAD, prox LCX, OM, and distal RCA stents are patent. · [facility] and cardiology evaluation   · Ct ASA 81 mg daily, Atorvastatin 80 mg daily, Toprol XL 50 mg daily  · Status post high risk PCI July 14. · Note cardiology input. Discharge planning. Will need to clarify patient's living arrangements.

## 2023-07-17 NOTE — ASSESSMENT & PLAN NOTE
· Home medications: Toprol-XL 50 mg daily, losartan 50 mg daily  · Continue with aspirin Plavix Lipitor Zetia therapy.   · Could consider  · Monitor BP

## 2023-07-17 NOTE — DISCHARGE SUMMARY
[facility]  Discharge- Dian Wells 1955, 69 y.o. male MRN: [id number]  Unit/Bed#: [facility] 419-01 Encounter: [id number]  Primary Care Provider: Dejuan Tamez DO   Date and time admitted to hospital: 7/7/2023 10:16 PM    * Coronary artery disease  Assessment & Plan  · Presented to [facility] on 7/6/2023 with a 4-week history of progressively worsening chest discomfort with exertion  · H/o inferior MI in December 2021 - s/p MERRITT to [facility] with residual mLAD disease; had angina and underwent cath on 1/3/2022 with MERRITT to mLAD; stable angina and underwent cath in June 2022 s/p MERRITT to LCX and POBA to OM1 with 20% residual, patent MERRITT-dRCA and MERRITT-mLAD  · He was seen at [facility] from 6/27/2023 to 6/28/2023. EKG was nonischemic and troponins were negative x 3. Lexiscan stress test was reported as negative for ischemia. Echocardiogram was unremarkable. He was seen by cardiology and begun on Imdur 30 mg daily and advised to continue ASA 81 mg daily, Plavix 75 mg daily, atorvastatin 80 mg daily, Toprol-XL 50 mg daily  · He was advised to come to the ED after he saw his primary care physician on 7/6/2023 as he only had mild improvement in his symptoms  · Cardiac cath (7/7/23) - Ost LAD lesion is 90% stenosed. Prior mid LAD, prox LCX, OM, and distal RCA stents are patent. · Transferred to [facility] for cardiac surgery and cardiology evaluation   · Ct ASA 81 mg daily, Atorvastatin 80 mg daily, Toprol XL 50 mg daily  · Status post high risk PCI July 14. · Note cardiology input. Discharge planning. Will need to clarify patient's living arrangements. Irritable bowel syndrome with constipation  Assessment & Plan  · History of IBS C, on Linzess  · Usually has a bowel movement every 4 days, on the day he has a bowel movements he has severe cramping and he usually spends the day in bed.  Had BMs 7/8  · Will bring in [address] [place] [address] from home    Primary hypertension  Assessment & Plan  · Home medications: Toprol-XL 50 mg daily, losartan 50 mg daily  · Continue with aspirin Plavix Lipitor Zetia therapy. · Could consider  · Monitor BP    [place] (hard of hearing)  Assessment & Plan  · Patient with longstanding history of hearing impairment. HLD (hyperlipidemia)  Assessment & Plan  · Continue Atorvastatin 80 mg daily, Zetia 10 mg daily    Gastroesophageal reflux disease without esophagitis  Assessment & Plan  · Continue PPI twice daily              Medical Problems     Resolved Problems  Date Reviewed: 7/15/2023          Resolved    Fever 7/13/2023     Resolved by  Cait Mensah MD          Admission Date:   Admission Orders (From admission, onward)     Ordered        07/07/23 2237  Inpatient Admission  Once                        Admitting Diagnosis: Angina pectoris ([address])    HPI: This is a  very pleasant 47-year-old male with past medical history coronary disease with prior stent placement, hypertension hyperlipidemia gastroesophageal reflux disease restless leg syndrome opiate dependence asthma history of chronic kidney disease who presents to the hospital as a transfer from [address] [place] [address] for assessment for cardiothoracic surgery. Patient was evaluated by cardiology for CABG versus high risk PCI. He was sent initially to the emergency room on July 6, 2023 by his primary care physician after experiencing 4 weeks of progressively worsening chest discomfort and shortness of breath with associated exertion. Patient initially reported that his symptoms improved mildly after beginning Imdur therapy 30 mg daily by his cardiologist at [facility] on June 28, 2023 after a stress test and echo were noted to be unremarkable. In follow-up a cardiac catheterization was done which showed an ostial LAD lesion which was 90% stenosed. He was evaluated by cardiology at [facility] and subsequently recommended transfer here for further cardiothoracic surgery assessment.   Procedures Performed:   Orders Placed This Encounter   Procedures   • Cardiac catheterization       Summary of Hospital Course: By way further review with cardiology and cardiothoracic surgery and ultimately was started to have a high risk PCI performed. A cardiac catheterization was performed on July 14, 2023 which showed the following: "Summary: Successful IVUS-guided PCI, ostial LAD, with placement of a 3.5x23mm, post-dilation to 5.0mm in the distal left main trunk, and kissing balloon PCI of the ostial LAD and ostial circumflex. Plan: DAPT, statin, aggressive medical therapy and lifestyle modification."  As mentioned above we will plan on proceeding with dual antiplatelet therapy in addition to statin therapy and beta-blockade at discharge. Patient to follow-up with cardiology regarding addition of other antihypertensives including readdition of losartan. *    Condition at Discharge: good         Discharge instructions/Information to patient and family:   See after visit summary for information provided to patient and family. Provisions for Follow-Up Care:  See after visit summary for information related to follow-up care and any pertinent home health orders. PCP: Patricia Delatorre DO    Disposition: Home    Planned Readmission: No    Discharge Statement   I spent 85 minutes discharging the patient. This time was spent on the day of discharge. I had direct contact with the patient on the day of discharge. Additional documentation is required if more than 30 minutes were spent on discharge. Discharge Medications:  See after visit summary for reconciled discharge medications provided to patient and family.

## (undated) DEVICE — TREK CORONARY DILATATION CATHETER 3.0 MM X 15 MM / RAPID-EXCHANGE: Brand: TREK

## (undated) DEVICE — THE SUPERCROSS MICROCATHETER IS INTENDED TO BE USED IN CONJUNCTION WITH STEERABLE GUIDEWIRES TO ACCESS DISCRETE REGIONS OF THE CORONARY AND/OR PERIPHERAL VASCULATURE. IT MAY BE USED TO FACILITATE PLACEMENT AND EXCHANGE OF GUIDEWIRES AND OTHER INTERVENTIONAL DEVICES AND TO SUBSELECTIVELY INFUSE/DELIVER DIAGNOSTIC AND THERAPEUTIC AGENTS.: Brand: SUPERCROSS™ AT MICROCATHETER

## (undated) DEVICE — THE VASC BAND HEMOSTAT IS A COMPRESSION DEVICE TO ASSIST HEMOSTASIS OF ARTERIAL, VENOUS AND HEMODIALYSIS PERCUTANEOUS ACCESS SITES.: Brand: VASC BAND™ HEMOSTAT

## (undated) DEVICE — TREK CORONARY DILATATION CATHETER 3.50 MM X 15 MM / RAPID-EXCHANGE: Brand: TREK

## (undated) DEVICE — TREK CORONARY DILATATION CATHETER 2.50 MM X 15 MM / RAPID-EXCHANGE: Brand: TREK

## (undated) DEVICE — RUNTHROUGH NS EXTRA FLOPPY PTCA GUIDEWIRE: Brand: RUNTHROUGH

## (undated) DEVICE — GLIDESHEATH BASIC HYDROPHILIC COATED INTRODUCER SHEATH: Brand: GLIDESHEATH

## (undated) DEVICE — GUIDEWIRE FORTE FLOPPY 185CM 2CM J

## (undated) DEVICE — CORONARY IMAGING CATHETER: Brand: OPTICROSS™ 6 HD

## (undated) DEVICE — RADIFOCUS OPTITORQUE ANGIOGRAPHIC CATHETER: Brand: OPTITORQUE

## (undated) DEVICE — BALLOON TRAPPER .014 IN 6-8FR 10MM WIRE

## (undated) DEVICE — Device: Brand: ASAHI SILVERWAY

## (undated) DEVICE — TR BAND RADIAL ARTERY COMPRESSION DEVICE: Brand: TR BAND

## (undated) DEVICE — HI-TORQUE WHISPER MS GUIDE WIRE .014 STRAIGHT TIP 3.0 CM X 300 CM: Brand: HI-TORQUE WHISPER

## (undated) DEVICE — GLIDESHEATH SLENDER STAINLESS STEEL KIT: Brand: GLIDESHEATH SLENDER

## (undated) DEVICE — NC TREK NEO™ CORONARY DILATATION CATHETER 3.50 MM X 20 MM / RAPID-EXCHANGE: Brand: NC TREK NEO™

## (undated) DEVICE — CATH GUIDE LAUNCHER 7FR EBU 3.5

## (undated) DEVICE — NC TREK NEO™ CORONARY DILATATION CATHETER 3.00 MM X 20 MM / RAPID-EXCHANGE: Brand: NC TREK NEO™

## (undated) DEVICE — THE TURNPIKE CATHETERS ARE INTENDED TO BE USED TO ACCESS DISCRETE REGIONS OF THE CORONARY AND/OR PERIPHERAL VASCULATURE. THEY MAY BE USED TO FACILITATE PLACEMENT AND EXCHANGE OF GUIDEWIRES AND TO SUBSELECTIVELY INFUSE/DELIVER DIAGNOSTIC AND THERAPEUTIC AGENTS.: Brand: TURNPIKE® CATHETER

## (undated) DEVICE — NC TREK NEO™ CORONARY DILATATION CATHETER 5.00 MM X 8 MM / RAPID-EXCHANGE: Brand: NC TREK NEO™

## (undated) DEVICE — GUIDELINER CATHETERS ARE INTENDED TO BE USED IN CONJUNCTION WITH GUIDE CATHETERS TO ACCESS DISCRETE REGIONS OF THE CORONARY AND/OR PERIPHERAL VASCULATURE, AND TO FACILITATE PLACEMENT OF INTERVENTIONAL DEVICES.: Brand: GUIDELINER® V3 CATHETER

## (undated) DEVICE — GUIDEWIRE WHOLEY .035 145 CM FLOP STR TIP

## (undated) DEVICE — DGW .035 FC J3MM 260CM TEF: Brand: EMERALD

## (undated) DEVICE — MINI TREK CORONARY DILATATION CATHETER 2.0 MM X 15 MM / RAPID-EXCHANGE: Brand: MINI TREK